# Patient Record
Sex: MALE | Race: WHITE | NOT HISPANIC OR LATINO
[De-identification: names, ages, dates, MRNs, and addresses within clinical notes are randomized per-mention and may not be internally consistent; named-entity substitution may affect disease eponyms.]

---

## 2019-02-14 ENCOUNTER — TRANSCRIPTION ENCOUNTER (OUTPATIENT)
Age: 49
End: 2019-02-14

## 2019-02-14 ENCOUNTER — INPATIENT (INPATIENT)
Facility: HOSPITAL | Age: 49
LOS: 11 days | DRG: 64 | End: 2019-02-26
Attending: INTERNAL MEDICINE | Admitting: PSYCHIATRY & NEUROLOGY
Payer: COMMERCIAL

## 2019-02-14 ENCOUNTER — INPATIENT (INPATIENT)
Facility: HOSPITAL | Age: 49
LOS: 0 days | Discharge: HOME | DRG: 64 | End: 2019-02-14
Attending: INTERNAL MEDICINE | Admitting: INTERNAL MEDICINE
Payer: COMMERCIAL

## 2019-02-14 VITALS
OXYGEN SATURATION: 95 % | SYSTOLIC BLOOD PRESSURE: 126 MMHG | HEART RATE: 104 BPM | DIASTOLIC BLOOD PRESSURE: 83 MMHG | RESPIRATION RATE: 19 BRPM

## 2019-02-14 VITALS
TEMPERATURE: 98 F | OXYGEN SATURATION: 96 % | RESPIRATION RATE: 60 BRPM | DIASTOLIC BLOOD PRESSURE: 94 MMHG | HEART RATE: 106 BPM | SYSTOLIC BLOOD PRESSURE: 162 MMHG

## 2019-02-14 VITALS
DIASTOLIC BLOOD PRESSURE: 90 MMHG | HEART RATE: 79 BPM | OXYGEN SATURATION: 97 % | SYSTOLIC BLOOD PRESSURE: 139 MMHG | RESPIRATION RATE: 20 BRPM | HEIGHT: 69 IN | TEMPERATURE: 97 F | WEIGHT: 199.96 LBS

## 2019-02-14 DIAGNOSIS — F10.20 ALCOHOL DEPENDENCE, UNCOMPLICATED: ICD-10-CM

## 2019-02-14 DIAGNOSIS — I61.9 NONTRAUMATIC INTRACEREBRAL HEMORRHAGE, UNSPECIFIED: ICD-10-CM

## 2019-02-14 DIAGNOSIS — S02.609A FRACTURE OF MANDIBLE, UNSPECIFIED, INITIAL ENCOUNTER FOR CLOSED FRACTURE: Chronic | ICD-10-CM

## 2019-02-14 DIAGNOSIS — K11.6 MUCOCELE OF SALIVARY GLAND: ICD-10-CM

## 2019-02-14 DIAGNOSIS — I10 ESSENTIAL (PRIMARY) HYPERTENSION: ICD-10-CM

## 2019-02-14 LAB
ALBUMIN SERPL ELPH-MCNC: 4.7 G/DL — SIGNIFICANT CHANGE UP (ref 3.3–5.2)
ALP SERPL-CCNC: 68 U/L — SIGNIFICANT CHANGE UP (ref 40–120)
ALT FLD-CCNC: 39 U/L — SIGNIFICANT CHANGE UP
ANION GAP SERPL CALC-SCNC: 12 MMOL/L — SIGNIFICANT CHANGE UP (ref 5–17)
ANION GAP SERPL CALC-SCNC: 18 MMOL/L — HIGH (ref 5–17)
APTT BLD: 26.4 SEC — LOW (ref 27.5–36.3)
AST SERPL-CCNC: 30 U/L — SIGNIFICANT CHANGE UP
BASOPHILS # BLD AUTO: 0 K/UL — SIGNIFICANT CHANGE UP (ref 0–0.2)
BASOPHILS NFR BLD AUTO: 0.2 % — SIGNIFICANT CHANGE UP (ref 0–2)
BILIRUB SERPL-MCNC: 0.4 MG/DL — SIGNIFICANT CHANGE UP (ref 0.4–2)
BLD GP AB SCN SERPL QL: NEGATIVE — SIGNIFICANT CHANGE UP
BUN SERPL-MCNC: 8 MG/DL — SIGNIFICANT CHANGE UP (ref 7–23)
BUN SERPL-MCNC: 8 MG/DL — SIGNIFICANT CHANGE UP (ref 8–20)
CALCIUM SERPL-MCNC: 9.1 MG/DL — SIGNIFICANT CHANGE UP (ref 8.4–10.5)
CALCIUM SERPL-MCNC: 9.2 MG/DL — SIGNIFICANT CHANGE UP (ref 8.6–10.2)
CHLORIDE SERPL-SCNC: 99 MMOL/L — SIGNIFICANT CHANGE UP (ref 96–108)
CHLORIDE SERPL-SCNC: 99 MMOL/L — SIGNIFICANT CHANGE UP (ref 98–107)
CO2 SERPL-SCNC: 22 MMOL/L — SIGNIFICANT CHANGE UP (ref 22–31)
CO2 SERPL-SCNC: 27 MMOL/L — SIGNIFICANT CHANGE UP (ref 22–29)
CREAT SERPL-MCNC: 0.6 MG/DL — SIGNIFICANT CHANGE UP (ref 0.5–1.3)
CREAT SERPL-MCNC: 0.71 MG/DL — SIGNIFICANT CHANGE UP (ref 0.5–1.3)
EOSINOPHIL # BLD AUTO: 0.2 K/UL — SIGNIFICANT CHANGE UP (ref 0–0.5)
EOSINOPHIL NFR BLD AUTO: 5.3 % — HIGH (ref 0–5)
GLUCOSE SERPL-MCNC: 147 MG/DL — HIGH (ref 70–99)
GLUCOSE SERPL-MCNC: 184 MG/DL — HIGH (ref 70–115)
HBA1C BLD-MCNC: 6.5 % — HIGH (ref 4–5.6)
HCT VFR BLD CALC: 42.7 % — SIGNIFICANT CHANGE UP (ref 42–52)
HGB BLD-MCNC: 14.8 G/DL — SIGNIFICANT CHANGE UP (ref 14–18)
INR BLD: 0.92 RATIO — SIGNIFICANT CHANGE UP (ref 0.88–1.16)
LYMPHOCYTES # BLD AUTO: 1.4 K/UL — SIGNIFICANT CHANGE UP (ref 1–4.8)
LYMPHOCYTES # BLD AUTO: 34.4 % — SIGNIFICANT CHANGE UP (ref 20–55)
MAGNESIUM SERPL-MCNC: 1.8 MG/DL — SIGNIFICANT CHANGE UP (ref 1.6–2.6)
MCHC RBC-ENTMCNC: 31.5 PG — HIGH (ref 27–31)
MCHC RBC-ENTMCNC: 34.7 G/DL — SIGNIFICANT CHANGE UP (ref 32–36)
MCV RBC AUTO: 90.9 FL — SIGNIFICANT CHANGE UP (ref 80–94)
MONOCYTES # BLD AUTO: 0.4 K/UL — SIGNIFICANT CHANGE UP (ref 0–0.8)
MONOCYTES NFR BLD AUTO: 8.7 % — SIGNIFICANT CHANGE UP (ref 3–10)
NEUTROPHILS # BLD AUTO: 2.1 K/UL — SIGNIFICANT CHANGE UP (ref 1.8–8)
NEUTROPHILS NFR BLD AUTO: 51.2 % — SIGNIFICANT CHANGE UP (ref 37–73)
PHOSPHATE SERPL-MCNC: 3.4 MG/DL — SIGNIFICANT CHANGE UP (ref 2.5–4.5)
PLATELET # BLD AUTO: 150 K/UL — SIGNIFICANT CHANGE UP (ref 150–400)
POTASSIUM SERPL-MCNC: 3.7 MMOL/L — SIGNIFICANT CHANGE UP (ref 3.5–5.3)
POTASSIUM SERPL-MCNC: 3.9 MMOL/L — SIGNIFICANT CHANGE UP (ref 3.5–5.3)
POTASSIUM SERPL-SCNC: 3.7 MMOL/L — SIGNIFICANT CHANGE UP (ref 3.5–5.3)
POTASSIUM SERPL-SCNC: 3.9 MMOL/L — SIGNIFICANT CHANGE UP (ref 3.5–5.3)
PROT SERPL-MCNC: 7.9 G/DL — SIGNIFICANT CHANGE UP (ref 6.6–8.7)
PROTHROM AB SERPL-ACNC: 10.6 SEC — SIGNIFICANT CHANGE UP (ref 10–12.9)
RBC # BLD: 4.7 M/UL — SIGNIFICANT CHANGE UP (ref 4.6–6.2)
RBC # FLD: 12.6 % — SIGNIFICANT CHANGE UP (ref 11–15.6)
RH IG SCN BLD-IMP: POSITIVE — SIGNIFICANT CHANGE UP
SODIUM SERPL-SCNC: 138 MMOL/L — SIGNIFICANT CHANGE UP (ref 135–145)
SODIUM SERPL-SCNC: 139 MMOL/L — SIGNIFICANT CHANGE UP (ref 135–145)
TROPONIN T SERPL-MCNC: <0.01 NG/ML — SIGNIFICANT CHANGE UP (ref 0–0.06)
WBC # BLD: 4.2 K/UL — LOW (ref 4.8–10.8)
WBC # FLD AUTO: 4.2 K/UL — LOW (ref 4.8–10.8)

## 2019-02-14 PROCEDURE — 70496 CT ANGIOGRAPHY HEAD: CPT

## 2019-02-14 PROCEDURE — 99291 CRITICAL CARE FIRST HOUR: CPT | Mod: 25

## 2019-02-14 PROCEDURE — 71045 X-RAY EXAM CHEST 1 VIEW: CPT | Mod: 26,77

## 2019-02-14 PROCEDURE — ZZZZZ: CPT

## 2019-02-14 PROCEDURE — 84484 ASSAY OF TROPONIN QUANT: CPT

## 2019-02-14 PROCEDURE — 93005 ELECTROCARDIOGRAM TRACING: CPT

## 2019-02-14 PROCEDURE — 70487 CT MAXILLOFACIAL W/DYE: CPT

## 2019-02-14 PROCEDURE — 36620 INSERTION CATHETER ARTERY: CPT

## 2019-02-14 PROCEDURE — 71045 X-RAY EXAM CHEST 1 VIEW: CPT | Mod: 26

## 2019-02-14 PROCEDURE — 80053 COMPREHEN METABOLIC PANEL: CPT

## 2019-02-14 PROCEDURE — 36415 COLL VENOUS BLD VENIPUNCTURE: CPT

## 2019-02-14 PROCEDURE — 99291 CRITICAL CARE FIRST HOUR: CPT

## 2019-02-14 PROCEDURE — 71045 X-RAY EXAM CHEST 1 VIEW: CPT

## 2019-02-14 PROCEDURE — 85027 COMPLETE CBC AUTOMATED: CPT

## 2019-02-14 PROCEDURE — 70496 CT ANGIOGRAPHY HEAD: CPT | Mod: 26

## 2019-02-14 PROCEDURE — 70450 CT HEAD/BRAIN W/O DYE: CPT | Mod: 26,59,77

## 2019-02-14 PROCEDURE — 70450 CT HEAD/BRAIN W/O DYE: CPT | Mod: 26

## 2019-02-14 PROCEDURE — 93010 ELECTROCARDIOGRAM REPORT: CPT

## 2019-02-14 PROCEDURE — 82962 GLUCOSE BLOOD TEST: CPT

## 2019-02-14 PROCEDURE — 70450 CT HEAD/BRAIN W/O DYE: CPT

## 2019-02-14 PROCEDURE — 85610 PROTHROMBIN TIME: CPT

## 2019-02-14 PROCEDURE — 70487 CT MAXILLOFACIAL W/DYE: CPT | Mod: 26

## 2019-02-14 PROCEDURE — 96374 THER/PROPH/DIAG INJ IV PUSH: CPT

## 2019-02-14 PROCEDURE — 85730 THROMBOPLASTIN TIME PARTIAL: CPT

## 2019-02-14 RX ORDER — LABETALOL HCL 100 MG
10 TABLET ORAL ONCE
Qty: 0 | Refills: 0 | Status: COMPLETED | OUTPATIENT
Start: 2019-02-14 | End: 2019-02-14

## 2019-02-14 RX ORDER — HYDRALAZINE HCL 50 MG
10 TABLET ORAL ONCE
Qty: 0 | Refills: 0 | Status: COMPLETED | OUTPATIENT
Start: 2019-02-14 | End: 2019-02-14

## 2019-02-14 RX ORDER — NICARDIPINE HYDROCHLORIDE 30 MG/1
5 CAPSULE, EXTENDED RELEASE ORAL
Qty: 40 | Refills: 0 | Status: DISCONTINUED | OUTPATIENT
Start: 2019-02-14 | End: 2019-02-14

## 2019-02-14 RX ORDER — POTASSIUM CHLORIDE 20 MEQ
40 PACKET (EA) ORAL ONCE
Qty: 0 | Refills: 0 | Status: COMPLETED | OUTPATIENT
Start: 2019-02-14 | End: 2019-02-15

## 2019-02-14 RX ORDER — POTASSIUM CHLORIDE 20 MEQ
40 PACKET (EA) ORAL ONCE
Qty: 0 | Refills: 0 | Status: DISCONTINUED | OUTPATIENT
Start: 2019-02-14 | End: 2019-02-14

## 2019-02-14 RX ORDER — SODIUM CHLORIDE 5 G/100ML
500 INJECTION, SOLUTION INTRAVENOUS
Qty: 0 | Refills: 0 | Status: DISCONTINUED | OUTPATIENT
Start: 2019-02-14 | End: 2019-02-14

## 2019-02-14 RX ORDER — SODIUM CHLORIDE 5 G/100ML
1000 INJECTION, SOLUTION INTRAVENOUS
Qty: 0 | Refills: 0 | Status: DISCONTINUED | OUTPATIENT
Start: 2019-02-14 | End: 2019-02-15

## 2019-02-14 RX ORDER — MAGNESIUM SULFATE 500 MG/ML
2 VIAL (ML) INJECTION ONCE
Qty: 0 | Refills: 0 | Status: COMPLETED | OUTPATIENT
Start: 2019-02-14 | End: 2019-02-15

## 2019-02-14 RX ORDER — LEVETIRACETAM 250 MG/1
1500 TABLET, FILM COATED ORAL EVERY 12 HOURS
Qty: 0 | Refills: 0 | Status: DISCONTINUED | OUTPATIENT
Start: 2019-02-14 | End: 2019-02-14

## 2019-02-14 RX ORDER — FOLIC ACID 0.8 MG
1 TABLET ORAL DAILY
Qty: 0 | Refills: 0 | Status: DISCONTINUED | OUTPATIENT
Start: 2019-02-14 | End: 2019-02-15

## 2019-02-14 RX ORDER — NICARDIPINE HYDROCHLORIDE 30 MG/1
5 CAPSULE, EXTENDED RELEASE ORAL
Qty: 40 | Refills: 0 | Status: DISCONTINUED | OUTPATIENT
Start: 2019-02-14 | End: 2019-02-16

## 2019-02-14 RX ORDER — THIAMINE MONONITRATE (VIT B1) 100 MG
100 TABLET ORAL DAILY
Qty: 0 | Refills: 0 | Status: DISCONTINUED | OUTPATIENT
Start: 2019-02-14 | End: 2019-02-15

## 2019-02-14 RX ORDER — INFLUENZA VIRUS VACCINE 15; 15; 15; 15 UG/.5ML; UG/.5ML; UG/.5ML; UG/.5ML
0.5 SUSPENSION INTRAMUSCULAR ONCE
Qty: 0 | Refills: 0 | Status: DISCONTINUED | OUTPATIENT
Start: 2019-02-14 | End: 2019-02-14

## 2019-02-14 RX ORDER — LABETALOL HCL 100 MG
10 TABLET ORAL
Qty: 0 | Refills: 0 | Status: DISCONTINUED | OUTPATIENT
Start: 2019-02-14 | End: 2019-02-14

## 2019-02-14 RX ORDER — METOPROLOL TARTRATE 50 MG
50 TABLET ORAL ONCE
Qty: 0 | Refills: 0 | Status: COMPLETED | OUTPATIENT
Start: 2019-02-14 | End: 2019-02-14

## 2019-02-14 RX ADMIN — NICARDIPINE HYDROCHLORIDE 25 MG/HR: 30 CAPSULE, EXTENDED RELEASE ORAL at 12:32

## 2019-02-14 RX ADMIN — Medication 10 MILLIGRAM(S): at 10:53

## 2019-02-14 RX ADMIN — SODIUM CHLORIDE 75 MILLILITER(S): 5 INJECTION, SOLUTION INTRAVENOUS at 18:56

## 2019-02-14 RX ADMIN — Medication 10 MILLIGRAM(S): at 10:23

## 2019-02-14 RX ADMIN — Medication 10 MILLIGRAM(S): at 12:43

## 2019-02-14 RX ADMIN — NICARDIPINE HYDROCHLORIDE 25 MG/HR: 30 CAPSULE, EXTENDED RELEASE ORAL at 12:44

## 2019-02-14 RX ADMIN — LEVETIRACETAM 400 MILLIGRAM(S): 250 TABLET, FILM COATED ORAL at 15:16

## 2019-02-14 RX ADMIN — Medication 1 MILLIGRAM(S): at 11:23

## 2019-02-14 RX ADMIN — SODIUM CHLORIDE 30 MILLILITER(S): 5 INJECTION, SOLUTION INTRAVENOUS at 15:22

## 2019-02-14 RX ADMIN — Medication 50 MILLIGRAM(S): at 11:38

## 2019-02-14 NOTE — ED PROVIDER NOTE - OBJECTIVE STATEMENT
Mr. Way is a 48 year old male with no significant past medical history and who hasn't seen a physician in many years, who presents with acute onset of weakness in bilateral arms and legs as well as change in speech. This morning, he woke up around 7am and felt fine. He dropped his son off at school and went to work. Between 9am-9:30am he experienced sudden weakness in both arms and legs and was unable to walk. He denies any trauma or fall. He sat down on the ground and his friend helped him get to the hospital. Currently, he no longer feels weak, but does state that his speech sounds slurred to him. He admits to being flustered by the number of people in the room. This has never happened to him before.    seen by intake doc - called on vocera advised stroke activation-  spoke with stroke EICU - not candidate for TPA given acute hemorrhage - NEURO sx cristina saw in the ED - ok for MICU admission/BP control q 1 hour neuro checks

## 2019-02-14 NOTE — DISCHARGE NOTE ADULT - CARE PROVIDER_API CALL
Bennett Kendrick)  Neurosurgery  [facility], [address] [place] [address]  Phone: [phone]  Fax: [phone]  Follow Up Time:

## 2019-02-14 NOTE — ED ADULT NURSE REASSESSMENT NOTE - NS ED NURSE REASSESS COMMENT FT1
Code stroke called at 9:53, pt at Cat scan at this time, Dr. Manjarrez and Asim team at bedside awaiting pt for assessment.

## 2019-02-14 NOTE — H&P ADULT - MOTOR
minor pronator drift on L arm minor pronator drift on Right  arm  Strength 4/5 on Right Arm 5/5 on Right Leg

## 2019-02-14 NOTE — H&P ADULT - ATTENDING COMMENTS
Pt admitted by [facility] PA, I have seen and evaluated this patient independently and agree with the above findings: 48 year old male with h/o parotid cyst presents to ER with right sided weakness found to be hypertensive with L Basal ganglia bleed. Neurosurgery consulted.

## 2019-02-14 NOTE — ED ADULT NURSE NOTE - PMH
ICH (intracerebral hemorrhage) Cyst of parotid gland  Left x 15 years  ICH (intracerebral hemorrhage)    Jaw fracture

## 2019-02-14 NOTE — H&P ADULT - NSHPPHYSICALEXAM_GEN_ALL_CORE
Awake, eyes open spontaneously, global aphasia, mumbles some words, not following commands, moves left side with 5/5 strength spontaneously, RUE 0/5, RLE 3/5, pupils 3mm and brisk bilaterally, extraocular movements intact

## 2019-02-14 NOTE — PROGRESS NOTE ADULT - SUBJECTIVE AND OBJECTIVE BOX
HPI:  Mr. Way is a 47 y/o male with no known history of HTN (not followed closely by PCP) who presents after an acute onset of BLE weakness this morning at work and speech changes. The patient is dysarthric with some expressive aphasia on exam but reliably denies waking up with any focalities and instead had acute onset while attempting to get in the car while at work this morning. Denies any known history of BP or DVT/PE or recent headaches or vision changes. Denies any recent falls. Is not on any antiplatelet or anticoagulation therapy.  HCT showed acute left 2.1 x 1.8 x 2.8 cm cerebral hematoma centered in the left lentiform nucleus, with trace surrounding vasogenic edema. Mild associated mass effect. No midline shift. Patient was borderline hypertensive with 140s-150s mmHg systolic on presentation.    On admission, the patient was:  GCS: 15.  ICH score: 1  NIHSS: 5    VITALS:  T(C): , Max: 36.9 (02-14-19 @ 12:30)  HR:  (77 - 106)  BP:  (126/83 - 178/109)  ABP: --  RR:  (15 - 60)  SpO2:  (93% - 100%)  Wt(kg): --      LABS:  Na: 138 (02-14 @ 10:09)  K: 3.9 (02-14 @ 10:09)  Cl: 99 (02-14 @ 10:09)  CO2: 27.0 (02-14 @ 10:09)  BUN: 8.0 (02-14 @ 10:09)  Cr: 0.71 (02-14 @ 10:09)  Glu: 184(02-14 @ 10:09)    Hgb: 14.8 (02-14 @ 10:09)  Hct: 42.7 (02-14 @ 10:09)  WBC: 4.2 (02-14 @ 10:09)  Plt: 150 (02-14 @ 10:09)  PT: 10.6 (02-14 @ 10:09)  INR: 0.92 (02-14 @ 10:09)  aPTT: 26.4 (02-14 @ 10:09)    IMAGING:   Recent imaging studies were reviewed.    MEDICATIONS:    EXAMINATION:  General:  calm  HEENT:  MMM  Neuro:  awake, alert, oriented x 3, follows commands, moves all extremities  Cards:  RRR  Respiratory:  no respiratory distress  [place]domen:  soft  Extremities:  no edema  Skin:  warm/dry

## 2019-02-14 NOTE — ED STATDOCS - PROGRESS NOTE DETAILS
49 y/o M pt with no pert. hx presents to ED c/o weakness, and heaviness for approx. 1 hour. Pt notes slight dizziness and slight slurring speech. Pt reports he had difficulty ambulating on his own, however now in ED pt is ambulating normally. Denies nausea, and vomiting. No further complaints at this time.

## 2019-02-14 NOTE — PROGRESS NOTE ADULT - PROBLEM SELECTOR PLAN 1
cause unknown cause unknown  pt with worsening symptoms and increasing blood on CT   plan to now transfer to [facility] neuroICU urgently for higher level of care, neuroimaging and possible neuro intervention

## 2019-02-14 NOTE — H&P ADULT - RS GEN PE MLT RESP DETAILS PC
breath sounds equal/no wheezes/respirations non-labored/clear to auscultation bilaterally/good air movement/no rales/no rhonchi

## 2019-02-14 NOTE — ED ADULT NURSE NOTE - CHIEF COMPLAINT QUOTE
Patient arrived to ED today with c/o weakness, near syncope, weakness to his legs.  Patient reports he did not eat yet today.  Patient denies medical history.  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  Patient states he is feeling better at this time and is feeling less weak.  .

## 2019-02-14 NOTE — PROGRESS NOTE ADULT - ASSESSMENT
47 y/o male who presented s/p b/l arm and leg weakness found on CT to have a 2x2 hemorrhage in his basal ganglia, with no mass effect or significant surrounding edema. On initial physical exam performed in ED it was reported that he had b/l leg and arm weakness, dysarthria, expressive aphagia, hemianopsia. Course could be complicated by the fact that pt consumes 8-10 beers/day and may experience withdrawal symptoms that could skew neuro exam. On repeat exam pt had improvement in neurological findings but due to need for q1 neurological checks and potential for hemorrhage to worsen the pt warrants ICU admission.

## 2019-02-14 NOTE — H&P ADULT - PROBLEM SELECTOR PLAN 2
Possibly related to jaw surgery  U/S for further workup Possibly related to jaw injury severeal years ago.  U/S for further workup

## 2019-02-14 NOTE — PATIENT PROFILE ADULT - NSPROEDALEARNPREF_GEN_A_NUR
computer/internet/written material/audio/pictorial/skill demonstration/verbal instruction/individual instruction/group instruction

## 2019-02-14 NOTE — H&P ADULT - PROBLEM SELECTOR PLAN 1
Cause unknown   Strict blood pressure parameters as recommended by neurosurgery (SBP <160 mmHg)   q1 neuro checks for 24 hrs  Repeat CT in 6 hours from presentation (3:30pm) consider MRI  Neuro surgery is continuing to follow Likely HTN in nature, however still need to exclude any underlying vascular anomaly or mass.   Strict blood pressure control with IV Cardene keeping SBP <140  Q1 neuro checks for 24 hrs  Repeat CT in 6 hours from presentation (3:30pm) consider MRI  Neuro surgery is continuing to follow

## 2019-02-14 NOTE — ED ADULT NURSE NOTE - OBJECTIVE STATEMENT
received pt in critical care with code team and Dr. Manjarrez, code stroke called 9:53, no TPA given due to ICH. will cont to monitor blood pressure and neurological status

## 2019-02-14 NOTE — H&P ADULT - NSHPLABSRESULTS_GEN_ALL_CORE
< from: CT Angio Head w/ IV Cont (02.14.19 @ 15:49) >    CT HEAD:    Since head CT performed earlier on the same day, 2/14/19 at 9:56 AM,   there is interval increase in size of acute left cerebral parenchymal   hematoma centered within the left lentiform nucleus now measuring 3.8 x   4.0 x 5.0 cm (in greatest TR by AP by CC dimensions), prior measuring 2.1   x 1.8 x 2.8 cm. There is increase in mild/moderate mass effect with now a   slitlike left lateral ventricle and new 0.4 cm rightward midline shift.   In addition, there is new minimal left uncal herniation (coronal image 27  series 8). There is trace surrounding vasogenic edema. No new areas of   hemorrhage. There is evidence for active bleeding within the hematoma on   postcontrast maxillofacial CT performed earlier on the same day (axial   image 196 series 3).    There is no loss of the gray-white matter distinction.    There is no hydrocephalus.    No extra-axial collection.       The visualized orbits are unremarkable.    The calvarium is intact.    The visualized paranasal sinuses and mastoid air cells are clear.    Left parotid mass lesion is again noted as described on prior imaging.    HEAD CTA:    The internal carotid arteries demonstrate normal enhancement to the   intracranial circulation and [place]. Anterior and middle   cerebral arteries demonstrate normal enhancement and symmetric   arborization without intraluminal filling defect, stenosis, occlusion,   aneurysm or vascular malformation.    The intradural vertebral arteries demonstrate normal enhancement to the   vertebrobasilar confluence. There is fetal origin of the right posterior   cerebral artery with hypoplastic P1 segment, a normal variant. The   remaining branch vasculature of the posterior circulation are within   normal limits. The posterior cerebral arteries demonstrate symmetric   enhancement and arborization without evidence for aneurysm, stenosis,   occlusion or vascular malformation.    The anterior and posterior communicating arteries are visualized and   normal.    Visualized dural venous sinuses and deep cerebral venous structures   demonstrate normal enhancement without evidence for filling defect.    There is findings suggesting active bleeding within the left lentiform   hematoma (image 175 series 6).    < end of copied text >

## 2019-02-14 NOTE — PROGRESS NOTE ADULT - ASSESSMENT
ASSESSMENT/PLAN:  Left BG ICH, expanded on repeat CT head, CTA no vascular malformation   has cytotoxic edema   most likley HTN  ICH score 1    Repeat head CT at 9 pm is stable, neuro checks q 1 hr, NPO after midnight for angiogram   Alcoholic - drinks 12 beers/day for several years, CIWA protocol, Ativan PO taper   Stroke core measures, Stroke consult   2% 75 ml/hr for cerebral edema  SBP gaol 100-140 mmhg, NICARDIPINE , will get an a line   NPO for angio     VTE prophylaxis: [x] SCDs   [] high risk of DVT/PE on admission due to:    CODE STATUS:  [x] Full Code [] DNR [] DNI [] Palliative/Comfort Care    DISPOSITION:  [X] ICU [] Stroke Unit [] Floor [] EMU [] RCU [] PCU    Time spent: 35 [x] critical care minutes    Contact: [phone]

## 2019-02-14 NOTE — PROGRESS NOTE ADULT - SUBJECTIVE AND OBJECTIVE BOX
HPI:    SURGERY:       EVENTS:         ICU Vital Signs Last 24 Hrs  T(C): 36.9 (14 Feb 2019 16:45), Max: 36.9 (14 Feb 2019 12:30)  T(F): 98.5 (14 Feb 2019 16:45), Max: 98.5 (14 Feb 2019 16:45)  HR: 94 (14 Feb 2019 17:15) (77 - 106)  BP: 156/96 (14 Feb 2019 17:15) (126/83 - 178/109)  BP(mean): 120 (14 Feb 2019 17:15) (100 - 135)  ABP: --  ABP(mean): --  RR: 24 (14 Feb 2019 17:15) (15 - 60)  SpO2: 96% (14 Feb 2019 17:15) (93% - 100%)                            14.8   4.2   )-----------( 150      ( 14 Feb 2019 10:09 )             42.7    02-14    138  |  99  |  8.0  ----------------------------<  184<H>  3.9   |  27.0  |  0.71    Ca    9.2      14 Feb 2019 10:09    TPro  7.9  /  Alb  4.7  /  TBili  0.4  /  DBili  x   /  AST  30  /  ALT  39  /  AlkPhos  68  02-14            PHYSICAL EXAM:    General: No Acute Distress     Neurological: Awake, alert oriented to person, place and time, Following Commands, PERRL, EOMI, Face Symmetrical, Speech Fluent, Moving all extremities, Muscle Strength normal in all four extremities, No Drift, Sensation to Light Touch Intact    Pulmonary: Clear to Auscultation, No Rales, No Rhonchi, No Wheezes     Cardiovascular: S1, S2, Regular Rate and Rhythm     Gastrointestinal: Soft, Nontender, Nondistended     Extremities: No calf tenderness     Incision:       MEDICATIONS:  Antibiotics:      Neurological:     Cardiac:     Pulm:    Heme:     Other:        DEVICES: [] Restraints [] LIZBETH/HMV []LD [] ET tube [] Trach [] Chest Tube [] A-line [] Sterling [] NGT [] Rectal Tube       A/P:  Left BG ICH, expanded on repeat CT head, CTA no vascular malformation   has cytotoxic edema   most likley HTN    Neuro: repeat head CT at 9 pm, neuro checks q 1 hr, NPO after midnight for angiogram per Dr Olvera   Respiratory: RA  CV: SBP gaol 100-140 mmhg, NICARDIPINE   Endocrine: finger sticks q 6 hrs, IS  Heme/Onc:   coags, INR, PTT          DVT ppx:  SCD  Renal: NS 75 ml/hr   ID: afebrile   GI: NPO for angio tomorrow and tsability CT scan   Social/Family: ICU  Discharge planning: ICU      Code Status: [x] Full Code [] DNR [] DNI [] Goals of Care:   Disposition: [x] ICU [] Stroke Unit [] RCU []PCU []Floor [] Discharge Home     Patient at high risk for neurologic deterioration, critical care time, excluding procedures: 35 minutes HPI:  48 year old man unknown PMH transferred from outside hospital for left ICH. He was admitted to the ICU and had worsening weakness, repeat CT head showed increase in ICH. He drinks 6 beers a day, not in the morning  doesn't take any medications at home        ICU Vital Signs Last 24 Hrs  T(C): 36.9 (14 Feb 2019 16:45), Max: 36.9 (14 Feb 2019 12:30)  T(F): 98.5 (14 Feb 2019 16:45), Max: 98.5 (14 Feb 2019 16:45)  HR: 94 (14 Feb 2019 17:15) (77 - 106)  BP: 156/96 (14 Feb 2019 17:15) (126/83 - 178/109)  BP(mean): 120 (14 Feb 2019 17:15) (100 - 135)  ABP: --  ABP(mean): --  RR: 24 (14 Feb 2019 17:15) (15 - 60)  SpO2: 96% (14 Feb 2019 17:15) (93% - 100%)                            14.8   4.2   )-----------( 150      ( 14 Feb 2019 10:09 )             42.7    02-14    138  |  99  |  8.0  ----------------------------<  184<H>  3.9   |  27.0  |  0.71    Ca    9.2      14 Feb 2019 10:09    TPro  7.9  /  Alb  4.7  /  TBili  0.4  /  DBili  x   /  AST  30  /  ALT  39  /  AlkPhos  68  02-14            PHYSICAL EXAM:    General: No Acute Distress     Neurological: Awake, expressive and receptive aphasia, not Following Commands, PERRL, EOMI, right facial, 5/5 on the left side, 0/5 in the right UE, 3/5 in the right LE     Pulmonary: Clear to Auscultation, No Rales, No Rhonchi, No Wheezes     Cardiovascular: S1, S2, Regular Rate and Rhythm     Gastrointestinal: Soft, Nontender, Nondistended     Extremities: No calf tenderness     Incision:       MEDICATIONS:  Antibiotics:      Neurological:     Cardiac:     Pulm:    Heme:     Other:        DEVICES: [] Restraints [] LIZBETH/HMV []LD [] ET tube [] Trach [] Chest Tube [] A-line [] Sterling [] NGT [] Rectal Tube       A/P:  Left BG ICH, expanded on repeat CT head, CTA no vascular malformation   has cytotoxic edema   most likley HTN  ICH score 1    Neuro: repeat head CT at 9 pm, neuro checks q 1 hr, NPO after midnight for angiogram per Dr Wade gomes, [facility] protocol  thiamine   2% 75 ml/hr for cerebral edema, will get ABG now  Respiratory: RA  CV: SBP gaol 100-140 mmhg, NICARDIPINE , will get an a line   Endocrine: finger sticks q 6 hrs, IS  Heme/Onc:   coags, INR, PTT          DVT ppx:  SCD  Renal: 2% 75 ml/hr, BMP q 6 hrs , sodium goal 140-145   ID: afebrile   GI: NPO for angio tomorrow and sability CT scan   Social/Family: ICU  Discharge planning: ICU      Code Status: [x] Full Code [] DNR [] DNI [] Goals of Care:   Disposition: [x] ICU [] Stroke Unit [] RCU []PCU []Floor [] Discharge Home     Patient at high risk for neurologic deterioration, critical care time, excluding procedures: 35 minutes

## 2019-02-14 NOTE — H&P ADULT - NEGATIVE OPHTHALMOLOGIC SYMPTOMS
no discharge L/no blurred vision L/no lacrimation R/no photophobia/no lacrimation L/no discharge R/no blurred vision R/no diplopia

## 2019-02-14 NOTE — H&P ADULT - HISTORY OF PRESENT ILLNESS
Patient is a 48 year old male with a past medical history of ETOH abuse (drinks 5 beers/day) who presented to [facility] after an episode of sudden onset b/l leg and arm weakness. Pt reports that he was helping a coworker replace a window when he had an acute onset of generalized b/l leg and arm weakness. He ambulated to the car with difficulty and had his coworker take him to [facility] ED. Pt reported when he tried to make a phone call his arms were so weak that he had difficulty keeping the phone at his ear. He also stated that when he arrived to the ED he had to be wheelchaired due to the weakness in his legs. He reports that at this time he feels like he is regaining strength in his legs and arms.     CT head performed at [facility] revealed an acute left basal ganglia ICH with cerebral edema, brain compression and mass effect. He was admitted to the ICU at [facility] where he had worsening mental status with worsening right sided hemiparesis. Repeat CT head was performed and revealed worsening basal ganglia hemorrhage, cerebral edema, and midline shift. He was placed on a Nicardipine infusion for strict blood pressure control and transferred to [facility] for further neurosurgical intervention and possible cerebral angiogram.     ICH Score 1 on admission

## 2019-02-14 NOTE — CONSULT NOTE ADULT - ASSESSMENT
49 y/o male with  no known medical history. Presents after acute onset of BLE weakness (denies neck of back pain associated).   CTH demonstrates acute L lentiform nucleus IPH w/o MLS or mass effect.   NIHSS: 5 (visual field, LUE, limb ataxia, language, dysarthria).   BP was 140s-150smmHg systolic on admission.    PLAN:  - Recommend strict BP control with SBP <160mmHg  - HOB30  - Recommend admit to MICU for q1h neurochecks for 24 hours  - Repeat HCT in 6 hours from original imaging - recommend CTA H/N at that time to r/o vascular abnormality  - Hold chemical DVT ppx until further work up is complete  - Recommend work up of parotid mass in the case patient has underlying coagulopathy secondary to malignancy  - Recommend utox to r/o volitional etiology  - Will discuss with Dr. Kendrick

## 2019-02-14 NOTE — H&P ADULT - PMH
Cyst of parotid gland  Left x 15 years  ETOH abuse    ICH (intracerebral hemorrhage)    Jaw fracture

## 2019-02-14 NOTE — CHART NOTE - NSCHARTNOTEFT_GEN_A_CORE
Critical Care   Shortly after arrival in the MICU, the patients symptoms progressed substantially with Altered Mental Status as well as severe Right sided Hemiparesis.  Urgent CT Angio of the Head Showed there was increasing size of the bleed, with an active "blush". THere was increasing edema with midline shifting. There was no visible vascular anomaly seen.     These findings were discussed with Dr Kendrick, with the following decisions made regarding management.   1) Begin IV Keppra for Seizure prophylaxis  2) Begin IV 3% Sodium Chloride, to assist with Cerebral Edema (Goal will be Na of 145-150 and Osm of 310-315)  3) Transfer to [facility] Neuosurgical ICU, for continued management as well as possible Cerebral Angiogram      I have discussed these points with the patient's mother, who is in agreement with the transfer to [facility].    Discussed with the Transfer Center  Dr Guardado in the Neuro ICU at [facility] will  be accepting the patient.      An additional 30mins of time was spent in care of this critically ill patient and coordinating the pateint's care, including the transfer to [facility].

## 2019-02-14 NOTE — PROGRESS NOTE ADULT - SUBJECTIVE AND OBJECTIVE BOX
Patient is a 48y old  Male who presents with a chief complaint of Basal ganglia hemorrhage (14 Feb 2019 14:21)      BRIEF HOSPITAL COURSE: 47 y/o male with no pertinent PMHx presents after an episode of sudden onset b/l leg and arm weakness. Pt reports that he was helping a coworker replace a window when he had an acute onset of generalized b/l leg and arm weakness. He ambulated to the car with difficulty and had his coworker take him to [facility] ED. Pt reported when he tried to make a phone call his arms were so weak that he had difficulty keeping the phone at his ear. He also stated that when he arrived to the ED he had to be wheelchaired d/t the weakness in his legs. He reports that at this time he feels like he is regaining strength in his legs and arms.   Denies falling, HA, vomiting, LOC, visual changes, dysarthria, loss of sensation, incontinence     Events last 24 hours: upon arrival to MICU     PAST MEDICAL & SURGICAL HISTORY:  Jaw fracture  Cyst of parotid gland: Left x 15 years  ICH (intracerebral hemorrhage)  Fracture, jaw: with surgical repair        Medications:    labetalol Injectable 10 milliGRAM(s) IV Push every 2 hours PRN  niCARdipine Infusion 5 mG/Hr IV Continuous <Continuous>                    influenza   Vaccine 0.5 milliLiter(s) IntraMuscular once              ICU Vital Signs Last 24 Hrs  T(C): 36.9 (14 Feb 2019 12:30), Max: 36.9 (14 Feb 2019 12:30)  T(F): 98.4 (14 Feb 2019 12:30), Max: 98.4 (14 Feb 2019 12:30)  HR: 92 (14 Feb 2019 14:15) (77 - 100)  BP: 148/89 (14 Feb 2019 14:15) (131/87 - 178/109)  BP(mean): 113 (14 Feb 2019 14:15) (106 - 135)  ABP: --  ABP(mean): --  RR: 21 (14 Feb 2019 14:15) (15 - 33)  SpO2: 95% (14 Feb 2019 14:15) (94% - 100%)          I&O's Detail        LABS:                        14.8   4.2   )-----------( 150      ( 14 Feb 2019 10:09 )             42.7     02-14    138  |  99  |  8.0  ----------------------------<  184<H>  3.9   |  27.0  |  0.71    Ca    9.2      14 Feb 2019 10:09    TPro  7.9  /  Alb  4.7  /  TBili  0.4  /  DBili  x   /  AST  30  /  ALT  39  /  AlkPhos  68  02-14      CARDIAC MARKERS ( 14 Feb 2019 10:09 )  x     / <0.01 ng/mL / x     / x     / x          CAPILLARY BLOOD GLUCOSE      POCT Blood Glucose.: 178 mg/dL (14 Feb 2019 10:03)    PT/INR - ( 14 Feb 2019 10:09 )   PT: 10.6 sec;   INR: 0.92 ratio         PTT - ( 14 Feb 2019 10:09 )  PTT:26.4 sec    CULTURES:      Physical Examination:    General: No acute distress.      HEENT: Pupils equal, reactive to light.  Symmetric.    PULM: Clear to auscultation bilaterally, no significant sputum production    NECK: Supple, no lymphadenopathy, trachea midline    CVS: Regular rate and rhythm, no murmurs, rubs, or gallops    GI: Soft, nondistended, nontender, normoactive bowel sounds, no masses    EXT: No edema, nontender    SKIN: Warm and well perfused, no rashes noted.    NEURO: disoriented, right side neglect, dense right hemiplegia, profound aphasia     DEVICES:     RADIOLOGY:   < from: CT Maxillofacial w/ IV Cont (02.14.19 @ 12:13) >  ORBITAL CONTENTS:  Normal.    VISUALIZED INTRACRANIAL STRUCTURES: There is redemonstration of partially   imaged acute left lentiform nucleus hematoma, which appears increased in   size since head CT performed earlier on the same day, 2/14/2019 at 9:56   AM given differences in technique, now measuring approximate2.7 x 1.9  x   3.0 cm (TR by AP by CC), prior measuring 2.1 x 1.8 x 2.8 cm. In addition,   there is contrast blush within the hematoma suggesting active bleeding.   There appears to be a partially imaged new 0.3 cm rightward midline shift.      IMPRESSION:    Redemonstration of partially imaged acute left lentiform nucleus hematoma   which demonstrates active bleeding and appears slightly increased in size   since head CT performed earlier on the same day, 2/14/19 at 9:56 AM, now   measuring 2.7 x 1.9 x 3.0 cm, prior measuring 2.1 x 1.8 x 2.8 cm. In   addition there is a partially imaged new 0.3 cm rightward midline shift.    Redemonstration of a nonspecific left superficial 2.8 x 4.0 x 5.0 cm   parotid mass. NONEMERGENT ultrasound and if clinically indicated soft   tissue biopsy is recommended for further characterization.    Results discussed with Dr.MELISSA CHAPINCITO RIVERS, by radiologist Dr. MONROE DUARTE   at 1:30 PM on February 14, 2019.   Results discussed with Dr.BRUCE AN GAN, by radiologist Dr. MONROE DUARTE   at 1:35 PM on February 14, 2019.                 MONROE DUARTE M.D., ATTENDING RADIOLOGIST  This document has been electronically signed. Feb 14 2019  1:53PM        < end of copied text >  < from: CT Brain Stroke Protocol (02.14.19 @ 10:00) >    IMPRESSION:    Acute left 2.1 x 1.8 x 2.8 cm cerebral hematoma centeredin the left   lentiform nucleus, with trace surrounding vasogenic edema. Mild   associated mass effect. No midline shift. No herniation. Recommend brain   MRI with and without IV contrast to exclude underlying lesion.    Partially imaged superficial left parotid lobe 2.8 x 3.4 cm   multilobulated mass. Recommend ultrasound for further characterization.    Results discussed with Dr. HUTCHINSON, by radiologist  at 10:00 AM on   February 14, 2019.                   MONROE DUARTE M.D., ATTENDING RADIOLOGIST  This document has been electronically signed. Feb 14 2019 10:12AM    < end of copied text >    CRITICAL CARE TIME SPENT: 45

## 2019-02-14 NOTE — CHART NOTE - NSCHARTNOTEFT_GEN_A_CORE
CAPRINI SCORE [CLOT] Score on Admission for     AGE RELATED RISK FACTORS                                                       MOBILITY RELATED FACTORS  [x] Age 41-60 years                                            (1 Point)                  [x] Bed rest                                                        (1 Point)  [ ] Age: 61-74 years                                           (2 Points)                 [ ] Plaster cast                                                   (2 Points)  [ ] Age= 75 years                                              (3 Points)                 [ ] Bed bound for more than 72 hours                 (2 Points)    DISEASE RELATED RISK FACTORS                                               GENDER SPECIFIC FACTORS  [ ] Edema in the lower extremities                       (1 Point)                  [ ] Pregnancy                                                     (1 Point)  [ ] Varicose veins                                               (1 Point)                  [ ] Post-partum < 6 weeks                                   (1 Point)             [ ] BMI > 25 Kg/m2                                            (1 Point)                  [ ] Hormonal therapy  or oral contraception          (1 Point)                 [ ] Sepsis (in the previous month)                        (1 Point)                  [ ] History of pregnancy complications                 (1 point)  [ ] Pneumonia or serious lung disease                                               [ ] Unexplained or recurrent                     (1 Point)           (in the previous month)                               (1 Point)  [ ] Abnormal pulmonary function test                     (1 Point)                 SURGERY RELATED RISK FACTORS (include planned surgeries)  [ ] Acute myocardial infarction                              (1 Point)                 [ ]  Section                                             (1 Point)  [ ] Congestive heart failure (in the previous month)  (1 Point)         [ ] Minor surgery                                                  (1 Point)   [ ] Inflammatory bowel disease                             (1 Point)                 [ ] Arthroscopic surgery                                        (2 Points)  [ ] Central venous access                                      (2 Points)                [ ] General surgery lasting more than 45 minutes   (2 Points)       [x] Stroke (in the previous month)                          (5 Points)               [ ] Elective arthroplasty                                         (5 Points)            [ ] current or past malignancy                              (2 Points)                                                                                                       HEMATOLOGY RELATED FACTORS                                                 TRAUMA RELATED RISK FACTORS  [ ] Prior episodes of VTE                                     (3 Points)                [ ] Fracture of the hip, pelvis, or leg                       (5 Points)  [ ] Positive family history for VTE                         (3 Points)                 [ ] Acute spinal cord injury (in the previous month)  (5 Points)  [ ] Prothrombin 20795 A                                     (3 Points)                 [ ] Paralysis  (less than 1 month)                             (5 Points)  [ ] Factor V Leiden                                             (3 Points)                  [ ] Multiple Trauma within 1 month                        (5 Points)  [ ] Lupus anticoagulants                                     (3 Points)                                                           [ ] Anticardiolipin antibodies                               (3 Points)                                                       [ ] High homocysteine in the blood                      (3 Points)                                             [ ] Other congenital or acquired thrombophilia      (3 Points)                                                [ ] Heparin induced thrombocytopenia                  (3 Points)                                          Total Score [  7  ]    Risk:  Very low 0   Low 1 to 2   Moderate 3 to 4   High =5       VTE Prophylasix Recommednations:  [ x] mechanical pneumatic compression devices                                      [ ] contraindicated: _____________________  [ ] chemo prophylasix                                                                                   [ x] contraindicated ______new heme, will start if CT head stable_______________    **** HIGH LIKELIHOOD DVT PRESENT ON ADMISSION  [ ] (please order LE dopplers within 24 hours of admission)

## 2019-02-14 NOTE — ED PROVIDER NOTE - CRITICAL CARE PROVIDED
additional history taking/direct patient care (not related to procedure)/documentation/consultation with other physicians

## 2019-02-14 NOTE — PROGRESS NOTE ADULT - SUBJECTIVE AND OBJECTIVE BOX
incomplete SUMMARY:  48 year old man unknown PMH transferred from outside hospital for left ICH. He was admitted to the ICU and had worsening weakness, repeat CT head showed increase in ICH. He drinks 6 beers a day, not in the morning  Does not take any medications at home      Daytime Events:       VITAL SIGNS:  Reviewed   IMAGING: Reviewed  MEDICATIONS:  Reviewed     Physical Exam:  EXAMINATION:  General: No acute distress  HEENT: Anicteric sclerae  Cardiac: [id number]  Lungs: Clear  Abdomen: Soft, non-tender, +BS  Extremities: No c/c/e  Skin/Incision Site: Clean, dry and intact  NEURO: Awake, eyes open spontaneously, global aphasia, mumbles some words, not following commands, moves left side with 5/5 strength spontaneously, RUE 0/5, RLE 3/5, pupils 3mm and brisk bilaterally, extraocular movements intact SUMMARY:  48 year old man unknown PMH transferred from outside hospital for left ICH. He was admitted to the ICU and had worsening weakness, repeat CT head showed increase in ICH. He drinks 6 beers a day, not in the morning  Does not take any medications at home      VITAL SIGNS:  Reviewed   IMAGING: Reviewed  MEDICATIONS:  Reviewed     Physical Exam:  EXAMINATION:  General: No acute distress  HEENT: Anicteric sclerae  Cardiac: [id number]  Lungs: Clear  Abdomen: Soft, non-tender, +BS  Extremities: No c/c/e  Skin/Incision Site: Clean, dry and intact  NEURO: Awake, eyes open spontaneously, global aphasia, mumbles some words, not following commands, moves left side with 5/5 strength spontaneously, RUE 0/5, RLE 3/5, pupils 3mm and brisk bilaterally, extraocular movements intact

## 2019-02-14 NOTE — CONSULT NOTE ADULT - ATTENDING COMMENTS
NSGY Attg:    see above    patient seen and examined    Exam:  alert  conversant with dysarthria  R facial  LUE and LLE 5/5   RHP    CT head -- left basal ganglia ICH    agree with plan as above

## 2019-02-14 NOTE — CONSULT NOTE ADULT - SUBJECTIVE AND OBJECTIVE BOX
HISTORY OF PRESENT ILLNESS:   Mr. Way is a 47 y/o male with no known history of HTN (not followed closely by PCP) who presents after an acute onset of BLE weakness this morning at work and speech changes. The patient is dysarthric with some expressive aphasia on exam but reliably denies waking up with any focalities and instead had acute onset while attempting to get in the car while at work this morning. Denies any known history of BP or DVT/PE or recent headaches or vision changes. Denies any recent falls. Is not on any antiplatelet or anticoagulation therapy.  HCT showed acute left 2.1 x 1.8 x 2.8 cm cerebral hematoma centered in the left lentiform nucleus, with trace surrounding vasogenic edema. Mild associated mass effect. No midline shift. Patient was borderline hypertensive with 140s-150s mmHg systolic on presentation.     PAST MEDICAL & SURGICAL HISTORY:  Broke his jaw as a kid, had some form of parotid cyst drainage approx. 10 years ago  Denies PMH but does not follow closely with PCP    FAMILY HISTORY:  Denies    SOCIAL HISTORY:  Quit tobacco approx. 2 years ago  Social alcohol  Works in construction  Independent w ADLs    Allergies  No Known Allergies    REVIEW OF SYSTEMS  12 pt ROS otherwise unremarkable as per HPI    MEDICATIONS:  Denies    Vital Signs Last 24 Hrs  T(C): 36.3 (14 Feb 2019 09:24), Max: 36.3 (14 Feb 2019 09:24)  T(F): 97.3 (14 Feb 2019 09:24), Max: 97.3 (14 Feb 2019 09:24)  HR: 86 (14 Feb 2019 10:16) (79 - 87)  BP: 142/90 (14 Feb 2019 10:16) (139/90 - 152/93)  RR: 19 (14 Feb 2019 10:16) (18 - 20)  SpO2: 99% (14 Feb 2019 10:16) (97% - 99%)  PHYSICAL EXAM:  GENERAL: NAD, well-groomed, well-developed  HEAD:  Atraumatic, normocephalic, +palpable large L parotid mass  EYES: Conjunctiva and sclera clear  ENMT: moist mucous membranes, good dentition, no lesions  NECK: Supple, no JVD  NIHSS:   0 - alert  0 - month/age  0 - open/closes eyes/hand correctly  0 - normal gaze  1 - partial hemianopia (RUQ)  0 - no facial palsy  1/0 - mild LUE drift  0/0 - No RUE/RLE drift  1 - RUE limb ataxia  0 - no sensory loss  1 - mild to moderate aphasia - paraphasic errors  1 - mild to moderate dysarthria  0 - no extinction  NIHSS: 5 (visual field, LUE, limb ataxia, language, dysarthria)  CHEST/LUNG: Clear to auscultation bilaterally  HEART: +S1/+S2; Regular rate and rhythm  ABDOMEN: Soft, nontender, nondistended  EXTREMITIES:  2+ peripheral pulses, no clubbing, cyanosis, or edema  SKIN: Warm, dry; no rashes or lesions    LABS:             14.8   4.2   )-----------( 150                  42.7       RADIOLOGY & ADDITIONAL STUDIES:  CT Brain Stroke Protocol (02.14.19 @ 10:00)  IMPRESSION:  Acute left 2.1 x 1.8 x 2.8 cm cerebral hematoma centered in the left   lentiform nucleus, with trace surrounding vasogenic edema. Mild   associated mass effect. No midline shift. No herniation. Recommend brain   MRI with and without IV contrast to exclude underlying lesion.  Partially imaged superficial left parotid lobe 2.8 x 3.4 cm   multilobulated mass. Recommend ultrasound for further characterization.

## 2019-02-14 NOTE — DISCHARGE NOTE ADULT - PATIENT PORTAL LINK FT
You can access the [facility] Patient Portal, offered by [facility], by registering with the following website: [email or web address]

## 2019-02-14 NOTE — PROGRESS NOTE ADULT - SUBJECTIVE AND OBJECTIVE BOX
NSGY Attg:    Repeat CT reviewed.  Case discussed with Dr. Olvera (neuro-intervenional).  Given the patient's imaging findings, age, and no hypertensive history, recommend inpatient angiography.    A/P:  - tx to [facility] NICU for additional supportive care and angio

## 2019-02-14 NOTE — PROGRESS NOTE ADULT - PROBLEM SELECTOR PLAN 2
Possibly related to jaw surgery  U/S for further workup See above   Managing with labetalol and nicardipine

## 2019-02-14 NOTE — DISCHARGE NOTE ADULT - HOSPITAL COURSE
48M with a PMHx only of heavy ETOH use. Presents this morning with sudden onset of Right sided weakness.  Head CT showed a acute Left parenchymal ICH, with mild edema and mass effect. Subsequently, his exam worsen with AMS and Hemiparesis. F/U CT scan showed progression of graham Bleed with Edema and Midline shift despit BP control. No Vascular Anomaly was seen. Dr Kendrick from Neurosurgery arranged fror transfer to [facility] Neuro intensive care unit for further management and possible Cerebral Angiogram.

## 2019-02-14 NOTE — H&P ADULT - ASSESSMENT
47 y/o male who presented s/p b/l arm and leg weakness found on CT to have a 2x2 hemorrhage in his basal ganglia, with no mass effect or significant surrounding edema. On initial physical exam performed in ED it was reported that he had b/l leg and arm weakness, dysarthria, expressive aphagia, hemianopsia. Course could be complicated by the fact that pt consumes 8-10 beers/day and may experience withdrawal symptoms that could skew neuro exam. On repeat exam pt had improvement in neurological findings but due to need for q1 neurological checks and potential for hemorrhage to worsen the pt warrants ICU admission. 47 y/o male who presented s/p b/l arm and leg weakness found on CT to have a [id number] hemorrhage in his basal ganglia, with minimal mass effect and mild surrounding edema. Course could be complicated by the fact that pt consumes 8-10 beers/day and may experience withdrawal symptoms that could skew neuro exam.  BP was noted ot still be elevated in the ER and IV Cardene infusion was completed.       I discussed the findings of the CT scans with the family at length at the bedside and they understand the current plan.    I spent a total of 60 mins evaluatingand treatingthis critical pateint, including reviewing all records, speaking to consultants as well as the family and staff.

## 2019-02-14 NOTE — H&P ADULT - ASSESSMENT
48 year old male with a past medical history of ETOH abuse presented to [facility] with generalized weakness which progressed to right sided weakness and aphasia found with left basal ganglia hemorrhage. He was admitted to the ICU at [place] where he developed worsening aphasia and right sided weakness and CT head revealed worsening basal ganglia hemorrhage, cerebral edema and midline shift. Transferred to [facility] for further management and cerebral angiogram.         Plan:   -Admit to NSCU  -Q1 hour Stroke neuro checks  -Repeat CT head at 9pm for stability  -Plan for cerebral angiogram possibly tomorrow morning  -Start 2% NaCl for cerebral edema; goal Na 140-150  -Keep -160, nicardipine infusion, will need arterial line placed  -Check HgbA1C, lipid profile  -Check TTE  -HISS  -CIWA; MVI, Thiamine, Folate      Spectralink # [id number] 48 year old male with a past medical history of ETOH abuse presented to [facility] with generalized weakness which progressed to right sided weakness and aphasia found with left basal ganglia hemorrhage. He was admitted to the ICU at [place] where he developed worsening aphasia and right sided weakness and CT head revealed worsening basal ganglia hemorrhage, cerebral edema and midline shift. Transferred to [facility] for further management and cerebral angiogram.         Plan:   -Admit to NSCU  -Q1 hour Stroke neuro checks  -Repeat CT head at 9pm for stability  -Plan for cerebral angiogram possibly tomorrow morning  -Start 2% NaCl for cerebral edema; goal Na 140-150  -Keep -160, nicardipine infusion, will need arterial line placed  -Check HgbA1C, lipid profile  -Check TTE  -HISS  -CIWA; MVI, Thiamine, Folate      Spectralink # [id number]

## 2019-02-14 NOTE — ED ADULT NURSE NOTE - ED STAT RN HANDOFF DETAILS
Report given and pt endorsed to receiving MARYLIN Cui for follow up and continuity of care. Upon arrival to MICU pt noted to have increase in aphasia and right sided weakness, TIM Hawthorne and Dr Severino came to bedside, pt endorsed to MICU for further care.

## 2019-02-14 NOTE — ED ADULT TRIAGE NOTE - CHIEF COMPLAINT QUOTE
Patient arrived to ED today with c/o weakness, near syncope.  Patient states he could not feel his legs.  Patient reports he did not eat yet today.  Patient denies medical history. Patient arrived to ED today with c/o weakness, near syncope, weakness to his legs.  Patient reports he did not eat yet today.  Patient denies medical history.  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  Patient states he is feeling better at this time and is feeling less weak.  .

## 2019-02-14 NOTE — DISCHARGE NOTE ADULT - PLAN OF CARE
Transfer to [facility] Transfer to [facility] for further management

## 2019-02-15 ENCOUNTER — APPOINTMENT (OUTPATIENT)
Age: 49
End: 2019-02-15

## 2019-02-15 LAB
ANION GAP SERPL CALC-SCNC: 12 MMOL/L — SIGNIFICANT CHANGE UP (ref 5–17)
ANION GAP SERPL CALC-SCNC: 13 MMOL/L — SIGNIFICANT CHANGE UP (ref 5–17)
ANION GAP SERPL CALC-SCNC: 13 MMOL/L — SIGNIFICANT CHANGE UP (ref 5–17)
ANION GAP SERPL CALC-SCNC: 15 MMOL/L — SIGNIFICANT CHANGE UP (ref 5–17)
APTT BLD: 27.9 SEC — SIGNIFICANT CHANGE UP (ref 27.5–36.3)
BASOPHILS # BLD AUTO: 0 K/UL — SIGNIFICANT CHANGE UP (ref 0–0.2)
BASOPHILS NFR BLD AUTO: 0 % — SIGNIFICANT CHANGE UP (ref 0–2)
BLD GP AB SCN SERPL QL: NEGATIVE — SIGNIFICANT CHANGE UP
BUN SERPL-MCNC: 7 MG/DL — SIGNIFICANT CHANGE UP (ref 7–23)
BUN SERPL-MCNC: 8 MG/DL — SIGNIFICANT CHANGE UP (ref 7–23)
BUN SERPL-MCNC: 8 MG/DL — SIGNIFICANT CHANGE UP (ref 7–23)
BUN SERPL-MCNC: 86 MG/DL — HIGH (ref 7–23)
CALCIUM SERPL-MCNC: 7.7 MG/DL — LOW (ref 8.4–10.5)
CALCIUM SERPL-MCNC: 8.1 MG/DL — LOW (ref 8.4–10.5)
CALCIUM SERPL-MCNC: 8.6 MG/DL — SIGNIFICANT CHANGE UP (ref 8.4–10.5)
CALCIUM SERPL-MCNC: 9 MG/DL — SIGNIFICANT CHANGE UP (ref 8.4–10.5)
CHLORIDE SERPL-SCNC: 101 MMOL/L — SIGNIFICANT CHANGE UP (ref 96–108)
CHLORIDE SERPL-SCNC: 102 MMOL/L — SIGNIFICANT CHANGE UP (ref 96–108)
CHLORIDE SERPL-SCNC: 107 MMOL/L — SIGNIFICANT CHANGE UP (ref 96–108)
CHLORIDE SERPL-SCNC: 114 MMOL/L — HIGH (ref 96–108)
CHOLEST SERPL-MCNC: 180 MG/DL — SIGNIFICANT CHANGE UP (ref 10–199)
CO2 SERPL-SCNC: 20 MMOL/L — LOW (ref 22–31)
CO2 SERPL-SCNC: 21 MMOL/L — LOW (ref 22–31)
CO2 SERPL-SCNC: 22 MMOL/L — SIGNIFICANT CHANGE UP (ref 22–31)
CO2 SERPL-SCNC: 22 MMOL/L — SIGNIFICANT CHANGE UP (ref 22–31)
CREAT SERPL-MCNC: 0.63 MG/DL — SIGNIFICANT CHANGE UP (ref 0.5–1.3)
CREAT SERPL-MCNC: 0.63 MG/DL — SIGNIFICANT CHANGE UP (ref 0.5–1.3)
CREAT SERPL-MCNC: 0.67 MG/DL — SIGNIFICANT CHANGE UP (ref 0.5–1.3)
CREAT SERPL-MCNC: 1.72 MG/DL — HIGH (ref 0.5–1.3)
EOSINOPHIL # BLD AUTO: 0.1 K/UL — SIGNIFICANT CHANGE UP (ref 0–0.5)
EOSINOPHIL NFR BLD AUTO: 0.6 % — SIGNIFICANT CHANGE UP (ref 0–6)
GAS PNL BLDA: SIGNIFICANT CHANGE UP
GLUCOSE SERPL-MCNC: 139 MG/DL — HIGH (ref 70–99)
GLUCOSE SERPL-MCNC: 173 MG/DL — HIGH (ref 70–99)
GLUCOSE SERPL-MCNC: 186 MG/DL — HIGH (ref 70–99)
GLUCOSE SERPL-MCNC: 187 MG/DL — HIGH (ref 70–99)
HCT VFR BLD CALC: 38.9 % — LOW (ref 39–50)
HCT VFR BLD CALC: 39.6 % — SIGNIFICANT CHANGE UP (ref 39–50)
HDLC SERPL-MCNC: 62 MG/DL — SIGNIFICANT CHANGE UP
HGB BLD-MCNC: 13.8 G/DL — SIGNIFICANT CHANGE UP (ref 13–17)
HGB BLD-MCNC: 14.4 G/DL — SIGNIFICANT CHANGE UP (ref 13–17)
INR BLD: 1 RATIO — SIGNIFICANT CHANGE UP (ref 0.88–1.16)
LIPID PNL WITH DIRECT LDL SERPL: 106 MG/DL — SIGNIFICANT CHANGE UP
LYMPHOCYTES # BLD AUTO: 0.8 K/UL — LOW (ref 1–3.3)
LYMPHOCYTES # BLD AUTO: 9.6 % — LOW (ref 13–44)
MAGNESIUM SERPL-MCNC: 2.2 MG/DL — SIGNIFICANT CHANGE UP (ref 1.6–2.6)
MAGNESIUM SERPL-MCNC: 2.4 MG/DL — SIGNIFICANT CHANGE UP (ref 1.6–2.6)
MCHC RBC-ENTMCNC: 32.4 PG — SIGNIFICANT CHANGE UP (ref 27–34)
MCHC RBC-ENTMCNC: 32.9 PG — SIGNIFICANT CHANGE UP (ref 27–34)
MCHC RBC-ENTMCNC: 35.6 GM/DL — SIGNIFICANT CHANGE UP (ref 32–36)
MCHC RBC-ENTMCNC: 36.3 GM/DL — HIGH (ref 32–36)
MCV RBC AUTO: 90.8 FL — SIGNIFICANT CHANGE UP (ref 80–100)
MCV RBC AUTO: 91.1 FL — SIGNIFICANT CHANGE UP (ref 80–100)
MONOCYTES # BLD AUTO: 0.5 K/UL — SIGNIFICANT CHANGE UP (ref 0–0.9)
MONOCYTES NFR BLD AUTO: 5.8 % — SIGNIFICANT CHANGE UP (ref 2–14)
NEUTROPHILS # BLD AUTO: 7.4 K/UL — SIGNIFICANT CHANGE UP (ref 1.8–7.4)
NEUTROPHILS NFR BLD AUTO: 83.9 % — HIGH (ref 43–77)
PHOSPHATE SERPL-MCNC: 2 MG/DL — LOW (ref 2.5–4.5)
PHOSPHATE SERPL-MCNC: 3.3 MG/DL — SIGNIFICANT CHANGE UP (ref 2.5–4.5)
PLATELET # BLD AUTO: 149 K/UL — LOW (ref 150–400)
PLATELET # BLD AUTO: 173 K/UL — SIGNIFICANT CHANGE UP (ref 150–400)
POTASSIUM SERPL-MCNC: 3.7 MMOL/L — SIGNIFICANT CHANGE UP (ref 3.5–5.3)
POTASSIUM SERPL-MCNC: 4.8 MMOL/L — SIGNIFICANT CHANGE UP (ref 3.5–5.3)
POTASSIUM SERPL-SCNC: 3.7 MMOL/L — SIGNIFICANT CHANGE UP (ref 3.5–5.3)
POTASSIUM SERPL-SCNC: 4.8 MMOL/L — SIGNIFICANT CHANGE UP (ref 3.5–5.3)
PROTHROM AB SERPL-ACNC: 11.5 SEC — SIGNIFICANT CHANGE UP (ref 10–12.9)
RBC # BLD: 4.27 M/UL — SIGNIFICANT CHANGE UP (ref 4.2–5.8)
RBC # BLD: 4.36 M/UL — SIGNIFICANT CHANGE UP (ref 4.2–5.8)
RBC # FLD: 11.9 % — SIGNIFICANT CHANGE UP (ref 10.3–14.5)
RBC # FLD: 12.1 % — SIGNIFICANT CHANGE UP (ref 10.3–14.5)
RH IG SCN BLD-IMP: POSITIVE — SIGNIFICANT CHANGE UP
SODIUM SERPL-SCNC: 136 MMOL/L — SIGNIFICANT CHANGE UP (ref 135–145)
SODIUM SERPL-SCNC: 139 MMOL/L — SIGNIFICANT CHANGE UP (ref 135–145)
SODIUM SERPL-SCNC: 140 MMOL/L — SIGNIFICANT CHANGE UP (ref 135–145)
SODIUM SERPL-SCNC: 147 MMOL/L — HIGH (ref 135–145)
T3 SERPL-MCNC: 114 NG/DL — SIGNIFICANT CHANGE UP (ref 80–200)
T4 AB SER-ACNC: 7.2 UG/DL — SIGNIFICANT CHANGE UP (ref 4.6–12)
TOTAL CHOLESTEROL/HDL RATIO MEASUREMENT: 2.9 RATIO — LOW (ref 3.4–9.6)
TRIGL SERPL-MCNC: 61 MG/DL — SIGNIFICANT CHANGE UP (ref 10–149)
TSH SERPL-MCNC: 1.1 UIU/ML — SIGNIFICANT CHANGE UP (ref 0.27–4.2)
WBC # BLD: 8.6 K/UL — SIGNIFICANT CHANGE UP (ref 3.8–10.5)
WBC # BLD: 8.8 K/UL — SIGNIFICANT CHANGE UP (ref 3.8–10.5)
WBC # FLD AUTO: 8.6 K/UL — SIGNIFICANT CHANGE UP (ref 3.8–10.5)
WBC # FLD AUTO: 8.8 K/UL — SIGNIFICANT CHANGE UP (ref 3.8–10.5)

## 2019-02-15 PROCEDURE — 99291 CRITICAL CARE FIRST HOUR: CPT

## 2019-02-15 PROCEDURE — 93010 ELECTROCARDIOGRAM REPORT: CPT

## 2019-02-15 PROCEDURE — 70450 CT HEAD/BRAIN W/O DYE: CPT | Mod: 26

## 2019-02-15 PROCEDURE — 70450 CT HEAD/BRAIN W/O DYE: CPT | Mod: 26,77

## 2019-02-15 PROCEDURE — 99292 CRITICAL CARE ADDL 30 MIN: CPT

## 2019-02-15 PROCEDURE — 93306 TTE W/DOPPLER COMPLETE: CPT | Mod: 26

## 2019-02-15 PROCEDURE — ZZZZZ: CPT

## 2019-02-15 RX ORDER — DEXMEDETOMIDINE HYDROCHLORIDE IN 0.9% SODIUM CHLORIDE 4 UG/ML
0.2 INJECTION INTRAVENOUS
Qty: 200 | Refills: 0 | Status: DISCONTINUED | OUTPATIENT
Start: 2019-02-15 | End: 2019-02-20

## 2019-02-15 RX ORDER — FOLIC ACID 0.8 MG
1 TABLET ORAL DAILY
Qty: 0 | Refills: 0 | Status: DISCONTINUED | OUTPATIENT
Start: 2019-02-15 | End: 2019-02-25

## 2019-02-15 RX ORDER — SODIUM CHLORIDE 9 MG/ML
2 INJECTION INTRAMUSCULAR; INTRAVENOUS; SUBCUTANEOUS EVERY 8 HOURS
Qty: 0 | Refills: 0 | Status: DISCONTINUED | OUTPATIENT
Start: 2019-02-15 | End: 2019-02-25

## 2019-02-15 RX ORDER — SODIUM CHLORIDE 9 MG/ML
1000 INJECTION INTRAMUSCULAR; INTRAVENOUS; SUBCUTANEOUS
Qty: 0 | Refills: 0 | Status: DISCONTINUED | OUTPATIENT
Start: 2019-02-15 | End: 2019-02-15

## 2019-02-15 RX ORDER — HALOPERIDOL DECANOATE 100 MG/ML
2.5 INJECTION INTRAMUSCULAR ONCE
Qty: 0 | Refills: 0 | Status: DISCONTINUED | OUTPATIENT
Start: 2019-02-15 | End: 2019-02-18

## 2019-02-15 RX ORDER — FENTANYL CITRATE 50 UG/ML
50 INJECTION INTRAVENOUS ONCE
Qty: 0 | Refills: 0 | Status: DISCONTINUED | OUTPATIENT
Start: 2019-02-15 | End: 2019-02-15

## 2019-02-15 RX ORDER — HALOPERIDOL DECANOATE 100 MG/ML
2.5 INJECTION INTRAMUSCULAR ONCE
Qty: 0 | Refills: 0 | Status: COMPLETED | OUTPATIENT
Start: 2019-02-15 | End: 2019-02-15

## 2019-02-15 RX ORDER — LABETALOL HCL 100 MG
200 TABLET ORAL EVERY 8 HOURS
Qty: 0 | Refills: 0 | Status: DISCONTINUED | OUTPATIENT
Start: 2019-02-15 | End: 2019-02-16

## 2019-02-15 RX ORDER — THIAMINE MONONITRATE (VIT B1) 100 MG
100 TABLET ORAL DAILY
Qty: 0 | Refills: 0 | Status: DISCONTINUED | OUTPATIENT
Start: 2019-02-15 | End: 2019-02-15

## 2019-02-15 RX ORDER — LABETALOL HCL 100 MG
200 TABLET ORAL EVERY 8 HOURS
Qty: 0 | Refills: 0 | Status: DISCONTINUED | OUTPATIENT
Start: 2019-02-15 | End: 2019-02-15

## 2019-02-15 RX ORDER — SODIUM CHLORIDE 5 G/100ML
1000 INJECTION, SOLUTION INTRAVENOUS
Qty: 0 | Refills: 0 | Status: DISCONTINUED | OUTPATIENT
Start: 2019-02-15 | End: 2019-02-19

## 2019-02-15 RX ORDER — THIAMINE MONONITRATE (VIT B1) 100 MG
100 TABLET ORAL DAILY
Qty: 0 | Refills: 0 | Status: DISCONTINUED | OUTPATIENT
Start: 2019-02-15 | End: 2019-02-25

## 2019-02-15 RX ORDER — SODIUM CHLORIDE 9 MG/ML
2 INJECTION INTRAMUSCULAR; INTRAVENOUS; SUBCUTANEOUS EVERY 8 HOURS
Qty: 0 | Refills: 0 | Status: DISCONTINUED | OUTPATIENT
Start: 2019-02-15 | End: 2019-02-15

## 2019-02-15 RX ADMIN — FENTANYL CITRATE 50 MICROGRAM(S): 50 INJECTION INTRAVENOUS at 10:05

## 2019-02-15 RX ADMIN — Medication 25 MILLIGRAM(S): at 22:40

## 2019-02-15 RX ADMIN — Medication 50 GRAM(S): at 00:11

## 2019-02-15 RX ADMIN — HALOPERIDOL DECANOATE 2.5 MILLIGRAM(S): 100 INJECTION INTRAMUSCULAR at 16:45

## 2019-02-15 RX ADMIN — Medication 2 MILLIGRAM(S): at 17:00

## 2019-02-15 RX ADMIN — SODIUM CHLORIDE 100 MILLILITER(S): 9 INJECTION INTRAMUSCULAR; INTRAVENOUS; SUBCUTANEOUS at 18:10

## 2019-02-15 RX ADMIN — Medication 1 TABLET(S): at 11:24

## 2019-02-15 RX ADMIN — Medication 200 MILLIGRAM(S): at 21:02

## 2019-02-15 RX ADMIN — Medication 4 MILLIGRAM(S): at 03:52

## 2019-02-15 RX ADMIN — Medication 1 MILLIGRAM(S): at 00:12

## 2019-02-15 RX ADMIN — Medication 100 MILLIGRAM(S): at 11:25

## 2019-02-15 RX ADMIN — Medication 2 MILLIGRAM(S): at 15:00

## 2019-02-15 RX ADMIN — Medication 1 TABLET(S): at 00:12

## 2019-02-15 RX ADMIN — Medication 4 MILLIGRAM(S): at 00:13

## 2019-02-15 RX ADMIN — Medication 100 MILLIGRAM(S): at 20:59

## 2019-02-15 RX ADMIN — Medication 1 MILLIGRAM(S): at 11:24

## 2019-02-15 RX ADMIN — Medication 2 MILLIGRAM(S): at 08:16

## 2019-02-15 RX ADMIN — Medication 2 MILLIGRAM(S): at 20:59

## 2019-02-15 RX ADMIN — FENTANYL CITRATE 50 MICROGRAM(S): 50 INJECTION INTRAVENOUS at 09:40

## 2019-02-15 RX ADMIN — SODIUM CHLORIDE 75 MILLILITER(S): 5 INJECTION, SOLUTION INTRAVENOUS at 03:53

## 2019-02-15 RX ADMIN — SODIUM CHLORIDE 2 GRAM(S): 9 INJECTION INTRAMUSCULAR; INTRAVENOUS; SUBCUTANEOUS at 21:02

## 2019-02-15 RX ADMIN — Medication 100 MILLIGRAM(S): at 00:12

## 2019-02-15 RX ADMIN — Medication 40 MILLIEQUIVALENT(S): at 00:12

## 2019-02-15 RX ADMIN — NICARDIPINE HYDROCHLORIDE 25 MG/HR: 30 CAPSULE, EXTENDED RELEASE ORAL at 03:54

## 2019-02-15 RX ADMIN — DEXMEDETOMIDINE HYDROCHLORIDE IN 0.9% SODIUM CHLORIDE 4.51 MICROGRAM(S)/KG/HR: 4 INJECTION INTRAVENOUS at 16:30

## 2019-02-15 RX ADMIN — Medication 4 MILLIGRAM(S): at 05:52

## 2019-02-15 RX ADMIN — SODIUM CHLORIDE 50 MILLILITER(S): 5 INJECTION, SOLUTION INTRAVENOUS at 22:30

## 2019-02-15 NOTE — PROGRESS NOTE ADULT - SUBJECTIVE AND OBJECTIVE BOX
SUMMARY:  48 year old man unknown PMH transferred from outside hospital for left ICH. He was admitted to the ICU and had worsening weakness, repeat CT head showed increase in ICH. He drinks 6 beers a day, not in the morning  Does not take any medications at home      am CTH with increase heme, conventional angiogram negative, repeat CTH in pm stable  extremely agitated, requiring ativan/haldol/precedex gtt    VITAL SIGNS:  Reviewed   IMAGING: Reviewed  MEDICATIONS:  Reviewed     Physical Exam:  EXAMINATION:  General: No acute distress  HEENT: Anicteric sclerae  Cardiac: [id number]  Lungs: Clear  Abdomen: Soft, non-tender, +BS  Extremities: No c/c/e  Skin/Incision Site: Clean, dry and intact  NEURO: sedated, eyes open spontaneously, global aphasia, mumbles some words, not following commands, moves left side with 5/5 strength spontaneously, RUE 2/5 proximally, RLE 3/5 purposeful

## 2019-02-15 NOTE — PROGRESS NOTE ADULT - ASSESSMENT
48M here with BG hemorrhage  - Stroke neuro eval  - Stroke core measures  - Q1hr neurochecks until stability scan  - Cont supportive care per ICU  - If 24hr scan stable no further neurosurgical intervention anticipated

## 2019-02-15 NOTE — PROGRESS NOTE ADULT - ASSESSMENT
ASSESSMENT/PLAN:  Left BG ICH, expanded on repeat CT head, CTA no vascular malformation   conventional angiogram negative  has cytotoxic edema   most likley HTN  ICH score 1    Alcoholic - drinks 12 beers/day for several years, [facility] protocol  librium taper, wean precedex gtt as able  Stroke core measures, Stroke consult   hypertonics for Na 140-150  -160  start tube feeding in am  repeat CTH in am, if stable start DVT ppx  monitor for fevers    VTE prophylaxis: [x] SCDs     CODE STATUS:  [x] Full Code [] DNR [] DNI [] Palliative/Comfort Care    DISPOSITION:  [X] ICU [] Stroke Unit [] Floor [] EMU [] RCU [] PCU    all family members updated at bedside extensively     Time spent: 45 [x] critical care minutes    Contact: [phone] ASSESSMENT/PLAN:  Left BG ICH, expanded on repeat CT head, CTA no vascular malformation   conventional angiogram negative  has cytotoxic edema   most likley HTN  ICH score 1    Alcoholic - drinks 12 beers/day for several years, [facility] protocol  librium taper, wean precedex gtt as able  Stroke core measures, Stroke consult   hypertonics for Na 140-150  -160  hypoxia likely due to agitation and sedatives, repeat ABG, may need high flow nasal cannula  start tube feeding in am  repeat CTH in am, if stable start DVT ppx  monitor for fevers    VTE prophylaxis: [x] SCDs     CODE STATUS:  [x] Full Code [] DNR [] DNI [] Palliative/Comfort Care    DISPOSITION:  [X] ICU [] Stroke Unit [] Floor [] EMU [] RCU [] PCU    all family members updated at bedside extensively     Time spent: 45 [x] critical care minutes    Contact: [phone]

## 2019-02-15 NOTE — CHART NOTE - NSCHARTNOTEFT_GEN_A_CORE
Interventional Neuro Radiology  Pre-Procedure Note    This is a 47yo male with a past medical history of ETOH abuse (drinks 5 beers/day) who presented to [facility] after an episode of sudden onset bilateral leg and arm weakness. Imaging revealed an acute left basal ganglia ICH with cerebral edema, brain compression and mass effect. He was admitted to the ICU at [facility] where he had worsening mental status with worsening right sided hemiparesis. Repeat CT head was performed and revealed worsening basal ganglia hemorrhage, cerebral edema, and midline shift. He was placed on a Nicardipine infusion for strict blood pressure control and transferred to [facility] for further neurosurgical intervention. After discussion with neurosurgery patient presents today to neuro IR for selctive cerebral angiography for further evaluation.     Neuro Exam: Awake, eyes open spontaneously, global aphasia, mumbles some words, not following commands, moves left side with 5/5 strength spontaneously, RUE 0/5, RLE 3/5, pupils 3mm and brisk bilaterally, extraocular movements intact    PAST MEDICAL & SURGICAL HISTORY:  ETOH abuse  Jaw fracture  Cyst of parotid gland: Left x 15 years  ICH (intracerebral hemorrhage)  Fracture, jaw: with surgical repair    Social History:   ETOH abuse    FAMILY HISTORY:  Family history of hypertension (Mother)    Allergies:   No Known Allergies    Current Medications:   folic acid 1 milliGRAM(s) Oral daily  LORazepam   Injectable 2 milliGRAM(s) IntraMuscular every 2 hours PRN  multivitamin 1 Tablet(s) Oral daily  niCARdipine Infusion 5 mG/Hr IV Continuous <Continuous>  sodium chloride 2% . 1000 milliLiter(s) IV Continuous <Continuous>  thiamine 100 milliGRAM(s) Oral daily      Labs:                         14.4   8.6   )-----------( 173      ( 15 Feb 2019 01:17 )             39.6       02-15    136  |  101  |  8   ----------------------------<  186<H>  3.7   |  22  |  0.67    Ca    9.0      15 Feb 2019 09:21  Phos  2.0     02-15  Mg     2.4     02-15    TPro  7.9  /  Alb  4.7  /  TBili  0.4  /  DBili  x   /  AST  30  /  ALT  39  /  AlkPhos  68  02-14        Blood Bank: 02-15-19  O  --  Positive      Assessment/Plan:   This is a 47yo male  presents with basal ganglia hemorrhage. Patient presents to neuro-IR for selective cerebral angiography. Procedure/ risks/ benefits/ goals/ alternatives were explained. Risks include but are not limited to stroke/ vessel injury/ hemorrhage/ groin hematoma. All questions answered. Informed content obtained from patient's mother via telephone consent. Consent placed in chart.    Yelena Mcgee PA-C  [phone]

## 2019-02-15 NOTE — PROGRESS NOTE ADULT - SUBJECTIVE AND OBJECTIVE BOX
Visit Summary: Patient seen and evaluated at bedside.    Overnight Events: none    Exam:  T(C): 37.1 (02-14-19 @ 23:00), Max: 37.1 (02-14-19 @ 23:00)  HR: 98 (02-14-19 @ 23:45) (77 - 108)  BP: 144/82 (02-14-19 @ 19:30) (126/83 - 178/109)  RR: 24 (02-14-19 @ 23:45) (9 - 60)  SpO2: 96% (02-14-19 @ 23:45) (93% - 100%)  Wt(kg): --    Awake, eyes open spontaneously, global aphasia, mumbles some words, not following commands, moves left side with 5/5 strength spontaneously, RUE 0/5, RLE 3/5, pupils 3mm and brisk bilaterally, extraocular movements intact                        14.8   4.2   )-----------( 150      ( 14 Feb 2019 10:09 )             42.7     02-14    139  |  99  |  8   ----------------------------<  147<H>  3.7   |  22  |  0.60    Ca    9.1      14 Feb 2019 19:27  Phos  3.4     02-14  Mg     1.8     02-14    TPro  7.9  /  Alb  4.7  /  TBili  0.4  /  DBili  x   /  AST  30  /  ALT  39  /  AlkPhos  68  02-14  PT/INR - ( 14 Feb 2019 10:09 )   PT: 10.6 sec;   INR: 0.92 ratio         PTT - ( 14 Feb 2019 10:09 )  PTT:26.4 sec

## 2019-02-15 NOTE — CHART NOTE - NSCHARTNOTEFT_GEN_A_CORE
Interventional Neuro- Radiology   Procedure Note    Procedure: Selective Cerebral Angiography   Pre- Procedure Diagnosis: Basal Ganglia Hemorrhage   Post- Procedure Diagnosis: No aneurysm/ vascular malformations     : Dr. Wade MD  Fellow: Dr. Isreal MD  Resident: Dr. Philippe MD  Physician Assistant: Yelena Mcgee PA-C    RN: Saida     Anesthesia: Dr. Ty MD   (general anesthesia)    I/Os:  Fluids: 500cc DTV   Contrast: 127cc  Estimated Blood Loss: <10cc    Vitals: BP=   149/81        HR=   100       SpO2= 100 %    Preliminary Report:  Under general anesthesia, using a 4Fr short sheath to the right groin examination of left vertebral artery/ left common carotid artery/ left external carotid artery/ left internal carotid artery/ right vertebral artery/ right common carotid artery/ right external carotid artery/ right internal carotid artery via selective cerebral angiography demonstrates no aneurysms or vascular malformations. ( Official note to follow).    Patient tolerated procedure well, vital signs stable, hemodynamically stable, no change in neurological status compared to baseline. Results discussed with neurosurgery/ patient's family. Groin sheath d/c'ed, manual compression held to hemostasis, no active bleeding, no hematoma, quick clot and safeguard balloon dressing applied at 13:55h. STAT labs, CBC/BMP at 18:00h. Patient transferred to SICU for further care/ monitoring.     Yelena Mcgee PA-C  [phone] Interventional Neuro- Radiology   Procedure Note    Procedure: Selective Cerebral Angiography   Pre- Procedure Diagnosis: Basal Ganglia Hemorrhage   Post- Procedure Diagnosis: No aneurysm/ vascular malformations     : Dr. Wade MD  Fellow: Dr. Isreal MD  Resident: Dr. Philippe MD  Physician Assistant: Yelena Mcgee PA-C    RN: Saida     Anesthesia: Dr. Ty MD   (general anesthesia)    I/Os:  Fluids: 500cc DTV   Contrast: 127cc  Estimated Blood Loss: <10cc    Vitals: BP=   149/81        HR=   100       SpO2= 100 %    Preliminary Report:  Under general anesthesia, using a 4Fr short sheath to the right groin examination of left vertebral artery/ left common carotid artery/ left external carotid artery/ left internal carotid artery/ right vertebral artery/ right common carotid artery/ right external carotid artery via selective cerebral angiography demonstrates no aneurysms or vascular malformations. ( Official note to follow).    Patient tolerated procedure well, vital signs stable, hemodynamically stable, no change in neurological status compared to baseline. Results discussed with neurosurgery/ patient's family. Groin sheath d/c'ed, manual compression held to hemostasis, no active bleeding, no hematoma, quick clot and safeguard balloon dressing applied at 13:55h. STAT labs, CBC/BMP at 18:00h. Patient transferred to SICU for further care/ monitoring.     Yelena Mcgee PA-C  [phone]

## 2019-02-15 NOTE — PROGRESS NOTE ADULT - SUBJECTIVE AND OBJECTIVE BOX
O: went to IR today, no vascular malformation  overnight was going into withdrawal and was placed on ativan    T(C): 36.6 (02-15-19 @ 15:00), Max: 37.2 (02-15-19 @ 03:00)  HR: 101 (02-15-19 @ 15:30) (88 - 115)  BP: 144/82 (02-14-19 @ 19:30) (126/83 - 162/94)  RR: 25 (02-15-19 @ 15:30) (9 - 60)  SpO2: 95% (02-15-19 @ 15:30) (82% - 99%)  02-14-19 @ 07:01  -  02-15-19 @ 07:00  --------------------------------------------------------  IN: 1265 mL / OUT: 2350 mL / NET: -1085 mL    02-15-19 @ 07:01  -  02-15-19 @ 15:40  --------------------------------------------------------  IN: 675 mL / OUT: 0 mL / NET: 675 mL    folic acid 1 milliGRAM(s) Oral daily  LORazepam   Injectable 2 milliGRAM(s) IntraMuscular every 2 hours PRN  multivitamin 1 Tablet(s) Oral daily  niCARdipine Infusion 5 mG/Hr IV Continuous <Continuous>  sodium chloride 2% . 1000 milliLiter(s) IV Continuous <Continuous>  thiamine 100 milliGRAM(s) Oral daily      PHYSICAL EXAM:    General: No Acute Distress     Neurological: Awake, expressive and receptive aphasia, not Following Commands, PERRL, EOMI, right facial, 5/5 on the left side, 0/5 in the right UE, 0/5 in the right LE     Pulmonary: Clear to Auscultation, No Rales, No Rhonchi, No Wheezes     Cardiovascular: S1, S2, Regular Rate and Rhythm     Gastrointestinal: Soft, Nontender, Nondistended     Extremities: No calf tenderness     Incision:       LABS:  Na: 136 (02-15 @ 09:21), 139 (02-15 @ 01:17), 139 (02-14 @ 19:27), 138 (02-14 @ 10:09)  K: 3.7 (02-15 @ 09:21), 3.7 (02-15 @ 01:17), 3.7 (02-14 @ 19:27), 3.9 (02-14 @ 10:09)  Cl: 101 (02-15 @ 09:21), 102 (02-15 @ 01:17), 99 (02-14 @ 19:27), 99 (02-14 @ 10:09)  CO2: 22 (02-15 @ 09:21), 22 (02-15 @ 01:17), 22 (02-14 @ 19:27), 27.0 (02-14 @ 10:09)  BUN: 8 (02-15 @ 09:21), 8 (02-15 @ 01:17), 8 (02-14 @ 19:27), 8.0 (02-14 @ 10:09)  Cr: 0.67 (02-15 @ 09:21), 0.63 (02-15 @ 01:17), 0.60 (02-14 @ 19:27), 0.71 (02-14 @ 10:09)  Glu: 186(02-15 @ 09:21), 139(02-15 @ 01:17), 147(02-14 @ 19:27), 184(02-14 @ 10:09)    Hgb: 14.4 (02-15 @ 01:17), 14.8 (02-14 @ 10:09)  Hct: 39.6 (02-15 @ 01:17), 42.7 (02-14 @ 10:09)  WBC: 8.6 (02-15 @ 01:17), 4.2 (02-14 @ 10:09)  Plt: 173 (02-15 @ 01:17), 150 (02-14 @ 10:09)    INR: 1.00 02-15-19 @ 01:17, 0.92 02-14-19 @ 10:09  PTT: 27.9 02-15-19 @ 01:17, 26.4 02-14-19 @ 10:09        EXAM:  CT BRAIN                            PROCEDURE DATE:  02/15/2019            INTERPRETATION:  History: Intracranial hemorrhage follow-up. Basal   ganglia bleed.    Description: A noncontrast head CT is compared to the prior CT study from   02/14/2019.    An evolving acute hematoma is again noted involving the left basal   ganglia and corona radiata, mildly increased in overall size. The   hematoma currently measures 4 cm x 3.7 cm x 5.4 cm, previously measuring   3.7 cm x 3.1 cm x 5 cm whenmeasured at the same levels. Surrounding   vasogenic edema and mass effect is again noted. The left to right shift   has mildly increased, currently measuring 4 mm.    The right lateral ventricle appears slightly larger which may reflect   entrapment from the midline shift.    No new acute intracranial hemorrhage is are noted.    A left parotid mass is again noted, unchanged.    IMPRESSION:    There has been a mild interval increase in size of the left basal ganglia   hematoma. The mild left to right midline shift is also increased.             DEVICES: [] Restraints [] LIZBETH/HMV []LD [] ET tube [] Trach [] Chest Tube [] A-line [] Sterling [] NGT [] Rectal Tube       A/P:  Left BG ICH, expanded on repeat CT head, CTA no vascular malformation   has cytotoxic edema   most likley HTN  ICH score 1    Neuro: ICH expanded will repeat head CT tonight, neuro checks q 1 hr, NPO until repeat head CT  alcoholic, d/c ativan standing, us as needed   thiamine   2% 75 ml/hr for cerebral edema, will get BMP now, wean nicardipine as sodium is not increasing, start labetolol 300 mg q 8 hrs   Respiratory: RA  CV: SBP gaol 100-140 mmhg, NICARDIPINE , labetolol 300 mg q 8 hrs   Endocrine: finger sticks q 6 hrs, IS  Heme/Onc:   coags, INR, PTT          DVT ppx:  SCD  Renal: 2% 75 ml/hr, BMP q 6 hrs , sodium goal 140-145 , add salt tablets   ID: afebrile   GI: NPO for stability scan   Social/Family: ICU  Discharge planning: ICU      Code Status: [x] Full Code [] DNR [] DNI [] Goals of Care:   Disposition: [x] ICU [] Stroke Unit [] RCU []PCU []Floor [] Discharge Home     Patient at high risk for neurologic deterioration, ICH expanding, seizures    critical care time, excluding procedures: 35 minutes

## 2019-02-16 LAB
ALBUMIN SERPL ELPH-MCNC: 3.8 G/DL — SIGNIFICANT CHANGE UP (ref 3.3–5)
ALP SERPL-CCNC: 49 U/L — SIGNIFICANT CHANGE UP (ref 40–120)
ALT FLD-CCNC: 22 U/L — SIGNIFICANT CHANGE UP (ref 10–45)
ANION GAP SERPL CALC-SCNC: 11 MMOL/L — SIGNIFICANT CHANGE UP (ref 5–17)
ANION GAP SERPL CALC-SCNC: 13 MMOL/L — SIGNIFICANT CHANGE UP (ref 5–17)
ANION GAP SERPL CALC-SCNC: 14 MMOL/L — SIGNIFICANT CHANGE UP (ref 5–17)
APPEARANCE UR: CLEAR — SIGNIFICANT CHANGE UP
AST SERPL-CCNC: 16 U/L — SIGNIFICANT CHANGE UP (ref 10–40)
BILIRUB SERPL-MCNC: 0.8 MG/DL — SIGNIFICANT CHANGE UP (ref 0.2–1.2)
BILIRUB UR-MCNC: NEGATIVE — SIGNIFICANT CHANGE UP
BUN SERPL-MCNC: 10 MG/DL — SIGNIFICANT CHANGE UP (ref 7–23)
BUN SERPL-MCNC: 7 MG/DL — SIGNIFICANT CHANGE UP (ref 7–23)
BUN SERPL-MCNC: 8 MG/DL — SIGNIFICANT CHANGE UP (ref 7–23)
CALCIUM SERPL-MCNC: 7.8 MG/DL — LOW (ref 8.4–10.5)
CALCIUM SERPL-MCNC: 8.3 MG/DL — LOW (ref 8.4–10.5)
CALCIUM SERPL-MCNC: 8.4 MG/DL — SIGNIFICANT CHANGE UP (ref 8.4–10.5)
CHLORIDE SERPL-SCNC: 107 MMOL/L — SIGNIFICANT CHANGE UP (ref 96–108)
CHLORIDE SERPL-SCNC: 108 MMOL/L — SIGNIFICANT CHANGE UP (ref 96–108)
CHLORIDE SERPL-SCNC: 118 MMOL/L — HIGH (ref 96–108)
CO2 SERPL-SCNC: 19 MMOL/L — LOW (ref 22–31)
CO2 SERPL-SCNC: 20 MMOL/L — LOW (ref 22–31)
CO2 SERPL-SCNC: 20 MMOL/L — LOW (ref 22–31)
COLOR SPEC: SIGNIFICANT CHANGE UP
CREAT SERPL-MCNC: 0.71 MG/DL — SIGNIFICANT CHANGE UP (ref 0.5–1.3)
CREAT SERPL-MCNC: 0.75 MG/DL — SIGNIFICANT CHANGE UP (ref 0.5–1.3)
CREAT SERPL-MCNC: 0.92 MG/DL — SIGNIFICANT CHANGE UP (ref 0.5–1.3)
DIFF PNL FLD: NEGATIVE — SIGNIFICANT CHANGE UP
GAS PNL BLDA: SIGNIFICANT CHANGE UP
GAS PNL BLDA: SIGNIFICANT CHANGE UP
GLUCOSE SERPL-MCNC: 136 MG/DL — HIGH (ref 70–99)
GLUCOSE SERPL-MCNC: 169 MG/DL — HIGH (ref 70–99)
GLUCOSE SERPL-MCNC: 200 MG/DL — HIGH (ref 70–99)
GLUCOSE UR QL: NEGATIVE — SIGNIFICANT CHANGE UP
HCT VFR BLD CALC: 39.9 % — SIGNIFICANT CHANGE UP (ref 39–50)
HGB BLD-MCNC: 14.1 G/DL — SIGNIFICANT CHANGE UP (ref 13–17)
KETONES UR-MCNC: NEGATIVE — SIGNIFICANT CHANGE UP
LEUKOCYTE ESTERASE UR-ACNC: NEGATIVE — SIGNIFICANT CHANGE UP
MAGNESIUM SERPL-MCNC: 2 MG/DL — SIGNIFICANT CHANGE UP (ref 1.6–2.6)
MAGNESIUM SERPL-MCNC: 2.1 MG/DL — SIGNIFICANT CHANGE UP (ref 1.6–2.6)
MCHC RBC-ENTMCNC: 32.1 PG — SIGNIFICANT CHANGE UP (ref 27–34)
MCHC RBC-ENTMCNC: 35.3 GM/DL — SIGNIFICANT CHANGE UP (ref 32–36)
MCV RBC AUTO: 90.9 FL — SIGNIFICANT CHANGE UP (ref 80–100)
NITRITE UR-MCNC: NEGATIVE — SIGNIFICANT CHANGE UP
PH UR: 6 — SIGNIFICANT CHANGE UP (ref 5–8)
PHOSPHATE SERPL-MCNC: 2.2 MG/DL — LOW (ref 2.5–4.5)
PHOSPHATE SERPL-MCNC: 2.5 MG/DL — SIGNIFICANT CHANGE UP (ref 2.5–4.5)
PLATELET # BLD AUTO: 153 K/UL — SIGNIFICANT CHANGE UP (ref 150–400)
POTASSIUM SERPL-MCNC: 3.4 MMOL/L — LOW (ref 3.5–5.3)
POTASSIUM SERPL-MCNC: 3.7 MMOL/L — SIGNIFICANT CHANGE UP (ref 3.5–5.3)
POTASSIUM SERPL-MCNC: 3.9 MMOL/L — SIGNIFICANT CHANGE UP (ref 3.5–5.3)
POTASSIUM SERPL-SCNC: 3.4 MMOL/L — LOW (ref 3.5–5.3)
POTASSIUM SERPL-SCNC: 3.7 MMOL/L — SIGNIFICANT CHANGE UP (ref 3.5–5.3)
POTASSIUM SERPL-SCNC: 3.9 MMOL/L — SIGNIFICANT CHANGE UP (ref 3.5–5.3)
PROT SERPL-MCNC: 6.7 G/DL — SIGNIFICANT CHANGE UP (ref 6–8.3)
PROT UR-MCNC: NEGATIVE — SIGNIFICANT CHANGE UP
RBC # BLD: 4.39 M/UL — SIGNIFICANT CHANGE UP (ref 4.2–5.8)
RBC # FLD: 11.6 % — SIGNIFICANT CHANGE UP (ref 10.3–14.5)
SODIUM SERPL-SCNC: 141 MMOL/L — SIGNIFICANT CHANGE UP (ref 135–145)
SODIUM SERPL-SCNC: 141 MMOL/L — SIGNIFICANT CHANGE UP (ref 135–145)
SODIUM SERPL-SCNC: 148 MMOL/L — HIGH (ref 135–145)
SP GR SPEC: 1.02 — SIGNIFICANT CHANGE UP (ref 1.01–1.02)
UROBILINOGEN FLD QL: NEGATIVE — SIGNIFICANT CHANGE UP
WBC # BLD: 9.2 K/UL — SIGNIFICANT CHANGE UP (ref 3.8–10.5)
WBC # FLD AUTO: 9.2 K/UL — SIGNIFICANT CHANGE UP (ref 3.8–10.5)

## 2019-02-16 PROCEDURE — 99291 CRITICAL CARE FIRST HOUR: CPT

## 2019-02-16 PROCEDURE — 71045 X-RAY EXAM CHEST 1 VIEW: CPT | Mod: 26,76

## 2019-02-16 PROCEDURE — 70450 CT HEAD/BRAIN W/O DYE: CPT | Mod: 26

## 2019-02-16 PROCEDURE — 70450 CT HEAD/BRAIN W/O DYE: CPT | Mod: 26,77

## 2019-02-16 PROCEDURE — 99292 CRITICAL CARE ADDL 30 MIN: CPT

## 2019-02-16 RX ORDER — PROPOFOL 10 MG/ML
10 INJECTION, EMULSION INTRAVENOUS
Qty: 500 | Refills: 0 | Status: DISCONTINUED | OUTPATIENT
Start: 2019-02-16 | End: 2019-02-17

## 2019-02-16 RX ORDER — FAMOTIDINE 10 MG/ML
20 INJECTION INTRAVENOUS
Qty: 0 | Refills: 0 | Status: DISCONTINUED | OUTPATIENT
Start: 2019-02-16 | End: 2019-02-25

## 2019-02-16 RX ORDER — OXYCODONE HYDROCHLORIDE 5 MG/1
5 TABLET ORAL EVERY 4 HOURS
Qty: 0 | Refills: 0 | Status: DISCONTINUED | OUTPATIENT
Start: 2019-02-16 | End: 2019-02-22

## 2019-02-16 RX ORDER — PIPERACILLIN AND TAZOBACTAM 4; .5 G/20ML; G/20ML
3.38 INJECTION, POWDER, LYOPHILIZED, FOR SOLUTION INTRAVENOUS EVERY 8 HOURS
Qty: 0 | Refills: 0 | Status: DISCONTINUED | OUTPATIENT
Start: 2019-02-16 | End: 2019-02-18

## 2019-02-16 RX ORDER — PHENOBARBITAL 60 MG
60 TABLET ORAL EVERY 8 HOURS
Qty: 0 | Refills: 0 | Status: DISCONTINUED | OUTPATIENT
Start: 2019-02-16 | End: 2019-02-17

## 2019-02-16 RX ORDER — ACETAMINOPHEN 500 MG
1000 TABLET ORAL ONCE
Qty: 0 | Refills: 0 | Status: COMPLETED | OUTPATIENT
Start: 2019-02-16 | End: 2019-02-16

## 2019-02-16 RX ORDER — ACETAMINOPHEN 500 MG
650 TABLET ORAL EVERY 6 HOURS
Qty: 0 | Refills: 0 | Status: DISCONTINUED | OUTPATIENT
Start: 2019-02-16 | End: 2019-02-25

## 2019-02-16 RX ORDER — VANCOMYCIN HCL 1 G
1000 VIAL (EA) INTRAVENOUS ONCE
Qty: 0 | Refills: 0 | Status: COMPLETED | OUTPATIENT
Start: 2019-02-16 | End: 2019-02-16

## 2019-02-16 RX ORDER — VANCOMYCIN HCL 1 G
VIAL (EA) INTRAVENOUS
Qty: 0 | Refills: 0 | Status: DISCONTINUED | OUTPATIENT
Start: 2019-02-16 | End: 2019-02-17

## 2019-02-16 RX ORDER — POTASSIUM PHOSPHATE, MONOBASIC POTASSIUM PHOSPHATE, DIBASIC 236; 224 MG/ML; MG/ML
15 INJECTION, SOLUTION INTRAVENOUS ONCE
Qty: 0 | Refills: 0 | Status: COMPLETED | OUTPATIENT
Start: 2019-02-16 | End: 2019-02-16

## 2019-02-16 RX ORDER — PHENOBARBITAL 60 MG
30 TABLET ORAL EVERY 8 HOURS
Qty: 0 | Refills: 0 | Status: DISCONTINUED | OUTPATIENT
Start: 2019-02-17 | End: 2019-02-18

## 2019-02-16 RX ORDER — CHLORHEXIDINE GLUCONATE 213 G/1000ML
1 SOLUTION TOPICAL
Qty: 0 | Refills: 0 | Status: DISCONTINUED | OUTPATIENT
Start: 2019-02-16 | End: 2019-02-25

## 2019-02-16 RX ORDER — VANCOMYCIN HCL 1 G
1000 VIAL (EA) INTRAVENOUS EVERY 12 HOURS
Qty: 0 | Refills: 0 | Status: DISCONTINUED | OUTPATIENT
Start: 2019-02-17 | End: 2019-02-17

## 2019-02-16 RX ORDER — POTASSIUM CHLORIDE 20 MEQ
40 PACKET (EA) ORAL ONCE
Qty: 0 | Refills: 0 | Status: COMPLETED | OUTPATIENT
Start: 2019-02-16 | End: 2019-02-16

## 2019-02-16 RX ORDER — CALCIUM GLUCONATE 100 MG/ML
1 VIAL (ML) INTRAVENOUS ONCE
Qty: 0 | Refills: 0 | Status: COMPLETED | OUTPATIENT
Start: 2019-02-16 | End: 2019-02-16

## 2019-02-16 RX ORDER — OXYCODONE HYDROCHLORIDE 5 MG/1
10 TABLET ORAL EVERY 4 HOURS
Qty: 0 | Refills: 0 | Status: DISCONTINUED | OUTPATIENT
Start: 2019-02-16 | End: 2019-02-22

## 2019-02-16 RX ORDER — LABETALOL HCL 100 MG
300 TABLET ORAL EVERY 8 HOURS
Qty: 0 | Refills: 0 | Status: DISCONTINUED | OUTPATIENT
Start: 2019-02-16 | End: 2019-02-17

## 2019-02-16 RX ADMIN — DEXMEDETOMIDINE HYDROCHLORIDE IN 0.9% SODIUM CHLORIDE 4.51 MICROGRAM(S)/KG/HR: 4 INJECTION INTRAVENOUS at 04:57

## 2019-02-16 RX ADMIN — Medication 60 MILLIGRAM(S): at 13:03

## 2019-02-16 RX ADMIN — Medication 1 MILLIGRAM(S): at 13:03

## 2019-02-16 RX ADMIN — SODIUM CHLORIDE 2 GRAM(S): 9 INJECTION INTRAMUSCULAR; INTRAVENOUS; SUBCUTANEOUS at 13:03

## 2019-02-16 RX ADMIN — Medication 100 MILLIGRAM(S): at 13:04

## 2019-02-16 RX ADMIN — Medication 25 MILLIGRAM(S): at 22:13

## 2019-02-16 RX ADMIN — Medication 650 MILLIGRAM(S): at 15:17

## 2019-02-16 RX ADMIN — Medication 2 MILLIGRAM(S): at 14:59

## 2019-02-16 RX ADMIN — Medication 60 MILLIGRAM(S): at 23:55

## 2019-02-16 RX ADMIN — Medication 1000 MILLIGRAM(S): at 18:15

## 2019-02-16 RX ADMIN — Medication 1 TABLET(S): at 13:03

## 2019-02-16 RX ADMIN — Medication 400 MILLIGRAM(S): at 09:45

## 2019-02-16 RX ADMIN — Medication 2 MILLIGRAM(S): at 00:15

## 2019-02-16 RX ADMIN — Medication 200 MILLIGRAM(S): at 05:02

## 2019-02-16 RX ADMIN — CHLORHEXIDINE GLUCONATE 1 APPLICATION(S): 213 SOLUTION TOPICAL at 22:13

## 2019-02-16 RX ADMIN — SODIUM CHLORIDE 2 GRAM(S): 9 INJECTION INTRAMUSCULAR; INTRAVENOUS; SUBCUTANEOUS at 22:13

## 2019-02-16 RX ADMIN — Medication 250 MILLIGRAM(S): at 16:34

## 2019-02-16 RX ADMIN — Medication 40 MILLIEQUIVALENT(S): at 04:58

## 2019-02-16 RX ADMIN — SODIUM CHLORIDE 50 MILLILITER(S): 5 INJECTION, SOLUTION INTRAVENOUS at 04:57

## 2019-02-16 RX ADMIN — Medication 400 GRAM(S): at 04:58

## 2019-02-16 RX ADMIN — Medication 650 MILLIGRAM(S): at 16:17

## 2019-02-16 RX ADMIN — Medication 1000 MILLIGRAM(S): at 10:26

## 2019-02-16 RX ADMIN — Medication 2 MILLIGRAM(S): at 17:13

## 2019-02-16 RX ADMIN — SODIUM CHLORIDE 2 GRAM(S): 9 INJECTION INTRAMUSCULAR; INTRAVENOUS; SUBCUTANEOUS at 05:02

## 2019-02-16 RX ADMIN — Medication 400 MILLIGRAM(S): at 18:00

## 2019-02-16 RX ADMIN — Medication 25 MILLIGRAM(S): at 13:03

## 2019-02-16 RX ADMIN — Medication 200 MILLIGRAM(S): at 22:13

## 2019-02-16 RX ADMIN — Medication 25 MILLIGRAM(S): at 05:02

## 2019-02-16 RX ADMIN — SODIUM CHLORIDE 75 MILLILITER(S): 5 INJECTION, SOLUTION INTRAVENOUS at 22:14

## 2019-02-16 RX ADMIN — PIPERACILLIN AND TAZOBACTAM 25 GRAM(S): 4; .5 INJECTION, POWDER, LYOPHILIZED, FOR SOLUTION INTRAVENOUS at 22:12

## 2019-02-16 RX ADMIN — PROPOFOL 5.42 MICROGRAM(S)/KG/MIN: 10 INJECTION, EMULSION INTRAVENOUS at 22:14

## 2019-02-16 RX ADMIN — NICARDIPINE HYDROCHLORIDE 25 MG/HR: 30 CAPSULE, EXTENDED RELEASE ORAL at 04:57

## 2019-02-16 RX ADMIN — POTASSIUM PHOSPHATE, MONOBASIC POTASSIUM PHOSPHATE, DIBASIC 62.5 MILLIMOLE(S): 236; 224 INJECTION, SOLUTION INTRAVENOUS at 06:26

## 2019-02-16 RX ADMIN — Medication 200 MILLIGRAM(S): at 13:03

## 2019-02-16 NOTE — PROGRESS NOTE ADULT - ASSESSMENT
ASSESSMENT/PLAN:  Left BG ICH, expanded on repeat CT head, CTA no vascular malformation   conventional angiogram negative  has cytotoxic edema   most likley HTN  ICH score 1    Alcoholic - drinks 12 beers/day for several years, [facility] protocol  librium taper, wean precedex gtt as able  Stroke core measures, Stroke consult   hypertonics for Na 140-150  -160  hypoxia likely due to agitation and sedatives, repeat ABG, may need high flow nasal cannula  start tube feeding in am  repeat CTH in am, if stable start DVT ppx  monitor for fevers    VTE prophylaxis: [x] SCDs     CODE STATUS:  [x] Full Code [] DNR [] DNI [] Palliative/Comfort Care    DISPOSITION:  [X] ICU [] Stroke Unit [] Floor [] EMU [] RCU [] PCU    all family members updated at bedside extensively     Time spent: 45 [x] critical care minutes    Contact: [phone] ASSESSMENT/PLAN:  Left BG ICH, expanded on repeat CT head, CTA no vascular malformation   conventional angiogram negative  has cytotoxic edema   most likley HTN  ICH score 1    further increase heme on am CTH today, pm CTH stable    Alcoholic - drinks 12 beers/day for several years, [facility] protocol  currently on propofol, transition to precedex  phenobarb for EtOH and seizures ppx  Stroke core measures, Stroke consult   hypertonics for Na 140-150  -160  intubated  repeat CTH in am, if stable start DVT ppx  monitor for fevers  vanc/zosyn for pna, f/u cultures    VTE prophylaxis: [x] SCDs     CODE STATUS:  [x] Full Code [] DNR [] DNI [] Palliative/Comfort Care    DISPOSITION:  [X] ICU [] Stroke Unit [] Floor [] EMU [] RCU [] PCU    Time spent: 35 [x] critical care minutes    Contact: [phone]

## 2019-02-16 NOTE — PROGRESS NOTE ADULT - SUBJECTIVE AND OBJECTIVE BOX
SUMMARY:  48 year old man unknown PMH transferred from outside hospital for left ICH. He was admitted to the ICU and had worsening weakness, repeat CT head showed increase in ICH. He drinks 6 beers a day, not in the morning  Does not take any medications at home      am CTH with increase heme, conventional angiogram negative, repeat CTH in pm stable  extremely agitated, requiring ativan/haldol/precedex gtt    VITAL SIGNS:  Reviewed   IMAGING: Reviewed  MEDICATIONS:  Reviewed     Physical Exam:  EXAMINATION:  General: No acute distress  HEENT: Anicteric sclerae  Cardiac: [id number]  Lungs: Clear  Abdomen: Soft, non-tender, +BS  Extremities: No c/c/e  Skin/Incision Site: Clean, dry and intact  NEURO: sedated, eyes open spontaneously, global aphasia, mumbles some words, not following commands, moves left side with 5/5 strength spontaneously, RUE 2/5 proximally, RLE 3/5 purposeful SUMMARY:  48 year old man unknown PMH transferred from outside hospital for left ICH. He was admitted to the ICU and had worsening weakness, repeat CT head showed increase in ICH. He drinks 6 beers a day, not in the morning  Does not take any medications at home      am CTH with increase heme, conventional angiogram negative, repeat CTH in pm stable  extremely agitated, requiring ativan/haldol/precedex gtt    respiratory distress in am, requiring intubation    VITAL SIGNS:  Reviewed   IMAGING: Reviewed  MEDICATIONS:  Reviewed     EXAMINATION:  paused sedation  General: No acute distress, intubated  HEENT: Anicteric sclerae  Cardiac: [id number]  Lungs: Clear  Abdomen: Soft, non-tender, +BS  Extremities: No c/c/e  Skin/Incision Site: Clean, dry and intact  NEURO: sedated, no EO, L side spontaneous, no FC, RUE no movement, RLE WD

## 2019-02-16 NOTE — PROGRESS NOTE ADULT - SUBJECTIVE AND OBJECTIVE BOX
Pt. went to IR 2/15, no vascular malformation; overnight was going into withdrawal and was placed on ativan + Precedex ggt.  This morning in resp distress, not able to protect his airways, copious secretions.      Labs, vitals and imaging reviewed.      PHYSICAL EXAM:    General: sedated, intubated.    Neurological: prior to intubation, not EO, not FC, moving L side strongly, 0/5 in the right UE, 0/5 in the right LE . PERRL, EOMI, right facial.    Pulmonary: Clear to Auscultation, No Rales, No Rhonchi, No Wheezes     Cardiovascular: S1, S2, Regular Rate and Rhythm     Gastrointestinal: Soft, Nontender, Nondistended     Extremities: No calf tenderness     A/P:  Left BG ICH, expanded on repeat CT head, CTA no vascular malformation, has cytotoxic edema   most likley HTN  ICH score 1  EtOH withdrawal.  Acute hypoxic resp distress.    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  cont thiamine + supplements  start labetolol 300 mg q 8 hrs   Respiratory: full vent support  CV: -140 mmhg, try to wean off Nicardipine, cont labetolol 300 mg q 8 hrs   Endocrine: finger sticks q 6 hrs, IS  Renal:2% 75 ml/hr for cerebral edema, increase to 100 + salt tabs, Na goal 145-150, BMP q 6 hrs , sodium goal 140-145 ,   ID: febrile - panCx + start Zosyn  GI: NPO for stability scan   Heme/Onc: DVT ppx:  SCD  Social/Family: ICU  Discharge planning: ICU      Code Status: [x] Full Code [] DNR [] DNI [] Goals of Care:   Disposition: [x] ICU [] Stroke Unit [] RCU []PCU []Floor [] Discharge Home     Patient at high risk for neurologic deterioration, ICH expanding, seizures    critical care time, excluding procedures: 35 minutes Pt. went to IR 2/15, no vascular malformation; overnight was going into withdrawal and was placed on ativan + Precedex ggt.  This morning in resp distress, not able to protect his airways, copious secretions.      Labs, vitals and imaging reviewed.      PHYSICAL EXAM:    General: sedated, intubated.    Neurological: prior to intubation, not EO, not FC, moving L side strongly, 0/5 in the right UE, 0/5 in the right LE . PERRL, EOMI, right facial.    Pulmonary: Clear to Auscultation, No Rales, No Rhonchi, No Wheezes     Cardiovascular: S1, S2, Regular Rate and Rhythm     Gastrointestinal: Soft, Nontender, Nondistended     Extremities: No calf tenderness     A/P:  Left BG ICH, expanded on repeat CT head, CTA no vascular malformation, has cytotoxic edema   most likley HTN  ICH score 1  EtOH withdrawal.  Acute hypoxic resp distress.    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  cont thiamine + supplements  start labetolol 300 mg q 8 hrs   Respiratory: full vent support  CV: -140 mmhg, try to wean off Nicardipine, cont labetolol 300 mg q 8 hrs   Endocrine: finger sticks q 6 hrs, IS  Renal:2% 75 ml/hr for cerebral edema, increase to 100 + salt tabs, Na goal 145-150, BMP q 6 hrs , sodium goal 140-145 ,   ID: febrile - panCx + start Zosyn and Vanco - renally dosed  GI: NPO for stability scan   Heme/Onc: DVT ppx:  SCD  Social/Family: ICU  Discharge planning: ICU      Code Status: [x] Full Code [] DNR [] DNI [] Goals of Care:   Disposition: [x] ICU [] Stroke Unit [] RCU []PCU []Floor [] Discharge Home     Patient at high risk for neurologic deterioration, ICH expanding, seizures    critical care time, excluding procedures: 45 minutes Pt. went to IR 2/15, no vascular malformation; overnight was going into withdrawal and was placed on ativan + Precedex ggt.  This morning in resp distress, not able to protect his airways, copious secretions.      T(C): 36.7 (02-16-19 @ 17:00), Max: 38.4 (02-16-19 @ 08:00)  HR: 94 (02-16-19 @ 17:00) (83 - 108)  BP: 107/72 (02-15-19 @ 21:00) (107/72 - 107/72)  RR: 21 (02-16-19 @ 17:00) (14 - 48)  SpO2: 100% (02-16-19 @ 17:00) (91% - 100%)  02-15-19 @ 07:01  -  02-16-19 @ 07:00  --------------------------------------------------------  IN: 2602.9 mL / OUT: 1700 mL / NET: 902.9 mL    02-16-19 @ 07:01  -  02-16-19 @ 17:41  --------------------------------------------------------  IN: 1329.9 mL / OUT: 0 mL / NET: 1329.9 mL    acetaminophen   Tablet .. 650 milliGRAM(s) Oral every 6 hours PRN  chlordiazePOXIDE 25 milliGRAM(s) Oral every 8 hours  dexmedetomidine Infusion 0.2 MICROgram(s)/kG/Hr IV Continuous <Continuous>  folic acid 1 milliGRAM(s) Enteral Tube daily  haloperidol    Injectable 2.5 milliGRAM(s) IV Push once  labetalol 200 milliGRAM(s) Enteral Tube every 8 hours  LORazepam   Injectable 2 milliGRAM(s) IntraMuscular every 2 hours PRN  multivitamin 1 Tablet(s) Oral daily  niCARdipine Infusion 5 mG/Hr IV Continuous <Continuous>  PHENobarbital Elixir 60 milliGRAM(s) Oral every 8 hours  piperacillin/tazobactam IVPB. 3.375 Gram(s) IV Intermittent every 8 hours  propofol Infusion 10 MICROgram(s)/kG/Min IV Continuous <Continuous>  sodium chloride 2 Gram(s) Oral every 8 hours  sodium chloride 2% . 1000 milliLiter(s) IV Continuous <Continuous>  thiamine 100 milliGRAM(s) Oral daily  vancomycin  IVPB      Mode: AC/ CMV (Assist Control/ Continuous Mandatory Ventilation), RR (machine): 14, TV (machine): 550, FiO2: 100, PEEP: 8, ITime: 1, MAP: 13, PIP: 26    PHYSICAL EXAM:    General: sedated, intubated.    Neurological: prior to intubation, not EO, not FC, moving L side strongly, 0/5 in the right UE, 0/5 in the right LE . PERRL, EOMI, right facial.    Pulmonary: Clear to Auscultation, No Rales, No Rhonchi, No Wheezes     Cardiovascular: S1, S2, Regular Rate and Rhythm     Gastrointestinal: Soft, Nontender, Nondistended     Extremities: No calf tenderness     A/P:  Left BG ICH, expanded on repeat CT head, CTA no vascular malformation, has cytotoxic edema   most likley HTN  ICH score 1  EtOH withdrawal.  Acute hypoxic resp distress.    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  cont thiamine + supplements  start labetolol 300 mg q 8 hrs   Respiratory: full vent support  CV: -140 mmhg, try to wean off Nicardipine, cont labetolol 300 mg q 8 hrs   Endocrine: finger sticks q 6 hrs, IS  Renal:2% 75 ml/hr for cerebral edema, increase to 100 + salt tabs, Na goal 145-150, BMP q 6 hrs , sodium goal 140-145 ,   ID: febrile - panCx + start Zosyn and Vanco - renally dosed  GI: NPO for stability scan   Heme/Onc: DVT ppx:  SCD  Social/Family: ICU  Discharge planning: ICU      Code Status: [x] Full Code [] DNR [] DNI [] Goals of Care:   Disposition: [x] ICU [] Stroke Unit [] RCU []PCU []Floor [] Discharge Home     Patient at high risk for neurologic deterioration, ICH expanding, seizures    critical care time, excluding procedures: 45 minutes Pt. went to IR 2/15, no vascular malformation; overnight was going into withdrawal and was placed on ativan + Precedex ggt.  This morning in resp distress, not able to protect his airways, copious secretions.      T(C): 36.7 (02-16-19 @ 17:00), Max: 38.4 (02-16-19 @ 08:00)  HR: 94 (02-16-19 @ 17:00) (83 - 108)  BP: 107/72 (02-15-19 @ 21:00) (107/72 - 107/72)  RR: 21 (02-16-19 @ 17:00) (14 - 48)  SpO2: 100% (02-16-19 @ 17:00) (91% - 100%)  02-15-19 @ 07:01  -  02-16-19 @ 07:00  --------------------------------------------------------  IN: 2602.9 mL / OUT: 1700 mL / NET: 902.9 mL    02-16-19 @ 07:01  -  02-16-19 @ 17:41  --------------------------------------------------------  IN: 1329.9 mL / OUT: 0 mL / NET: 1329.9 mL    acetaminophen   Tablet .. 650 milliGRAM(s) Oral every 6 hours PRN  chlordiazePOXIDE 25 milliGRAM(s) Oral every 8 hours  dexmedetomidine Infusion 0.2 MICROgram(s)/kG/Hr IV Continuous <Continuous>  folic acid 1 milliGRAM(s) Enteral Tube daily  haloperidol    Injectable 2.5 milliGRAM(s) IV Push once  labetalol 200 milliGRAM(s) Enteral Tube every 8 hours  LORazepam   Injectable 2 milliGRAM(s) IntraMuscular every 2 hours PRN  multivitamin 1 Tablet(s) Oral daily  niCARdipine Infusion 5 mG/Hr IV Continuous <Continuous>  PHENobarbital Elixir 60 milliGRAM(s) Oral every 8 hours  piperacillin/tazobactam IVPB. 3.375 Gram(s) IV Intermittent every 8 hours  propofol Infusion 10 MICROgram(s)/kG/Min IV Continuous <Continuous>  sodium chloride 2 Gram(s) Oral every 8 hours  sodium chloride 2% . 1000 milliLiter(s) IV Continuous <Continuous>  thiamine 100 milliGRAM(s) Oral daily  vancomycin  IVPB      Mode: AC/ CMV (Assist Control/ Continuous Mandatory Ventilation), RR (machine): 14, TV (machine): 550, FiO2: 100, PEEP: 8, ITime: 1, MAP: 13, PIP: 26    PHYSICAL EXAM:    General: sedated, intubated.    Neurological: prior to intubation, not EO, not FC, moving L side strongly, 0/5 in the right UE, 0/5 in the right LE . PERRL, EOMI, right facial.    Pulmonary: Clear to Auscultation, No Rales, No Rhonchi, No Wheezes     Cardiovascular: S1, S2, Regular Rate and Rhythm     Gastrointestinal: Soft, Nontender, Nondistended     Extremities: No calf tenderness       LABS:  Na: 141 (02-16 @ 09:04), 141 (02-16 @ 03:38), 140 (02-15 @ 17:38), 147 (02-15 @ 16:38), 136 (02-15 @ 09:21), 139 (02-15 @ 01:17), 139 (02-14 @ 19:27), 138 (02-14 @ 10:09)  K: 3.9 (02-16 @ 09:04), 3.4 (02-16 @ 03:38), 3.7 (02-15 @ 17:38), 4.8 (02-15 @ 16:38), 3.7 (02-15 @ 09:21), 3.7 (02-15 @ 01:17), 3.7 (02-14 @ 19:27), 3.9 (02-14 @ 10:09)  Cl: 108 (02-16 @ 09:04), 107 (02-16 @ 03:38), 107 (02-15 @ 17:38), 114 (02-15 @ 16:38), 101 (02-15 @ 09:21), 102 (02-15 @ 01:17), 99 (02-14 @ 19:27), 99 (02-14 @ 10:09)  CO2: 20 (02-16 @ 09:04), 20 (02-16 @ 03:38), 20 (02-15 @ 17:38), 21 (02-15 @ 16:38), 22 (02-15 @ 09:21), 22 (02-15 @ 01:17), 22 (02-14 @ 19:27), 27.0 (02-14 @ 10:09)  BUN: 8 (02-16 @ 09:04), 7 (02-16 @ 03:38), 7 (02-15 @ 17:38), 86 (02-15 @ 16:38), 8 (02-15 @ 09:21), 8 (02-15 @ 01:17), 8 (02-14 @ 19:27), 8.0 (02-14 @ 10:09)  Cr: 0.75 (02-16 @ 09:04), 0.71 (02-16 @ 03:38), 0.63 (02-15 @ 17:38), 1.72 (02-15 @ 16:38), 0.67 (02-15 @ 09:21), 0.63 (02-15 @ 01:17), 0.60 (02-14 @ 19:27), 0.71 (02-14 @ 10:09)  Glu: 200(02-16 @ 09:04), 169(02-16 @ 03:38), 173(02-15 @ 17:38), 187(02-15 @ 16:38), 186(02-15 @ 09:21), 139(02-15 @ 01:17), 147(02-14 @ 19:27), 184(02-14 @ 10:09)    Hgb: 14.1 (02-16 @ 03:38), 13.8 (02-15 @ 17:38), 14.4 (02-15 @ 01:17), 14.8 (02-14 @ 10:09)  Hct: 39.9 (02-16 @ 03:38), 38.9 (02-15 @ 17:38), 39.6 (02-15 @ 01:17), 42.7 (02-14 @ 10:09)  WBC: 9.2 (02-16 @ 03:38), 8.8 (02-15 @ 17:38), 8.6 (02-15 @ 01:17), 4.2 (02-14 @ 10:09)  Plt: 153 (02-16 @ 03:38), 149 (02-15 @ 17:38), 173 (02-15 @ 01:17), 150 (02-14 @ 10:09)    INR: 1.00 02-15-19 @ 01:17, 0.92 02-14-19 @ 10:09  PTT: 27.9 02-15-19 @ 01:17, 26.4 02-14-19 @ 10:09    EXAM:  CT BRAIN                            PROCEDURE DATE:  02/16/2019            INTERPRETATION:  HISTORY: Stroke. Follow-up intracranial hemorrhage.    Technique: CT of the head was performed without intravenous contrast.    Multiple contiguous axial images were acquired from the skullbase to the   vertex without the administration of intravenous contrast.  Coronal and   sagittal reformations were made.    COMPARISON: Prior head CT dated 2/15/2019.    FINDINGS:    There is been slight interval enlargement of the previously seen left   basal ganglia parenchymal hemorrhage, currently 4.0 x 3.6 x 3.8 cm,   previously 3.7 x 3.5 x 3.8 cm. Rightward midline shift of 5 mm is   minimally increased from 4 mm. No effacement of the basal cisterns.   Ventricle stable in size. No large hydrocephalus.    IMPRESSION:    Slight interval enlargement of previously seen left basal ganglia   parenchymal hemorrhage.    Minimally increased rightward midline shift.    Ventricle stable in size. No large hydrocephalus.      A/P:  Left BG ICH, expanded on repeat CT head, CTA no vascular malformation, has cytotoxic edema   most likley HTN  ICH score 1  EtOH withdrawal.  Acute hypoxic resp distress.    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  cont thiamine + supplements  start labetolol 300 mg q 8 hrs   Respiratory: likely from sedation for alcohol withdrawal, aspiration pneumonia full vent support  CV: -140 mmhg, try to wean off Nicardipine, cont labetolol 300 mg q 8 hrs   Endocrine: finger sticks q 6 hrs, IS  Renal:2% 75 ml/hr for cerebral edema, increase to 100 + salt tabs, Na goal 145-150, BMP q 6 hrs , sodium goal 140-145 ,   ID: febrile - panCx + start Zosyn and Vanco - renally dosed  GI: NPO for stability scan   Heme/Onc: DVT ppx:  SCD  Social/Family: ICU  Discharge planning: ICU      Code Status: [x] Full Code [] DNR [] DNI [] Goals of Care:   Disposition: [x] ICU [] Stroke Unit [] RCU []PCU []Floor [] Discharge Home     Patient at high risk for neurologic deterioration, ICH expanding, seizures    critical care time, excluding procedures: 45 minutes

## 2019-02-16 NOTE — PROGRESS NOTE ADULT - SUBJECTIVE AND OBJECTIVE BOX
Called on STAT pager for emergency intubation.  On arrival, patient somnolent, unable to clear secretions.  History briefly reviewed with housestaff.  NPO status verified.  Preoxygenated, 200mg propofol dosed, mask ventilation confirmed, 50mg rocuronium dosed, DL x 1 with MAC 4 blade, grade 2 view, 7.5 cuffed ETT passed without trauma, + ETCO2 via EasyCap, + b/l BS, taped at 24cm at upper incisors, teeth intact, no complications.  VSS throughout.  CXR pending.

## 2019-02-17 LAB
-  COAGULASE NEGATIVE STAPHYLOCOCCUS: SIGNIFICANT CHANGE UP
ANION GAP SERPL CALC-SCNC: 11 MMOL/L — SIGNIFICANT CHANGE UP (ref 5–17)
ANION GAP SERPL CALC-SCNC: 11 MMOL/L — SIGNIFICANT CHANGE UP (ref 5–17)
BUN SERPL-MCNC: 5 MG/DL — LOW (ref 7–23)
BUN SERPL-MCNC: 7 MG/DL — SIGNIFICANT CHANGE UP (ref 7–23)
CALCIUM SERPL-MCNC: 8.5 MG/DL — SIGNIFICANT CHANGE UP (ref 8.4–10.5)
CALCIUM SERPL-MCNC: 8.7 MG/DL — SIGNIFICANT CHANGE UP (ref 8.4–10.5)
CHLORIDE SERPL-SCNC: 114 MMOL/L — HIGH (ref 96–108)
CHLORIDE SERPL-SCNC: 117 MMOL/L — HIGH (ref 96–108)
CO2 SERPL-SCNC: 19 MMOL/L — LOW (ref 22–31)
CO2 SERPL-SCNC: 21 MMOL/L — LOW (ref 22–31)
CREAT SERPL-MCNC: 0.72 MG/DL — SIGNIFICANT CHANGE UP (ref 0.5–1.3)
CREAT SERPL-MCNC: 0.77 MG/DL — SIGNIFICANT CHANGE UP (ref 0.5–1.3)
GAS PNL BLDA: SIGNIFICANT CHANGE UP
GLUCOSE SERPL-MCNC: 179 MG/DL — HIGH (ref 70–99)
GLUCOSE SERPL-MCNC: 216 MG/DL — HIGH (ref 70–99)
GRAM STN FLD: SIGNIFICANT CHANGE UP
MAGNESIUM SERPL-MCNC: 2 MG/DL — SIGNIFICANT CHANGE UP (ref 1.6–2.6)
MAGNESIUM SERPL-MCNC: 2.1 MG/DL — SIGNIFICANT CHANGE UP (ref 1.6–2.6)
METHOD TYPE: SIGNIFICANT CHANGE UP
PHOSPHATE SERPL-MCNC: 1.5 MG/DL — LOW (ref 2.5–4.5)
PHOSPHATE SERPL-MCNC: 2.4 MG/DL — LOW (ref 2.5–4.5)
POTASSIUM SERPL-MCNC: 3.6 MMOL/L — SIGNIFICANT CHANGE UP (ref 3.5–5.3)
POTASSIUM SERPL-MCNC: 3.8 MMOL/L — SIGNIFICANT CHANGE UP (ref 3.5–5.3)
POTASSIUM SERPL-SCNC: 3.6 MMOL/L — SIGNIFICANT CHANGE UP (ref 3.5–5.3)
POTASSIUM SERPL-SCNC: 3.8 MMOL/L — SIGNIFICANT CHANGE UP (ref 3.5–5.3)
SODIUM SERPL-SCNC: 146 MMOL/L — HIGH (ref 135–145)
SODIUM SERPL-SCNC: 147 MMOL/L — HIGH (ref 135–145)
SPECIMEN SOURCE: SIGNIFICANT CHANGE UP

## 2019-02-17 PROCEDURE — 70450 CT HEAD/BRAIN W/O DYE: CPT | Mod: 26

## 2019-02-17 PROCEDURE — 70553 MRI BRAIN STEM W/O & W/DYE: CPT | Mod: 26

## 2019-02-17 PROCEDURE — 99291 CRITICAL CARE FIRST HOUR: CPT

## 2019-02-17 PROCEDURE — 71045 X-RAY EXAM CHEST 1 VIEW: CPT | Mod: 26

## 2019-02-17 RX ORDER — NICARDIPINE HYDROCHLORIDE 30 MG/1
5 CAPSULE, EXTENDED RELEASE ORAL
Qty: 40 | Refills: 0 | Status: DISCONTINUED | OUTPATIENT
Start: 2019-02-17 | End: 2019-02-19

## 2019-02-17 RX ORDER — INSULIN LISPRO 100/ML
VIAL (ML) SUBCUTANEOUS EVERY 6 HOURS
Qty: 0 | Refills: 0 | Status: DISCONTINUED | OUTPATIENT
Start: 2019-02-17 | End: 2019-02-18

## 2019-02-17 RX ORDER — VANCOMYCIN HCL 1 G
1000 VIAL (EA) INTRAVENOUS EVERY 12 HOURS
Qty: 0 | Refills: 0 | Status: DISCONTINUED | OUTPATIENT
Start: 2019-02-17 | End: 2019-02-18

## 2019-02-17 RX ORDER — LABETALOL HCL 100 MG
100 TABLET ORAL ONCE
Qty: 0 | Refills: 0 | Status: COMPLETED | OUTPATIENT
Start: 2019-02-17 | End: 2019-02-17

## 2019-02-17 RX ORDER — ACETAMINOPHEN 500 MG
1000 TABLET ORAL ONCE
Qty: 0 | Refills: 0 | Status: COMPLETED | OUTPATIENT
Start: 2019-02-17 | End: 2019-02-17

## 2019-02-17 RX ORDER — CALCIUM GLUCONATE 100 MG/ML
2 VIAL (ML) INTRAVENOUS ONCE
Qty: 0 | Refills: 0 | Status: COMPLETED | OUTPATIENT
Start: 2019-02-17 | End: 2019-02-17

## 2019-02-17 RX ORDER — POTASSIUM CHLORIDE 20 MEQ
40 PACKET (EA) ORAL ONCE
Qty: 0 | Refills: 0 | Status: COMPLETED | OUTPATIENT
Start: 2019-02-17 | End: 2019-02-17

## 2019-02-17 RX ORDER — LABETALOL HCL 100 MG
400 TABLET ORAL EVERY 8 HOURS
Qty: 0 | Refills: 0 | Status: DISCONTINUED | OUTPATIENT
Start: 2019-02-17 | End: 2019-02-22

## 2019-02-17 RX ADMIN — Medication 400 MILLIGRAM(S): at 13:45

## 2019-02-17 RX ADMIN — Medication 100 MILLIGRAM(S): at 00:14

## 2019-02-17 RX ADMIN — Medication 60 MILLIGRAM(S): at 05:16

## 2019-02-17 RX ADMIN — Medication 250 MILLIGRAM(S): at 17:38

## 2019-02-17 RX ADMIN — NICARDIPINE HYDROCHLORIDE 25 MG/HR: 30 CAPSULE, EXTENDED RELEASE ORAL at 11:45

## 2019-02-17 RX ADMIN — Medication 30 MILLIGRAM(S): at 21:31

## 2019-02-17 RX ADMIN — SODIUM CHLORIDE 75 MILLILITER(S): 5 INJECTION, SOLUTION INTRAVENOUS at 11:45

## 2019-02-17 RX ADMIN — Medication 400 MILLIGRAM(S): at 21:31

## 2019-02-17 RX ADMIN — Medication 25 MILLIGRAM(S): at 13:14

## 2019-02-17 RX ADMIN — Medication 400 MILLIGRAM(S): at 13:14

## 2019-02-17 RX ADMIN — FAMOTIDINE 20 MILLIGRAM(S): 10 INJECTION INTRAVENOUS at 17:38

## 2019-02-17 RX ADMIN — SODIUM CHLORIDE 2 GRAM(S): 9 INJECTION INTRAMUSCULAR; INTRAVENOUS; SUBCUTANEOUS at 05:15

## 2019-02-17 RX ADMIN — PIPERACILLIN AND TAZOBACTAM 25 GRAM(S): 4; .5 INJECTION, POWDER, LYOPHILIZED, FOR SOLUTION INTRAVENOUS at 21:31

## 2019-02-17 RX ADMIN — DEXMEDETOMIDINE HYDROCHLORIDE IN 0.9% SODIUM CHLORIDE 4.51 MICROGRAM(S)/KG/HR: 4 INJECTION INTRAVENOUS at 19:00

## 2019-02-17 RX ADMIN — DEXMEDETOMIDINE HYDROCHLORIDE IN 0.9% SODIUM CHLORIDE 4.51 MICROGRAM(S)/KG/HR: 4 INJECTION INTRAVENOUS at 00:14

## 2019-02-17 RX ADMIN — Medication 300 MILLIGRAM(S): at 05:16

## 2019-02-17 RX ADMIN — Medication 25 MILLIGRAM(S): at 05:16

## 2019-02-17 RX ADMIN — NICARDIPINE HYDROCHLORIDE 25 MG/HR: 30 CAPSULE, EXTENDED RELEASE ORAL at 19:00

## 2019-02-17 RX ADMIN — PIPERACILLIN AND TAZOBACTAM 25 GRAM(S): 4; .5 INJECTION, POWDER, LYOPHILIZED, FOR SOLUTION INTRAVENOUS at 05:16

## 2019-02-17 RX ADMIN — CHLORHEXIDINE GLUCONATE 1 APPLICATION(S): 213 SOLUTION TOPICAL at 21:31

## 2019-02-17 RX ADMIN — Medication 2 MILLIGRAM(S): at 14:20

## 2019-02-17 RX ADMIN — Medication 1 MILLIGRAM(S): at 11:44

## 2019-02-17 RX ADMIN — Medication 1000 MILLIGRAM(S): at 14:00

## 2019-02-17 RX ADMIN — Medication 100 MILLIGRAM(S): at 11:44

## 2019-02-17 RX ADMIN — Medication 250 MILLIGRAM(S): at 05:15

## 2019-02-17 RX ADMIN — FAMOTIDINE 20 MILLIGRAM(S): 10 INJECTION INTRAVENOUS at 05:38

## 2019-02-17 RX ADMIN — DEXMEDETOMIDINE HYDROCHLORIDE IN 0.9% SODIUM CHLORIDE 4.51 MICROGRAM(S)/KG/HR: 4 INJECTION INTRAVENOUS at 11:45

## 2019-02-17 RX ADMIN — SODIUM CHLORIDE 75 MILLILITER(S): 5 INJECTION, SOLUTION INTRAVENOUS at 19:00

## 2019-02-17 RX ADMIN — Medication 1 TABLET(S): at 11:44

## 2019-02-17 RX ADMIN — Medication 40 MILLIEQUIVALENT(S): at 00:52

## 2019-02-17 RX ADMIN — PIPERACILLIN AND TAZOBACTAM 25 GRAM(S): 4; .5 INJECTION, POWDER, LYOPHILIZED, FOR SOLUTION INTRAVENOUS at 13:14

## 2019-02-17 RX ADMIN — Medication 200 GRAM(S): at 02:27

## 2019-02-17 RX ADMIN — SODIUM CHLORIDE 2 GRAM(S): 9 INJECTION INTRAMUSCULAR; INTRAVENOUS; SUBCUTANEOUS at 21:30

## 2019-02-17 RX ADMIN — Medication 30 MILLIGRAM(S): at 13:43

## 2019-02-17 RX ADMIN — SODIUM CHLORIDE 2 GRAM(S): 9 INJECTION INTRAMUSCULAR; INTRAVENOUS; SUBCUTANEOUS at 13:14

## 2019-02-17 NOTE — PROGRESS NOTE ADULT - SUBJECTIVE AND OBJECTIVE BOX
O: fever, alcohol withdrawal     T(C): 36.6 (02-17-19 @ 07:00), Max: 38.4 (02-16-19 @ 15:00)  HR: 84 (02-17-19 @ 08:45) (83 - 101)  BP: --  RR: 16 (02-17-19 @ 08:00) (15 - 23)  SpO2: 100% (02-17-19 @ 08:45) (95% - 100%)  02-16-19 @ 07:01  -  02-17-19 @ 07:00  --------------------------------------------------------  IN: 3879.3 mL / OUT: 1750 mL / NET: 2129.3 mL    02-17-19 @ 07:01  -  02-17-19 @ 08:56  --------------------------------------------------------  IN: 168.5 mL / OUT: 200 mL / NET: -31.5 mL    acetaminophen   Tablet .. 650 milliGRAM(s) Oral every 6 hours PRN  chlordiazePOXIDE 25 milliGRAM(s) Oral every 8 hours  chlorhexidine 4% Liquid 1 Application(s) Topical <User Schedule>  dexmedetomidine Infusion 0.2 MICROgram(s)/kG/Hr IV Continuous <Continuous>  famotidine    Tablet 20 milliGRAM(s) Oral two times a day  folic acid 1 milliGRAM(s) Enteral Tube daily  haloperidol    Injectable 2.5 milliGRAM(s) IV Push once  labetalol 300 milliGRAM(s) Enteral Tube every 8 hours  LORazepam   Injectable 2 milliGRAM(s) IntraMuscular every 2 hours PRN  multivitamin 1 Tablet(s) Oral daily  niCARdipine Infusion 5 mG/Hr IV Continuous <Continuous>  oxyCODONE    IR 5 milliGRAM(s) Oral every 4 hours PRN  oxyCODONE    IR 10 milliGRAM(s) Oral every 4 hours PRN  PHENobarbital Elixir 30 milliGRAM(s) Oral every 8 hours  piperacillin/tazobactam IVPB. 3.375 Gram(s) IV Intermittent every 8 hours  propofol Infusion 10 MICROgram(s)/kG/Min IV Continuous <Continuous>  sodium chloride 2 Gram(s) Oral every 8 hours  sodium chloride 2% . 1000 milliLiter(s) IV Continuous <Continuous>  thiamine 100 milliGRAM(s) Oral daily  vancomycin  IVPB 1000 milliGRAM(s) IV Intermittent every 12 hours  vancomycin  IVPB      Mode: AC/ CMV (Assist Control/ Continuous Mandatory Ventilation), RR (machine): 14, TV (machine): 550, FiO2: 60, PEEP: 8, ITime: 1, MAP: 11, PIP: 22      ode: AC/ CMV (Assist Control/ Continuous Mandatory Ventilation), RR (machine): 14, TV (machine): 550, FiO2: 100, PEEP: 8, ITime: 1, MAP: 13, PIP: 26    PHYSICAL EXAM:    General: sedated, intubated.    Neurological:  intubation, not EO, not FC, moving L side strongly, 0/5 in the right UE, 0/5 in the right LE . PERRL, EOMI, right facial.    Pulmonary: Clear to Auscultation, No Rales, No Rhonchi, No Wheezes     Cardiovascular: S1, S2, Regular Rate and Rhythm     Gastrointestinal: Soft, Nontender, Nondistended     Extremities: No calf tenderness       LABS:  Na: 148 (02-16 @ 21:13), 141 (02-16 @ 09:04), 141 (02-16 @ 03:38), 140 (02-15 @ 17:38), 147 (02-15 @ 16:38), 136 (02-15 @ 09:21), 139 (02-15 @ 01:17), 139 (02-14 @ 19:27), 138 (02-14 @ 10:09)  K: 3.7 (02-16 @ 21:13), 3.9 (02-16 @ 09:04), 3.4 (02-16 @ 03:38), 3.7 (02-15 @ 17:38), 4.8 (02-15 @ 16:38), 3.7 (02-15 @ 09:21), 3.7 (02-15 @ 01:17), 3.7 (02-14 @ 19:27), 3.9 (02-14 @ 10:09)  Cl: 118 (02-16 @ 21:13), 108 (02-16 @ 09:04), 107 (02-16 @ 03:38), 107 (02-15 @ 17:38), 114 (02-15 @ 16:38), 101 (02-15 @ 09:21), 102 (02-15 @ 01:17), 99 (02-14 @ 19:27), 99 (02-14 @ 10:09)  CO2: 19 (02-16 @ 21:13), 20 (02-16 @ 09:04), 20 (02-16 @ 03:38), 20 (02-15 @ 17:38), 21 (02-15 @ 16:38), 22 (02-15 @ 09:21), 22 (02-15 @ 01:17), 22 (02-14 @ 19:27), 27.0 (02-14 @ 10:09)  BUN: 10 (02-16 @ 21:13), 8 (02-16 @ 09:04), 7 (02-16 @ 03:38), 7 (02-15 @ 17:38), 86 (02-15 @ 16:38), 8 (02-15 @ 09:21), 8 (02-15 @ 01:17), 8 (02-14 @ 19:27), 8.0 (02-14 @ 10:09)  Cr: 0.92 (02-16 @ 21:13), 0.75 (02-16 @ 09:04), 0.71 (02-16 @ 03:38), 0.63 (02-15 @ 17:38), 1.72 (02-15 @ 16:38), 0.67 (02-15 @ 09:21), 0.63 (02-15 @ 01:17), 0.60 (02-14 @ 19:27), 0.71 (02-14 @ 10:09)  Glu: 136(02-16 @ 21:13), 200(02-16 @ 09:04), 169(02-16 @ 03:38), 173(02-15 @ 17:38), 187(02-15 @ 16:38), 186(02-15 @ 09:21), 139(02-15 @ 01:17), 147(02-14 @ 19:27), 184(02-14 @ 10:09)    Hgb: 14.1 (02-16 @ 03:38), 13.8 (02-15 @ 17:38), 14.4 (02-15 @ 01:17), 14.8 (02-14 @ 10:09)  Hct: 39.9 (02-16 @ 03:38), 38.9 (02-15 @ 17:38), 39.6 (02-15 @ 01:17), 42.7 (02-14 @ 10:09)  WBC: 9.2 (02-16 @ 03:38), 8.8 (02-15 @ 17:38), 8.6 (02-15 @ 01:17), 4.2 (02-14 @ 10:09)  Plt: 153 (02-16 @ 03:38), 149 (02-15 @ 17:38), 173 (02-15 @ 01:17), 150 (02-14 @ 10:09)    INR: 1.00 02-15-19 @ 01:17, 0.92 02-14-19 @ 10:09  PTT: 27.9 02-15-19 @ 01:17, 26.4 02-14-19 @ 10:09            A/P:  Left BG ICH, expanded on repeat CT head, CTA no vascular malformation, has cytotoxic edema   most likley HTN  ICH score 1  EtOH withdrawal.  Acute hypoxic resp distress.    Neuro:   ICH expanded, repeat head CT shows a volume 37 cc, stable   neuro checks q 1 hr,   alcoholic, start on precedex and phenobarbital   cont thiamine + supplements  start labetolol 300 mg q 8 hrs   CT head tomorrow   MRI brain wwo to r/o mass   Respiratory failure, intubated, likely from sedation for alcohol withdrawal, aspiration pneumonia full vent support  PS   CV: -140 mmhg, try to wean off Nicardipine, increase labetolol 400 mg q 8 hrs   Endocrine: finger sticks q 6 hrs, IS  Renal:2% 75 ml/hr for cerebral edema,  salt tabs, Na goal 145-150, BMP q 6 hrs , sodium goal 140-145 ,   ID: febrile - panCx , cx negative so, Zosyn and Vanco - renally dosed   GI: NPO for stability scan /  , if cx remain negative, will d/c tomorrow Heme/Onc: DVT ppx:  SCD, keep lovenox on hold given that he expanded yesterday, if CT head tomorrow stable   Social/Family: ICU  Discharge planning: ICU      Code Status: [x] Full Code [] DNR [] DNI [] Goals of Care:   Disposition: [x] ICU [] Stroke Unit [] RCU []PCU []Floor [] Discharge Home     Patient at high risk for neurologic deterioration, ICH expanding, seizures    critical care time, excluding procedures: 4 minutes O: fever, alcohol withdrawal     T(C): 36.6 (02-17-19 @ 07:00), Max: 38.4 (02-16-19 @ 15:00)  HR: 84 (02-17-19 @ 08:45) (83 - 101)  BP: --  RR: 16 (02-17-19 @ 08:00) (15 - 23)  SpO2: 100% (02-17-19 @ 08:45) (95% - 100%)  02-16-19 @ 07:01  -  02-17-19 @ 07:00  --------------------------------------------------------  IN: 3879.3 mL / OUT: 1750 mL / NET: 2129.3 mL    02-17-19 @ 07:01  -  02-17-19 @ 08:56  --------------------------------------------------------  IN: 168.5 mL / OUT: 200 mL / NET: -31.5 mL    acetaminophen   Tablet .. 650 milliGRAM(s) Oral every 6 hours PRN  chlordiazePOXIDE 25 milliGRAM(s) Oral every 8 hours  chlorhexidine 4% Liquid 1 Application(s) Topical <User Schedule>  dexmedetomidine Infusion 0.2 MICROgram(s)/kG/Hr IV Continuous <Continuous>  famotidine    Tablet 20 milliGRAM(s) Oral two times a day  folic acid 1 milliGRAM(s) Enteral Tube daily  haloperidol    Injectable 2.5 milliGRAM(s) IV Push once  labetalol 300 milliGRAM(s) Enteral Tube every 8 hours  LORazepam   Injectable 2 milliGRAM(s) IntraMuscular every 2 hours PRN  multivitamin 1 Tablet(s) Oral daily  niCARdipine Infusion 5 mG/Hr IV Continuous <Continuous>  oxyCODONE    IR 5 milliGRAM(s) Oral every 4 hours PRN  oxyCODONE    IR 10 milliGRAM(s) Oral every 4 hours PRN  PHENobarbital Elixir 30 milliGRAM(s) Oral every 8 hours  piperacillin/tazobactam IVPB. 3.375 Gram(s) IV Intermittent every 8 hours  propofol Infusion 10 MICROgram(s)/kG/Min IV Continuous <Continuous>  sodium chloride 2 Gram(s) Oral every 8 hours  sodium chloride 2% . 1000 milliLiter(s) IV Continuous <Continuous>  thiamine 100 milliGRAM(s) Oral daily  vancomycin  IVPB 1000 milliGRAM(s) IV Intermittent every 12 hours  vancomycin  IVPB      Mode: AC/ CMV (Assist Control/ Continuous Mandatory Ventilation), RR (machine): 14, TV (machine): 550, FiO2: 60, PEEP: 8, ITime: 1, MAP: 11, PIP: 22      ode: AC/ CMV (Assist Control/ Continuous Mandatory Ventilation), RR (machine): 14, TV (machine): 550, FiO2: 100, PEEP: 8, ITime: 1, MAP: 13, PIP: 26    PHYSICAL EXAM:    General: sedated, intubated.    Neurological:  intubation, not EO, not FC, moving L side strongly, 0/5 in the right UE, 0/5 in the right LE . PERRL, EOMI, right facial.    Pulmonary: Clear to Auscultation, No Rales, No Rhonchi, No Wheezes     Cardiovascular: S1, S2, Regular Rate and Rhythm     Gastrointestinal: Soft, Nontender, Nondistended     Extremities: No calf tenderness       LABS:  Na: 148 (02-16 @ 21:13), 141 (02-16 @ 09:04), 141 (02-16 @ 03:38), 140 (02-15 @ 17:38), 147 (02-15 @ 16:38), 136 (02-15 @ 09:21), 139 (02-15 @ 01:17), 139 (02-14 @ 19:27), 138 (02-14 @ 10:09)  K: 3.7 (02-16 @ 21:13), 3.9 (02-16 @ 09:04), 3.4 (02-16 @ 03:38), 3.7 (02-15 @ 17:38), 4.8 (02-15 @ 16:38), 3.7 (02-15 @ 09:21), 3.7 (02-15 @ 01:17), 3.7 (02-14 @ 19:27), 3.9 (02-14 @ 10:09)  Cl: 118 (02-16 @ 21:13), 108 (02-16 @ 09:04), 107 (02-16 @ 03:38), 107 (02-15 @ 17:38), 114 (02-15 @ 16:38), 101 (02-15 @ 09:21), 102 (02-15 @ 01:17), 99 (02-14 @ 19:27), 99 (02-14 @ 10:09)  CO2: 19 (02-16 @ 21:13), 20 (02-16 @ 09:04), 20 (02-16 @ 03:38), 20 (02-15 @ 17:38), 21 (02-15 @ 16:38), 22 (02-15 @ 09:21), 22 (02-15 @ 01:17), 22 (02-14 @ 19:27), 27.0 (02-14 @ 10:09)  BUN: 10 (02-16 @ 21:13), 8 (02-16 @ 09:04), 7 (02-16 @ 03:38), 7 (02-15 @ 17:38), 86 (02-15 @ 16:38), 8 (02-15 @ 09:21), 8 (02-15 @ 01:17), 8 (02-14 @ 19:27), 8.0 (02-14 @ 10:09)  Cr: 0.92 (02-16 @ 21:13), 0.75 (02-16 @ 09:04), 0.71 (02-16 @ 03:38), 0.63 (02-15 @ 17:38), 1.72 (02-15 @ 16:38), 0.67 (02-15 @ 09:21), 0.63 (02-15 @ 01:17), 0.60 (02-14 @ 19:27), 0.71 (02-14 @ 10:09)  Glu: 136(02-16 @ 21:13), 200(02-16 @ 09:04), 169(02-16 @ 03:38), 173(02-15 @ 17:38), 187(02-15 @ 16:38), 186(02-15 @ 09:21), 139(02-15 @ 01:17), 147(02-14 @ 19:27), 184(02-14 @ 10:09)    Hgb: 14.1 (02-16 @ 03:38), 13.8 (02-15 @ 17:38), 14.4 (02-15 @ 01:17), 14.8 (02-14 @ 10:09)  Hct: 39.9 (02-16 @ 03:38), 38.9 (02-15 @ 17:38), 39.6 (02-15 @ 01:17), 42.7 (02-14 @ 10:09)  WBC: 9.2 (02-16 @ 03:38), 8.8 (02-15 @ 17:38), 8.6 (02-15 @ 01:17), 4.2 (02-14 @ 10:09)  Plt: 153 (02-16 @ 03:38), 149 (02-15 @ 17:38), 173 (02-15 @ 01:17), 150 (02-14 @ 10:09)    INR: 1.00 02-15-19 @ 01:17, 0.92 02-14-19 @ 10:09  PTT: 27.9 02-15-19 @ 01:17, 26.4 02-14-19 @ 10:09            A/P:  Left BG ICH, expanded on repeat CT head, CTA no vascular malformation, has cytotoxic edema   most likley HTN  ICH score 1  EtOH withdrawal.  Acute hypoxic resp distress.    Neuro:   ICH expanded, repeat head CT shows a volume 37 cc, stable   neuro checks q 1 hr,   alcoholic, start on precedex and phenobarbital   cont thiamine + supplements  start labetolol 300 mg q 8 hrs   CT head tomorrow   MRI brain wwo to r/o mass   Respiratory failure, intubated, likely from sedation for alcohol withdrawal, aspiration pneumonia full vent support  PS   CV: -140 mmhg, try to wean off Nicardipine, increase labetolol 400 mg q 8 hrs   Endocrine: finger sticks q 6 hrs, IS  Renal:2% 75 ml/hr for cerebral edema,  salt tabs, Na goal 145-150, BMP q 6 hrs , sodium goal 140-145 ,   ID: febrile - panCx , cx negative so, Zosyn and Vanco - renally dosed   GI: NPO for stability scan /  , if cx remain negative, will d/c tomorrow Heme/Onc: DVT ppx:  SCD, keep lovenox on hold given that he expanded yesterday, if CT head tomorrow stable   Social/Family: ICU  Discharge planning: ICU      Code Status: [x] Full Code [] DNR [] DNI [] Goals of Care:   Disposition: [x] ICU [] Stroke Unit [] RCU []PCU []Floor [] Discharge Home     Patient at high risk for neurologic deterioration, ICH expanding, seizures    critical care time, excluding procedures: 45 minutes

## 2019-02-17 NOTE — PROGRESS NOTE ADULT - SUBJECTIVE AND OBJECTIVE BOX
Patient seen and evaluated at bedside.    Overnight Events: none    Exam:           Vital Signs Last 24 Hrs  T(C): 36.8 (17 Feb 2019 03:00), Max: 38.4 (16 Feb 2019 08:00)  T(F): 98.2 (17 Feb 2019 03:00), Max: 101.2 (16 Feb 2019 15:00)  HR: 94 (17 Feb 2019 03:00) (84 - 108)  BP: --  BP(mean): --  RR: 23 (17 Feb 2019 03:00) (14 - 48)  SpO2: 99% (17 Feb 2019 03:00) (91% - 100%)    Awake, eyes open spontaneously, global aphasia, mumbles some words, not following commands, moves left side with 5/5 strength spontaneously, RUE 0/5, RLE 3/5, pupils 3mm and brisk bilaterally, extraocular movements intact                                 14.1   9.2   )-----------( 153      ( 16 Feb 2019 03:38 )             39.9   02-16    148<H>  |  118<H>  |  10  ----------------------------<  136<H>  3.7   |  19<L>  |  0.92    Ca    7.8<L>      16 Feb 2019 21:13  Phos  2.5     02-16  Mg     2.1     02-16    TPro  6.7  /  Alb  3.8  /  TBili  0.8  /  DBili  x   /  AST  16  /  ALT  22  /  AlkPhos  49  02-16

## 2019-02-17 NOTE — PROGRESS NOTE ADULT - ASSESSMENT
48M here with BG hemorrhage  - Stroke neuro eval  - Stroke core measures  - Cont supportive care per ICU

## 2019-02-18 LAB
ANION GAP SERPL CALC-SCNC: 10 MMOL/L — SIGNIFICANT CHANGE UP (ref 5–17)
ANION GAP SERPL CALC-SCNC: 11 MMOL/L — SIGNIFICANT CHANGE UP (ref 5–17)
ANION GAP SERPL CALC-SCNC: 11 MMOL/L — SIGNIFICANT CHANGE UP (ref 5–17)
ANION GAP SERPL CALC-SCNC: 12 MMOL/L — SIGNIFICANT CHANGE UP (ref 5–17)
BUN SERPL-MCNC: 6 MG/DL — LOW (ref 7–23)
BUN SERPL-MCNC: 6 MG/DL — LOW (ref 7–23)
BUN SERPL-MCNC: 8 MG/DL — SIGNIFICANT CHANGE UP (ref 7–23)
BUN SERPL-MCNC: 8 MG/DL — SIGNIFICANT CHANGE UP (ref 7–23)
CALCIUM SERPL-MCNC: 8 MG/DL — LOW (ref 8.4–10.5)
CALCIUM SERPL-MCNC: 8.4 MG/DL — SIGNIFICANT CHANGE UP (ref 8.4–10.5)
CALCIUM SERPL-MCNC: 8.5 MG/DL — SIGNIFICANT CHANGE UP (ref 8.4–10.5)
CALCIUM SERPL-MCNC: 8.7 MG/DL — SIGNIFICANT CHANGE UP (ref 8.4–10.5)
CHLORIDE SERPL-SCNC: 116 MMOL/L — HIGH (ref 96–108)
CHLORIDE SERPL-SCNC: 117 MMOL/L — HIGH (ref 96–108)
CHLORIDE SERPL-SCNC: 117 MMOL/L — HIGH (ref 96–108)
CHLORIDE SERPL-SCNC: 120 MMOL/L — HIGH (ref 96–108)
CO2 SERPL-SCNC: 17 MMOL/L — LOW (ref 22–31)
CO2 SERPL-SCNC: 18 MMOL/L — LOW (ref 22–31)
CO2 SERPL-SCNC: 19 MMOL/L — LOW (ref 22–31)
CO2 SERPL-SCNC: 21 MMOL/L — LOW (ref 22–31)
CREAT SERPL-MCNC: 0.7 MG/DL — SIGNIFICANT CHANGE UP (ref 0.5–1.3)
CREAT SERPL-MCNC: 0.7 MG/DL — SIGNIFICANT CHANGE UP (ref 0.5–1.3)
CREAT SERPL-MCNC: 0.74 MG/DL — SIGNIFICANT CHANGE UP (ref 0.5–1.3)
CREAT SERPL-MCNC: 0.74 MG/DL — SIGNIFICANT CHANGE UP (ref 0.5–1.3)
GAS PNL BLDA: SIGNIFICANT CHANGE UP
GLUCOSE SERPL-MCNC: 171 MG/DL — HIGH (ref 70–99)
GLUCOSE SERPL-MCNC: 207 MG/DL — HIGH (ref 70–99)
GLUCOSE SERPL-MCNC: 216 MG/DL — HIGH (ref 70–99)
GLUCOSE SERPL-MCNC: 227 MG/DL — HIGH (ref 70–99)
GRAM STN FLD: SIGNIFICANT CHANGE UP
MAGNESIUM SERPL-MCNC: 2 MG/DL — SIGNIFICANT CHANGE UP (ref 1.6–2.6)
MAGNESIUM SERPL-MCNC: 2.1 MG/DL — SIGNIFICANT CHANGE UP (ref 1.6–2.6)
PHOSPHATE SERPL-MCNC: 1.8 MG/DL — LOW (ref 2.5–4.5)
PHOSPHATE SERPL-MCNC: 2.8 MG/DL — SIGNIFICANT CHANGE UP (ref 2.5–4.5)
POTASSIUM SERPL-MCNC: 3.5 MMOL/L — SIGNIFICANT CHANGE UP (ref 3.5–5.3)
POTASSIUM SERPL-MCNC: 3.6 MMOL/L — SIGNIFICANT CHANGE UP (ref 3.5–5.3)
POTASSIUM SERPL-MCNC: 3.9 MMOL/L — SIGNIFICANT CHANGE UP (ref 3.5–5.3)
POTASSIUM SERPL-MCNC: 3.9 MMOL/L — SIGNIFICANT CHANGE UP (ref 3.5–5.3)
POTASSIUM SERPL-SCNC: 3.5 MMOL/L — SIGNIFICANT CHANGE UP (ref 3.5–5.3)
POTASSIUM SERPL-SCNC: 3.6 MMOL/L — SIGNIFICANT CHANGE UP (ref 3.5–5.3)
POTASSIUM SERPL-SCNC: 3.9 MMOL/L — SIGNIFICANT CHANGE UP (ref 3.5–5.3)
POTASSIUM SERPL-SCNC: 3.9 MMOL/L — SIGNIFICANT CHANGE UP (ref 3.5–5.3)
SODIUM SERPL-SCNC: 145 MMOL/L — SIGNIFICANT CHANGE UP (ref 135–145)
SODIUM SERPL-SCNC: 145 MMOL/L — SIGNIFICANT CHANGE UP (ref 135–145)
SODIUM SERPL-SCNC: 149 MMOL/L — HIGH (ref 135–145)
SODIUM SERPL-SCNC: 150 MMOL/L — HIGH (ref 135–145)
VANCOMYCIN TROUGH SERPL-MCNC: 4 UG/ML — LOW (ref 10–20)

## 2019-02-18 PROCEDURE — 99291 CRITICAL CARE FIRST HOUR: CPT

## 2019-02-18 PROCEDURE — 71045 X-RAY EXAM CHEST 1 VIEW: CPT | Mod: 26

## 2019-02-18 PROCEDURE — 70450 CT HEAD/BRAIN W/O DYE: CPT | Mod: 26

## 2019-02-18 RX ORDER — HYDRALAZINE HCL 50 MG
25 TABLET ORAL EVERY 8 HOURS
Qty: 0 | Refills: 0 | Status: DISCONTINUED | OUTPATIENT
Start: 2019-02-18 | End: 2019-02-18

## 2019-02-18 RX ORDER — PIPERACILLIN AND TAZOBACTAM 4; .5 G/20ML; G/20ML
3.38 INJECTION, POWDER, LYOPHILIZED, FOR SOLUTION INTRAVENOUS EVERY 8 HOURS
Qty: 0 | Refills: 0 | Status: DISCONTINUED | OUTPATIENT
Start: 2019-02-18 | End: 2019-02-21

## 2019-02-18 RX ORDER — POTASSIUM CHLORIDE 20 MEQ
40 PACKET (EA) ORAL ONCE
Qty: 0 | Refills: 0 | Status: COMPLETED | OUTPATIENT
Start: 2019-02-18 | End: 2019-02-19

## 2019-02-18 RX ORDER — POTASSIUM PHOSPHATE, MONOBASIC POTASSIUM PHOSPHATE, DIBASIC 236; 224 MG/ML; MG/ML
30 INJECTION, SOLUTION INTRAVENOUS ONCE
Qty: 0 | Refills: 0 | Status: COMPLETED | OUTPATIENT
Start: 2019-02-18 | End: 2019-02-18

## 2019-02-18 RX ORDER — FENTANYL CITRATE 50 UG/ML
50 INJECTION INTRAVENOUS ONCE
Qty: 0 | Refills: 0 | Status: DISCONTINUED | OUTPATIENT
Start: 2019-02-18 | End: 2019-02-18

## 2019-02-18 RX ORDER — VANCOMYCIN HCL 1 G
1500 VIAL (EA) INTRAVENOUS EVERY 12 HOURS
Qty: 0 | Refills: 0 | Status: DISCONTINUED | OUTPATIENT
Start: 2019-02-18 | End: 2019-02-19

## 2019-02-18 RX ORDER — INSULIN HUMAN 100 [IU]/ML
3 INJECTION, SOLUTION SUBCUTANEOUS
Qty: 100 | Refills: 0 | Status: DISCONTINUED | OUTPATIENT
Start: 2019-02-18 | End: 2019-02-19

## 2019-02-18 RX ORDER — HYDRALAZINE HCL 50 MG
50 TABLET ORAL EVERY 8 HOURS
Qty: 0 | Refills: 0 | Status: DISCONTINUED | OUTPATIENT
Start: 2019-02-18 | End: 2019-02-20

## 2019-02-18 RX ORDER — POTASSIUM CHLORIDE 20 MEQ
40 PACKET (EA) ORAL ONCE
Qty: 0 | Refills: 0 | Status: COMPLETED | OUTPATIENT
Start: 2019-02-18 | End: 2019-02-18

## 2019-02-18 RX ORDER — ENOXAPARIN SODIUM 100 MG/ML
40 INJECTION SUBCUTANEOUS
Qty: 0 | Refills: 0 | Status: DISCONTINUED | OUTPATIENT
Start: 2019-02-18 | End: 2019-02-25

## 2019-02-18 RX ADMIN — Medication 300 MILLIGRAM(S): at 09:05

## 2019-02-18 RX ADMIN — Medication 400 MILLIGRAM(S): at 13:42

## 2019-02-18 RX ADMIN — Medication 0.1 MILLIGRAM(S): at 17:20

## 2019-02-18 RX ADMIN — Medication 400 MILLIGRAM(S): at 05:23

## 2019-02-18 RX ADMIN — Medication 650 MILLIGRAM(S): at 19:00

## 2019-02-18 RX ADMIN — INSULIN HUMAN 3 UNIT(S)/HR: 100 INJECTION, SOLUTION SUBCUTANEOUS at 10:48

## 2019-02-18 RX ADMIN — Medication 100 MILLIGRAM(S): at 11:40

## 2019-02-18 RX ADMIN — Medication 1 MILLIGRAM(S): at 11:41

## 2019-02-18 RX ADMIN — POTASSIUM PHOSPHATE, MONOBASIC POTASSIUM PHOSPHATE, DIBASIC 83.33 MILLIMOLE(S): 236; 224 INJECTION, SOLUTION INTRAVENOUS at 01:40

## 2019-02-18 RX ADMIN — Medication 650 MILLIGRAM(S): at 19:30

## 2019-02-18 RX ADMIN — FAMOTIDINE 20 MILLIGRAM(S): 10 INJECTION INTRAVENOUS at 05:23

## 2019-02-18 RX ADMIN — SODIUM CHLORIDE 75 MILLILITER(S): 5 INJECTION, SOLUTION INTRAVENOUS at 07:00

## 2019-02-18 RX ADMIN — Medication 50 MILLIGRAM(S): at 17:21

## 2019-02-18 RX ADMIN — PIPERACILLIN AND TAZOBACTAM 25 GRAM(S): 4; .5 INJECTION, POWDER, LYOPHILIZED, FOR SOLUTION INTRAVENOUS at 13:42

## 2019-02-18 RX ADMIN — DEXMEDETOMIDINE HYDROCHLORIDE IN 0.9% SODIUM CHLORIDE 4.51 MICROGRAM(S)/KG/HR: 4 INJECTION INTRAVENOUS at 07:00

## 2019-02-18 RX ADMIN — Medication 25 MILLIGRAM(S): at 21:34

## 2019-02-18 RX ADMIN — Medication 4: at 06:07

## 2019-02-18 RX ADMIN — NICARDIPINE HYDROCHLORIDE 25 MG/HR: 30 CAPSULE, EXTENDED RELEASE ORAL at 21:38

## 2019-02-18 RX ADMIN — Medication 1 TABLET(S): at 11:41

## 2019-02-18 RX ADMIN — SODIUM CHLORIDE 2 GRAM(S): 9 INJECTION INTRAMUSCULAR; INTRAVENOUS; SUBCUTANEOUS at 13:42

## 2019-02-18 RX ADMIN — Medication 30 MILLIGRAM(S): at 05:23

## 2019-02-18 RX ADMIN — Medication 4: at 00:55

## 2019-02-18 RX ADMIN — Medication 650 MILLIGRAM(S): at 11:38

## 2019-02-18 RX ADMIN — Medication 300 MILLIGRAM(S): at 20:02

## 2019-02-18 RX ADMIN — NICARDIPINE HYDROCHLORIDE 25 MG/HR: 30 CAPSULE, EXTENDED RELEASE ORAL at 07:00

## 2019-02-18 RX ADMIN — CHLORHEXIDINE GLUCONATE 1 APPLICATION(S): 213 SOLUTION TOPICAL at 21:43

## 2019-02-18 RX ADMIN — Medication 400 MILLIGRAM(S): at 21:33

## 2019-02-18 RX ADMIN — Medication 40 MILLIEQUIVALENT(S): at 00:55

## 2019-02-18 RX ADMIN — SODIUM CHLORIDE 75 MILLILITER(S): 5 INJECTION, SOLUTION INTRAVENOUS at 21:39

## 2019-02-18 RX ADMIN — INSULIN HUMAN 3 UNIT(S)/HR: 100 INJECTION, SOLUTION SUBCUTANEOUS at 21:39

## 2019-02-18 RX ADMIN — SODIUM CHLORIDE 2 GRAM(S): 9 INJECTION INTRAMUSCULAR; INTRAVENOUS; SUBCUTANEOUS at 05:23

## 2019-02-18 RX ADMIN — PIPERACILLIN AND TAZOBACTAM 25 GRAM(S): 4; .5 INJECTION, POWDER, LYOPHILIZED, FOR SOLUTION INTRAVENOUS at 21:33

## 2019-02-18 RX ADMIN — Medication 25 MILLIGRAM(S): at 14:42

## 2019-02-18 RX ADMIN — ENOXAPARIN SODIUM 40 MILLIGRAM(S): 100 INJECTION SUBCUTANEOUS at 17:27

## 2019-02-18 RX ADMIN — Medication 25 MILLIGRAM(S): at 05:58

## 2019-02-18 RX ADMIN — FENTANYL CITRATE 50 MICROGRAM(S): 50 INJECTION INTRAVENOUS at 18:30

## 2019-02-18 RX ADMIN — DEXMEDETOMIDINE HYDROCHLORIDE IN 0.9% SODIUM CHLORIDE 4.51 MICROGRAM(S)/KG/HR: 4 INJECTION INTRAVENOUS at 21:35

## 2019-02-18 RX ADMIN — PIPERACILLIN AND TAZOBACTAM 25 GRAM(S): 4; .5 INJECTION, POWDER, LYOPHILIZED, FOR SOLUTION INTRAVENOUS at 05:24

## 2019-02-18 RX ADMIN — SODIUM CHLORIDE 2 GRAM(S): 9 INJECTION INTRAMUSCULAR; INTRAVENOUS; SUBCUTANEOUS at 21:33

## 2019-02-18 RX ADMIN — FAMOTIDINE 20 MILLIGRAM(S): 10 INJECTION INTRAVENOUS at 17:20

## 2019-02-18 NOTE — PROGRESS NOTE ADULT - ASSESSMENT
A/P:  Left BG ICH, CTA no vascular malformation, most likley HTN etiology.  ICH score 1  EtOH withdrawal.  Acute hypoxic resp distress.    Neuro:   neuro checks q 1 hr,   CTh stable  On Precedex, received Phenobarb for 2 days, started on Librium now  cont thiamine + supplements  Respiratory: likely aspiration pneumonia, full vent support  CV: -140 mmhg, try to wean off Nicardipine, cont labetolol 300 mg q 8 hrs   Endocrine: finger sticks q 6 hrs, IS  Renal:2% @100 + salt tabs, Na goal 145-150, BMP q 6 hrs   ID: febrile -  Zosyn and Vanco - renally dosed, growing Coag neg Staph in BCx  GI: on TF  Heme/Onc: DVT ppx:  SCD  Social/Family: ICU  Discharge planning: ICU      Code Status: [x] Full Code [] DNR [] DNI [] Goals of Care:   Disposition: [x] ICU [] Stroke Unit [] RCU []PCU []Floor [] Discharge Home     Patient at high risk for neurologic deterioration, ICH expanding, seizures    critical care time, excluding procedures: 45 minutes

## 2019-02-18 NOTE — PROGRESS NOTE ADULT - SUBJECTIVE AND OBJECTIVE BOX
47 yo M with h/o EtOH, presented with Lt BG hemorrhage, went into [facility] - intubated.  No o/n events.    Vital signs, labs, imaging reviewed.      Exam:    General: sedated, intubated.    Neurological:   not EO, not FC, moving L side strongly, 0/5 in the right UE, 1/5 in the right LE . PERRL,    Pulmonary: Clear to Auscultation, No Rales, No Rhonchi, No Wheezes     Cardiovascular: S1, S2, Regular Rate and Rhythm     Gastrointestinal: Soft, Nontender, Nondistended     Extremities: No calf tenderness

## 2019-02-18 NOTE — PROVIDER CONTACT NOTE (CRITICAL VALUE NOTIFICATION) - TEST AND RESULT REPORTED:
blood cultures drawn 02/16/2019  preliminary growth in aerobic bottle - gram positive cocci in clusters

## 2019-02-18 NOTE — SWALLOW BEDSIDE ASSESSMENT ADULT - SWALLOW EVAL: DIAGNOSIS
Consult for bedside swallow evaluation received and appreciated. At this time pt intubated and not appropriate for evaluation. Please reconsult when medically appropriate.

## 2019-02-19 LAB
ANION GAP SERPL CALC-SCNC: 11 MMOL/L — SIGNIFICANT CHANGE UP (ref 5–17)
ANION GAP SERPL CALC-SCNC: 12 MMOL/L — SIGNIFICANT CHANGE UP (ref 5–17)
APPEARANCE UR: CLEAR — SIGNIFICANT CHANGE UP
BILIRUB UR-MCNC: NEGATIVE — SIGNIFICANT CHANGE UP
BUN SERPL-MCNC: 10 MG/DL — SIGNIFICANT CHANGE UP (ref 7–23)
BUN SERPL-MCNC: 9 MG/DL — SIGNIFICANT CHANGE UP (ref 7–23)
C DIFF GDH STL QL: NEGATIVE — SIGNIFICANT CHANGE UP
C DIFF GDH STL QL: SIGNIFICANT CHANGE UP
CALCIUM SERPL-MCNC: 8.9 MG/DL — SIGNIFICANT CHANGE UP (ref 8.4–10.5)
CALCIUM SERPL-MCNC: 8.9 MG/DL — SIGNIFICANT CHANGE UP (ref 8.4–10.5)
CHLORIDE SERPL-SCNC: 117 MMOL/L — HIGH (ref 96–108)
CHLORIDE SERPL-SCNC: 117 MMOL/L — HIGH (ref 96–108)
CO2 SERPL-SCNC: 21 MMOL/L — LOW (ref 22–31)
CO2 SERPL-SCNC: 21 MMOL/L — LOW (ref 22–31)
COLOR SPEC: SIGNIFICANT CHANGE UP
CREAT SERPL-MCNC: 0.67 MG/DL — SIGNIFICANT CHANGE UP (ref 0.5–1.3)
CREAT SERPL-MCNC: 0.68 MG/DL — SIGNIFICANT CHANGE UP (ref 0.5–1.3)
DIFF PNL FLD: NEGATIVE — SIGNIFICANT CHANGE UP
GLUCOSE SERPL-MCNC: 131 MG/DL — HIGH (ref 70–99)
GLUCOSE SERPL-MCNC: 148 MG/DL — HIGH (ref 70–99)
GLUCOSE UR QL: NEGATIVE — SIGNIFICANT CHANGE UP
HCT VFR BLD CALC: 33.2 % — LOW (ref 39–50)
HCT VFR BLD CALC: 34.4 % — LOW (ref 39–50)
HGB BLD-MCNC: 11.4 G/DL — LOW (ref 13–17)
HGB BLD-MCNC: 11.6 G/DL — LOW (ref 13–17)
KETONES UR-MCNC: NEGATIVE — SIGNIFICANT CHANGE UP
LEUKOCYTE ESTERASE UR-ACNC: NEGATIVE — SIGNIFICANT CHANGE UP
MAGNESIUM SERPL-MCNC: 2.2 MG/DL — SIGNIFICANT CHANGE UP (ref 1.6–2.6)
MAGNESIUM SERPL-MCNC: 2.2 MG/DL — SIGNIFICANT CHANGE UP (ref 1.6–2.6)
MCHC RBC-ENTMCNC: 31.8 PG — SIGNIFICANT CHANGE UP (ref 27–34)
MCHC RBC-ENTMCNC: 32 PG — SIGNIFICANT CHANGE UP (ref 27–34)
MCHC RBC-ENTMCNC: 33.9 GM/DL — SIGNIFICANT CHANGE UP (ref 32–36)
MCHC RBC-ENTMCNC: 34.3 GM/DL — SIGNIFICANT CHANGE UP (ref 32–36)
MCV RBC AUTO: 93.1 FL — SIGNIFICANT CHANGE UP (ref 80–100)
MCV RBC AUTO: 93.7 FL — SIGNIFICANT CHANGE UP (ref 80–100)
NITRITE UR-MCNC: NEGATIVE — SIGNIFICANT CHANGE UP
PH UR: 6.5 — SIGNIFICANT CHANGE UP (ref 5–8)
PHOSPHATE SERPL-MCNC: 3.4 MG/DL — SIGNIFICANT CHANGE UP (ref 2.5–4.5)
PHOSPHATE SERPL-MCNC: 4 MG/DL — SIGNIFICANT CHANGE UP (ref 2.5–4.5)
PLATELET # BLD AUTO: 157 K/UL — SIGNIFICANT CHANGE UP (ref 150–400)
PLATELET # BLD AUTO: 179 K/UL — SIGNIFICANT CHANGE UP (ref 150–400)
POTASSIUM SERPL-MCNC: 3.7 MMOL/L — SIGNIFICANT CHANGE UP (ref 3.5–5.3)
POTASSIUM SERPL-MCNC: 3.7 MMOL/L — SIGNIFICANT CHANGE UP (ref 3.5–5.3)
POTASSIUM SERPL-SCNC: 3.7 MMOL/L — SIGNIFICANT CHANGE UP (ref 3.5–5.3)
POTASSIUM SERPL-SCNC: 3.7 MMOL/L — SIGNIFICANT CHANGE UP (ref 3.5–5.3)
PROT UR-MCNC: NEGATIVE — SIGNIFICANT CHANGE UP
RBC # BLD: 3.57 M/UL — LOW (ref 4.2–5.8)
RBC # BLD: 3.67 M/UL — LOW (ref 4.2–5.8)
RBC # FLD: 11.5 % — SIGNIFICANT CHANGE UP (ref 10.3–14.5)
RBC # FLD: 11.6 % — SIGNIFICANT CHANGE UP (ref 10.3–14.5)
SODIUM SERPL-SCNC: 149 MMOL/L — HIGH (ref 135–145)
SODIUM SERPL-SCNC: 150 MMOL/L — HIGH (ref 135–145)
SP GR SPEC: 1.02 — SIGNIFICANT CHANGE UP (ref 1.01–1.02)
UROBILINOGEN FLD QL: NEGATIVE — SIGNIFICANT CHANGE UP
VANCOMYCIN TROUGH SERPL-MCNC: 7.3 UG/ML — LOW (ref 10–20)
WBC # BLD: 5.9 K/UL — SIGNIFICANT CHANGE UP (ref 3.8–10.5)
WBC # BLD: 6.4 K/UL — SIGNIFICANT CHANGE UP (ref 3.8–10.5)
WBC # FLD AUTO: 5.9 K/UL — SIGNIFICANT CHANGE UP (ref 3.8–10.5)
WBC # FLD AUTO: 6.4 K/UL — SIGNIFICANT CHANGE UP (ref 3.8–10.5)

## 2019-02-19 PROCEDURE — 71045 X-RAY EXAM CHEST 1 VIEW: CPT | Mod: 26

## 2019-02-19 PROCEDURE — 99291 CRITICAL CARE FIRST HOUR: CPT

## 2019-02-19 PROCEDURE — 74018 RADEX ABDOMEN 1 VIEW: CPT | Mod: 26

## 2019-02-19 RX ORDER — FENTANYL CITRATE 50 UG/ML
25 INJECTION INTRAVENOUS
Qty: 0 | Refills: 0 | Status: DISCONTINUED | OUTPATIENT
Start: 2019-02-19 | End: 2019-02-25

## 2019-02-19 RX ORDER — DEXTROSE 50 % IN WATER 50 %
15 SYRINGE (ML) INTRAVENOUS ONCE
Qty: 0 | Refills: 0 | Status: DISCONTINUED | OUTPATIENT
Start: 2019-02-19 | End: 2019-02-25

## 2019-02-19 RX ORDER — SODIUM CHLORIDE 9 MG/ML
1000 INJECTION INTRAMUSCULAR; INTRAVENOUS; SUBCUTANEOUS
Qty: 0 | Refills: 0 | Status: DISCONTINUED | OUTPATIENT
Start: 2019-02-19 | End: 2019-02-20

## 2019-02-19 RX ORDER — DEXTROSE 50 % IN WATER 50 %
25 SYRINGE (ML) INTRAVENOUS ONCE
Qty: 0 | Refills: 0 | Status: DISCONTINUED | OUTPATIENT
Start: 2019-02-19 | End: 2019-02-25

## 2019-02-19 RX ORDER — INSULIN LISPRO 100/ML
VIAL (ML) SUBCUTANEOUS EVERY 6 HOURS
Qty: 0 | Refills: 0 | Status: DISCONTINUED | OUTPATIENT
Start: 2019-02-19 | End: 2019-02-25

## 2019-02-19 RX ORDER — DEXTROSE 50 % IN WATER 50 %
12.5 SYRINGE (ML) INTRAVENOUS ONCE
Qty: 0 | Refills: 0 | Status: DISCONTINUED | OUTPATIENT
Start: 2019-02-19 | End: 2019-02-25

## 2019-02-19 RX ORDER — INSULIN LISPRO 100/ML
VIAL (ML) SUBCUTANEOUS EVERY 4 HOURS
Qty: 0 | Refills: 0 | Status: DISCONTINUED | OUTPATIENT
Start: 2019-02-19 | End: 2019-02-19

## 2019-02-19 RX ORDER — SODIUM CHLORIDE 9 MG/ML
1000 INJECTION, SOLUTION INTRAVENOUS
Qty: 0 | Refills: 0 | Status: DISCONTINUED | OUTPATIENT
Start: 2019-02-19 | End: 2019-02-25

## 2019-02-19 RX ORDER — VANCOMYCIN HCL 1 G
1250 VIAL (EA) INTRAVENOUS EVERY 8 HOURS
Qty: 0 | Refills: 0 | Status: DISCONTINUED | OUTPATIENT
Start: 2019-02-19 | End: 2019-02-20

## 2019-02-19 RX ORDER — HUMAN INSULIN 100 [IU]/ML
18 INJECTION, SUSPENSION SUBCUTANEOUS EVERY 6 HOURS
Qty: 0 | Refills: 0 | Status: DISCONTINUED | OUTPATIENT
Start: 2019-02-19 | End: 2019-02-25

## 2019-02-19 RX ORDER — SODIUM CHLORIDE 5 G/100ML
1000 INJECTION, SOLUTION INTRAVENOUS
Qty: 0 | Refills: 0 | Status: DISCONTINUED | OUTPATIENT
Start: 2019-02-19 | End: 2019-02-19

## 2019-02-19 RX ORDER — GLUCAGON INJECTION, SOLUTION 0.5 MG/.1ML
1 INJECTION, SOLUTION SUBCUTANEOUS ONCE
Qty: 0 | Refills: 0 | Status: DISCONTINUED | OUTPATIENT
Start: 2019-02-19 | End: 2019-02-25

## 2019-02-19 RX ORDER — HUMAN INSULIN 100 [IU]/ML
5 INJECTION, SUSPENSION SUBCUTANEOUS ONCE
Qty: 0 | Refills: 0 | Status: COMPLETED | OUTPATIENT
Start: 2019-02-19 | End: 2019-02-19

## 2019-02-19 RX ADMIN — Medication 400 MILLIGRAM(S): at 13:13

## 2019-02-19 RX ADMIN — PIPERACILLIN AND TAZOBACTAM 25 GRAM(S): 4; .5 INJECTION, POWDER, LYOPHILIZED, FOR SOLUTION INTRAVENOUS at 21:10

## 2019-02-19 RX ADMIN — Medication 100 MILLIGRAM(S): at 13:13

## 2019-02-19 RX ADMIN — Medication 2: at 23:49

## 2019-02-19 RX ADMIN — Medication 650 MILLIGRAM(S): at 18:30

## 2019-02-19 RX ADMIN — CHLORHEXIDINE GLUCONATE 1 APPLICATION(S): 213 SOLUTION TOPICAL at 22:03

## 2019-02-19 RX ADMIN — Medication 1 MILLIGRAM(S): at 13:13

## 2019-02-19 RX ADMIN — SODIUM CHLORIDE 2 GRAM(S): 9 INJECTION INTRAMUSCULAR; INTRAVENOUS; SUBCUTANEOUS at 21:10

## 2019-02-19 RX ADMIN — Medication 50 MILLIGRAM(S): at 05:54

## 2019-02-19 RX ADMIN — SODIUM CHLORIDE 2 GRAM(S): 9 INJECTION INTRAMUSCULAR; INTRAVENOUS; SUBCUTANEOUS at 05:53

## 2019-02-19 RX ADMIN — Medication 400 MILLIGRAM(S): at 21:10

## 2019-02-19 RX ADMIN — FAMOTIDINE 20 MILLIGRAM(S): 10 INJECTION INTRAVENOUS at 17:30

## 2019-02-19 RX ADMIN — HUMAN INSULIN 18 UNIT(S): 100 INJECTION, SUSPENSION SUBCUTANEOUS at 12:32

## 2019-02-19 RX ADMIN — Medication 50 MILLIGRAM(S): at 14:55

## 2019-02-19 RX ADMIN — HUMAN INSULIN 5 UNIT(S): 100 INJECTION, SUSPENSION SUBCUTANEOUS at 23:49

## 2019-02-19 RX ADMIN — Medication 166.67 MILLIGRAM(S): at 22:38

## 2019-02-19 RX ADMIN — Medication 0.1 MILLIGRAM(S): at 05:54

## 2019-02-19 RX ADMIN — Medication 40 MILLIEQUIVALENT(S): at 00:08

## 2019-02-19 RX ADMIN — Medication 400 MILLIGRAM(S): at 05:53

## 2019-02-19 RX ADMIN — HUMAN INSULIN 18 UNIT(S): 100 INJECTION, SUSPENSION SUBCUTANEOUS at 17:44

## 2019-02-19 RX ADMIN — ENOXAPARIN SODIUM 40 MILLIGRAM(S): 100 INJECTION SUBCUTANEOUS at 17:30

## 2019-02-19 RX ADMIN — Medication 50 MILLIGRAM(S): at 22:03

## 2019-02-19 RX ADMIN — PIPERACILLIN AND TAZOBACTAM 25 GRAM(S): 4; .5 INJECTION, POWDER, LYOPHILIZED, FOR SOLUTION INTRAVENOUS at 05:54

## 2019-02-19 RX ADMIN — FAMOTIDINE 20 MILLIGRAM(S): 10 INJECTION INTRAVENOUS at 05:54

## 2019-02-19 RX ADMIN — Medication 300 MILLIGRAM(S): at 08:54

## 2019-02-19 RX ADMIN — PIPERACILLIN AND TAZOBACTAM 25 GRAM(S): 4; .5 INJECTION, POWDER, LYOPHILIZED, FOR SOLUTION INTRAVENOUS at 13:11

## 2019-02-19 RX ADMIN — FENTANYL CITRATE 25 MICROGRAM(S): 50 INJECTION INTRAVENOUS at 20:15

## 2019-02-19 RX ADMIN — Medication 50 MILLIGRAM(S): at 17:30

## 2019-02-19 RX ADMIN — Medication 650 MILLIGRAM(S): at 17:29

## 2019-02-19 RX ADMIN — FENTANYL CITRATE 25 MICROGRAM(S): 50 INJECTION INTRAVENOUS at 19:59

## 2019-02-19 RX ADMIN — Medication 0.2 MILLIGRAM(S): at 17:44

## 2019-02-19 RX ADMIN — SODIUM CHLORIDE 2 GRAM(S): 9 INJECTION INTRAMUSCULAR; INTRAVENOUS; SUBCUTANEOUS at 13:12

## 2019-02-19 NOTE — DIETITIAN INITIAL EVALUATION ADULT. - OTHER INFO
Pt seen for length of stay in [facility]. Per chart pt with h/o EtOH abuse (5 beers daily), presented with left basal ganglia ICH with cerebral edema, most likely HTN etiology. Pt NPO with tube feed, receiving Glucerna @ goal of 55ml/hr x 24 hours via NGT. Per flowsheet, pt received 1320ml of Glucerna x 24 hours (100% of goal). Per RN pt with diarrhea, plan to send culture for C diff. Pt ordered for Danactive probiotic  2x daily to begin today, RN providing at time of RD visit. RD to continue to monitor GI tolerance/if need for formula change. Per pt's family, pt with stable weight. Pt's family declined to have any nutrition-related questions/concerns at this time, made aware RD remains available.

## 2019-02-19 NOTE — DISCHARGE NOTE ADULT - PATIENT PORTAL LINK FT
You can access the [facility] Patient Portal, offered by [facility], by registering with the following website: [email or web address]

## 2019-02-19 NOTE — DIETITIAN INITIAL EVALUATION ADULT. - ENERGY NEEDS
Ht: 72 Wt: 199 pounds BMI: 27 kg/m2 IBW:  178 pounds(+/-10%)   +1 generalized edema. No pressure ulcers documented.

## 2019-02-19 NOTE — PROGRESS NOTE ADULT - ASSESSMENT
A/P:  Left BG ICH, CTA no vascular malformation, most likley HTN etiology.  ICH score 1  EtOH withdrawal.  Acute hypoxic resp distress.    Neuro:   neuro checks q 1 hr,   CTh stable  On Precedex, received Phenobarb for 2 days, started on Librium now  cont thiamine + supplements  Respiratory: likely aspiration pneumonia, full vent support  CV: -140 mmhg, try to wean off Nicardipine, cont labetolol 300 mg q 8 hrs   Endocrine: finger sticks q 6 hrs, IS  Renal:2% @100 + salt tabs, Na goal 145-150, BMP q 6 hrs   ID: febrile -  Zosyn and Vanco - renally dosed, growing Coag neg Staph in BCx  GI: on TF  Heme/Onc: DVT ppx:  SCD  Social/Family: ICU  Discharge planning: ICU      Code Status: [x] Full Code [] DNR [] DNI [] Goals of Care:   Disposition: [x] ICU [] Stroke Unit [] RCU []PCU []Floor [] Discharge Home     Patient at high risk for neurologic deterioration, ICH expanding, seizures    critical care time, excluding procedures: 45 minutes A/P:  Left BG ICH, CTA no vascular malformation, most likley HTN etiology.  ICH score 1  EtOH withdrawal.  Acute hypoxic resp distress.    Neuro:   neuro checks q 1 hr,   CTh stable  On Precedex, received Phenobarb for 2 days, started on Librium now  cont thiamine + supplements  Respiratory: likely aspiration pneumonia, full vent support  CV: -140 mmhg, try to wean off Nicardipine, cont labetolol 300 mg q 8 hrs   Endocrine: finger sticks q 6 hrs, IS  Renal:2% @100 + salt tabs, Na goal 145-150, BMP q 6 hrs   ID: febrile -  Zosyn and Vanco - renally dosed, growing Coag neg Staph in BCx possibly contaminant   GI: on TF, diarrhea, will send cx for C.diff   Heme/Onc: DVT ppx:  SCD  Social/Family: ICU  Discharge planning: ICU      Code Status: [x] Full Code [] DNR [] DNI [] Goals of Care:   Disposition: [x] ICU [] Stroke Unit [] RCU []PCU []Floor [] Discharge Home     Patient at high risk for neurologic deterioration, ICH expanding, seizures    critical care time, excluding procedures: 45 minutes A/P:  Left BG ICH, CTA no vascular malformation, most likley HTN etiology.  ICH score 1  EtOH withdrawal.  Acute hypoxic resp distress.    Neuro:   neuro checks q 1 hr,   CTh stable  alcohol withdrawal: he is less agitated today, continue librium   On Precedex, will discontinue it   cont thiamine + supplements  Respiratory: likely aspiration pneumonia, full vent support, place on PS today, NPO after midnight for possible extubation   CV: -160 mmhg, cont labetolol 400 mg q 8 hrs, increase clonidine 0.2 mg q12hr, hydralazine     Endocrine: finger sticks q 6 hrs, hyperglycemia on insulin drip will change to NPH 18 units q 6 hrs, ISS q 4 hrs    Renal:2% @ 50 l/hr + salt tabs 2 g q 8 hrs , Na goal 145-150, BMP q 8 hrs   ID: febrile -  Zosyn and Vanco day 3- renally dosed, growing Coag neg Staph in BCx possibly contaminant, sputum cx was not sent   GI: on TF, diarrhea, protonix, abdominal xray , will send cx for C.diff   Heme/Onc: DVT ppx:  SCD, heparin 5000 units q 12 hrs   Social/Family: ICU  Discharge planning: ICU      Code Status: [x] Full Code [] DNR [] DNI [] Goals of Care:   Disposition: [x] ICU [] Stroke Unit [] RCU []PCU []Floor [] Discharge Home     Patient at high risk for neurologic deterioration, ICH expanding, seizures    critical care time, excluding procedures: 40 minutes

## 2019-02-19 NOTE — DIETITIAN INITIAL EVALUATION ADULT. - ORAL INTAKE PTA
good/Per pt's family, pt with good appetite and PO intake PTA. No micronutrient supplementation. NKFA.

## 2019-02-19 NOTE — DIETITIAN INITIAL EVALUATION ADULT. - NS AS NUTRI INTERV ENTERAL NUTRITION
Recommend increase Glucerna 1.2 to rate of 65 ml/hr x24 hrs. At goal, feed provides 1872 kcal, 94 g protein (21kcal/Kg, 1.04gm protein/Kg current weight 90.2Kg)

## 2019-02-19 NOTE — PROGRESS NOTE ADULT - SUBJECTIVE AND OBJECTIVE BOX
49 yo M with h/o EtOH, presented with Lt BG hemorrhage, went into [facility] - intubated.  No o/n events.    Vital signs, labs, imaging reviewed.      Exam:    General: sedated, intubated.  Neurological:   not EO, not FC, moving L side strongly, 0/5 in the right UE, 1/5 in the right LE . PERRL,  Pulmonary: Clear to Auscultation, No Rales, No Rhonchi, No Wheezes   Cardiovascular: S1, S2, Regular Rate and Rhythm   Gastrointestinal: Soft, Nontender, Nondistended   Extremities: No calf tenderness 47 yo M with h/o EtOH, presented with Lt BG hemorrhage, went into [facility] - intubated.  No o/n events.    O: loose BM   fever     T(C): 37.4 (02-19-19 @ 11:00), Max: 38.5 (02-18-19 @ 19:00)  HR: 87 (02-19-19 @ 11:00) (74 - 96)  BP: --  RR: 21 (02-19-19 @ 11:00) (14 - 31)  SpO2: 100% (02-19-19 @ 11:00) (99% - 100%)  02-18-19 @ 07:01  -  02-19-19 @ 07:00  --------------------------------------------------------  IN: 4466.5 mL / OUT: 3000 mL / NET: 1466.5 mL    02-19-19 @ 07:01  -  02-19-19 @ 12:01  --------------------------------------------------------  IN: 1100 mL / OUT: 0 mL / NET: 1100 mL    acetaminophen   Tablet .. 650 milliGRAM(s) Oral every 6 hours PRN  chlordiazePOXIDE 50 milliGRAM(s) Oral every 12 hours  chlorhexidine 4% Liquid 1 Application(s) Topical <User Schedule>  cloNIDine 0.1 milliGRAM(s) Oral every 12 hours  dexmedetomidine Infusion 0.2 MICROgram(s)/kG/Hr IV Continuous <Continuous>  enoxaparin Injectable 40 milliGRAM(s) SubCutaneous <User Schedule>  famotidine    Tablet 20 milliGRAM(s) Oral two times a day  folic acid 1 milliGRAM(s) Enteral Tube daily  hydrALAZINE 50 milliGRAM(s) Oral every 8 hours  insulin Infusion 3 Unit(s)/Hr IV Continuous <Continuous>  labetalol 400 milliGRAM(s) Enteral Tube every 8 hours  multivitamin 1 Tablet(s) Oral daily  niCARdipine Infusion 5 mG/Hr IV Continuous <Continuous>  oxyCODONE    IR 5 milliGRAM(s) Oral every 4 hours PRN  oxyCODONE    IR 10 milliGRAM(s) Oral every 4 hours PRN  piperacillin/tazobactam IVPB. 3.375 Gram(s) IV Intermittent every 8 hours  sodium chloride 2 Gram(s) Oral every 8 hours  sodium chloride 2% . 1000 milliLiter(s) IV Continuous <Continuous>  thiamine 100 milliGRAM(s) Oral daily  vancomycin  IVPB 1500 milliGRAM(s) IV Intermittent every 12 hours  Mode: AC/ CMV (Assist Control/ Continuous Mandatory Ventilation), RR (machine): 14, TV (machine): 550, FiO2: 40, PEEP: 8, ITime: 1, MAP: 12, PIP: 17      Exam:    General: sedated, intubated.  Neurological:   not EO, not FC, moving L side strongly, 0/5 in the right UE, 1/5 in the right LE . PERRL,  Pulmonary: Clear to Auscultation, No Rales, No Rhonchi, No Wheezes   Cardiovascular: S1, S2, Regular Rate and Rhythm   Gastrointestinal: Soft, Nontender, Nondistended   Extremities: No calf tenderness       LABS:  Na: 149 (02-18 @ 20:19), 150 (02-18 @ 11:06), 145 (02-18 @ 04:56), 145 (02-17 @ 23:44), 146 (02-17 @ 17:58), 147 (02-17 @ 10:20), 148 (02-16 @ 21:13)  K: 3.5 (02-18 @ 20:19), 3.9 (02-18 @ 11:06), 3.9 (02-18 @ 04:56), 3.6 (02-17 @ 23:44), 3.6 (02-17 @ 17:58), 3.8 (02-17 @ 10:20), 3.7 (02-16 @ 21:13)  Cl: 117 (02-18 @ 20:19), 120 (02-18 @ 11:06), 116 (02-18 @ 04:56), 117 (02-17 @ 23:44), 114 (02-17 @ 17:58), 117 (02-17 @ 10:20), 118 (02-16 @ 21:13)  CO2: 21 (02-18 @ 20:19), 18 (02-18 @ 11:06), 19 (02-18 @ 04:56), 17 (02-17 @ 23:44), 21 (02-17 @ 17:58), 19 (02-17 @ 10:20), 19 (02-16 @ 21:13)  BUN: 8 (02-18 @ 20:19), 8 (02-18 @ 11:06), 6 (02-18 @ 04:56), 6 (02-17 @ 23:44), 5 (02-17 @ 17:58), 7 (02-17 @ 10:20), 10 (02-16 @ 21:13)  Cr: 0.74 (02-18 @ 20:19), 0.70 (02-18 @ 11:06), 0.74 (02-18 @ 04:56), 0.70 (02-17 @ 23:44), 0.77 (02-17 @ 17:58), 0.72 (02-17 @ 10:20), 0.92 (02-16 @ 21:13)  Glu: 171(02-18 @ 20:19), 227(02-18 @ 11:06), 207(02-18 @ 04:56), 216(02-17 @ 23:44), 179(02-17 @ 17:58), 216(02-17 @ 10:20), 136(02-16 @ 21:13)    Hgb: 11.6 (02-19 @ 00:21)  Hct: 34.4 (02-19 @ 00:21)  WBC: 5.9 (02-19 @ 00:21)  Plt: 157 (02-19 @ 00:21)    INR:   PTT:         EXAM:  CT BRAIN                            PROCEDURE DATE:  02/18/2019            INTERPRETATION:  CLINICAL INFORMATION: Follow-up left basal ganglia bleed.    TECHNIQUE: Noncontrast axial CT images were acquired through the head.     COMPARISON STUDY: MRI brain from 2/17/2019. Noncontrast head CT  from   2/17/2019.    FINDINGS:     Elevation limited by motion.    Stable appearance of 4.7 x 4.1 cm left basal ganglia hemorrhage with   surrounding edema. Rightward midline shift of 6 mm is similar. Mild   asymmetric enlargement of the right lateral ventricle is similar.    Redilatation of left parotid mass.    IMPRESSION:    Limited by motion.    No significant interval change from 2/17/2019.

## 2019-02-20 PROBLEM — I61.9 NONTRAUMATIC INTRACEREBRAL HEMORRHAGE, UNSPECIFIED: Chronic | Status: ACTIVE | Noted: 2019-02-14

## 2019-02-20 PROBLEM — K11.6 MUCOCELE OF SALIVARY GLAND: Chronic | Status: ACTIVE | Noted: 2019-02-14

## 2019-02-20 PROBLEM — S02.609A FRACTURE OF MANDIBLE, UNSPECIFIED, INITIAL ENCOUNTER FOR CLOSED FRACTURE: Chronic | Status: ACTIVE | Noted: 2019-02-14

## 2019-02-20 PROBLEM — F10.10 ALCOHOL ABUSE, UNCOMPLICATED: Chronic | Status: ACTIVE | Noted: 2019-02-14

## 2019-02-20 LAB
ANION GAP SERPL CALC-SCNC: 13 MMOL/L — SIGNIFICANT CHANGE UP (ref 5–17)
ANION GAP SERPL CALC-SCNC: 13 MMOL/L — SIGNIFICANT CHANGE UP (ref 5–17)
BUN SERPL-MCNC: 10 MG/DL — SIGNIFICANT CHANGE UP (ref 7–23)
BUN SERPL-MCNC: 9 MG/DL — SIGNIFICANT CHANGE UP (ref 7–23)
CALCIUM SERPL-MCNC: 8.8 MG/DL — SIGNIFICANT CHANGE UP (ref 8.4–10.5)
CALCIUM SERPL-MCNC: 9 MG/DL — SIGNIFICANT CHANGE UP (ref 8.4–10.5)
CHLORIDE SERPL-SCNC: 106 MMOL/L — SIGNIFICANT CHANGE UP (ref 96–108)
CHLORIDE SERPL-SCNC: 108 MMOL/L — SIGNIFICANT CHANGE UP (ref 96–108)
CO2 SERPL-SCNC: 20 MMOL/L — LOW (ref 22–31)
CO2 SERPL-SCNC: 21 MMOL/L — LOW (ref 22–31)
CREAT SERPL-MCNC: 0.66 MG/DL — SIGNIFICANT CHANGE UP (ref 0.5–1.3)
CREAT SERPL-MCNC: 0.69 MG/DL — SIGNIFICANT CHANGE UP (ref 0.5–1.3)
GAS PNL BLDA: SIGNIFICANT CHANGE UP
GLUCOSE SERPL-MCNC: 145 MG/DL — HIGH (ref 70–99)
GLUCOSE SERPL-MCNC: 191 MG/DL — HIGH (ref 70–99)
GRAM STN FLD: SIGNIFICANT CHANGE UP
MAGNESIUM SERPL-MCNC: 2.2 MG/DL — SIGNIFICANT CHANGE UP (ref 1.6–2.6)
MAGNESIUM SERPL-MCNC: 2.2 MG/DL — SIGNIFICANT CHANGE UP (ref 1.6–2.6)
PHOSPHATE SERPL-MCNC: 3.6 MG/DL — SIGNIFICANT CHANGE UP (ref 2.5–4.5)
PHOSPHATE SERPL-MCNC: 4 MG/DL — SIGNIFICANT CHANGE UP (ref 2.5–4.5)
POTASSIUM SERPL-MCNC: 3.8 MMOL/L — SIGNIFICANT CHANGE UP (ref 3.5–5.3)
POTASSIUM SERPL-MCNC: 3.8 MMOL/L — SIGNIFICANT CHANGE UP (ref 3.5–5.3)
POTASSIUM SERPL-SCNC: 3.8 MMOL/L — SIGNIFICANT CHANGE UP (ref 3.5–5.3)
POTASSIUM SERPL-SCNC: 3.8 MMOL/L — SIGNIFICANT CHANGE UP (ref 3.5–5.3)
SODIUM SERPL-SCNC: 139 MMOL/L — SIGNIFICANT CHANGE UP (ref 135–145)
SODIUM SERPL-SCNC: 142 MMOL/L — SIGNIFICANT CHANGE UP (ref 135–145)
SPECIMEN SOURCE: SIGNIFICANT CHANGE UP

## 2019-02-20 PROCEDURE — 99291 CRITICAL CARE FIRST HOUR: CPT

## 2019-02-20 PROCEDURE — 99292 CRITICAL CARE ADDL 30 MIN: CPT

## 2019-02-20 PROCEDURE — 71045 X-RAY EXAM CHEST 1 VIEW: CPT | Mod: 26

## 2019-02-20 RX ORDER — POTASSIUM CHLORIDE 20 MEQ
20 PACKET (EA) ORAL ONCE
Qty: 0 | Refills: 0 | Status: COMPLETED | OUTPATIENT
Start: 2019-02-20 | End: 2019-02-21

## 2019-02-20 RX ORDER — POTASSIUM CHLORIDE 20 MEQ
40 PACKET (EA) ORAL ONCE
Qty: 0 | Refills: 0 | Status: COMPLETED | OUTPATIENT
Start: 2019-02-20 | End: 2019-02-20

## 2019-02-20 RX ORDER — DEXTROSE MONOHYDRATE, SODIUM CHLORIDE, AND POTASSIUM CHLORIDE 50; .745; 4.5 G/1000ML; G/1000ML; G/1000ML
1000 INJECTION, SOLUTION INTRAVENOUS
Qty: 0 | Refills: 0 | Status: DISCONTINUED | OUTPATIENT
Start: 2019-02-20 | End: 2019-02-22

## 2019-02-20 RX ORDER — HYDRALAZINE HCL 50 MG
100 TABLET ORAL EVERY 8 HOURS
Qty: 0 | Refills: 0 | Status: DISCONTINUED | OUTPATIENT
Start: 2019-02-20 | End: 2019-02-25

## 2019-02-20 RX ADMIN — Medication 100 MILLIGRAM(S): at 21:11

## 2019-02-20 RX ADMIN — SODIUM CHLORIDE 2 GRAM(S): 9 INJECTION INTRAMUSCULAR; INTRAVENOUS; SUBCUTANEOUS at 13:05

## 2019-02-20 RX ADMIN — SODIUM CHLORIDE 2 GRAM(S): 9 INJECTION INTRAMUSCULAR; INTRAVENOUS; SUBCUTANEOUS at 21:11

## 2019-02-20 RX ADMIN — Medication 50 MILLIGRAM(S): at 05:21

## 2019-02-20 RX ADMIN — PIPERACILLIN AND TAZOBACTAM 25 GRAM(S): 4; .5 INJECTION, POWDER, LYOPHILIZED, FOR SOLUTION INTRAVENOUS at 13:04

## 2019-02-20 RX ADMIN — Medication 166.67 MILLIGRAM(S): at 05:24

## 2019-02-20 RX ADMIN — ENOXAPARIN SODIUM 40 MILLIGRAM(S): 100 INJECTION SUBCUTANEOUS at 17:45

## 2019-02-20 RX ADMIN — Medication 650 MILLIGRAM(S): at 23:27

## 2019-02-20 RX ADMIN — Medication 400 MILLIGRAM(S): at 13:04

## 2019-02-20 RX ADMIN — SODIUM CHLORIDE 2 GRAM(S): 9 INJECTION INTRAMUSCULAR; INTRAVENOUS; SUBCUTANEOUS at 05:22

## 2019-02-20 RX ADMIN — Medication 0.1 MILLIGRAM(S): at 17:46

## 2019-02-20 RX ADMIN — Medication 100 MILLIGRAM(S): at 13:04

## 2019-02-20 RX ADMIN — FENTANYL CITRATE 25 MICROGRAM(S): 50 INJECTION INTRAVENOUS at 03:57

## 2019-02-20 RX ADMIN — Medication 50 MILLIGRAM(S): at 05:22

## 2019-02-20 RX ADMIN — PIPERACILLIN AND TAZOBACTAM 25 GRAM(S): 4; .5 INJECTION, POWDER, LYOPHILIZED, FOR SOLUTION INTRAVENOUS at 22:29

## 2019-02-20 RX ADMIN — PIPERACILLIN AND TAZOBACTAM 25 GRAM(S): 4; .5 INJECTION, POWDER, LYOPHILIZED, FOR SOLUTION INTRAVENOUS at 05:22

## 2019-02-20 RX ADMIN — Medication 400 MILLIGRAM(S): at 05:24

## 2019-02-20 RX ADMIN — FAMOTIDINE 20 MILLIGRAM(S): 10 INJECTION INTRAVENOUS at 05:22

## 2019-02-20 RX ADMIN — Medication 25 MILLIGRAM(S): at 17:46

## 2019-02-20 RX ADMIN — CHLORHEXIDINE GLUCONATE 1 APPLICATION(S): 213 SOLUTION TOPICAL at 21:11

## 2019-02-20 RX ADMIN — Medication 1 MILLIGRAM(S): at 13:04

## 2019-02-20 RX ADMIN — FENTANYL CITRATE 25 MICROGRAM(S): 50 INJECTION INTRAVENOUS at 03:37

## 2019-02-20 RX ADMIN — FAMOTIDINE 20 MILLIGRAM(S): 10 INJECTION INTRAVENOUS at 17:45

## 2019-02-20 RX ADMIN — Medication 0.2 MILLIGRAM(S): at 05:22

## 2019-02-20 RX ADMIN — Medication 100 MILLIGRAM(S): at 14:32

## 2019-02-20 RX ADMIN — Medication 40 MILLIEQUIVALENT(S): at 00:22

## 2019-02-20 RX ADMIN — Medication 400 MILLIGRAM(S): at 22:29

## 2019-02-20 NOTE — PROGRESS NOTE ADULT - ASSESSMENT
Left basal ganglia hemorrhage  - CPAP and wean to extubate  - -160mmHg on clonidine, labetalol and hydralazine  - DMII: NPH  - EtOH: librium taper, thiamine, folic acid, MVI  - PNA: ABx until 2/21    40 minutes critical care time

## 2019-02-20 NOTE — CHART NOTE - NSCHARTNOTEFT_GEN_A_CORE
Nutrition Follow Up Note      Source: medical record, PA, pharmacist     Diet : NPO    Patient seen for verbal nutrition consult for formula change. Medical chart reviewed/events noted.  Per chart pt with h/o EtOH abuse (5 beers daily), presented with left basal ganglia ICH with cerebral edema, most likely HTN etiology. Pt currently NPO, was having diarrhea with Glucerna tube feed yesterday. Plan to resume tube feeds today, recommendations noted below and discussed with PA.       Daily Weight in k.2 (-19), Weight in k.2 (-), Weight in k.5 (-15)      Pertinent Medications: MEDICATIONS  (STANDING):  chlordiazePOXIDE   Oral   chlordiazePOXIDE 25 milliGRAM(s) Oral every 12 hours  chlorhexidine 4% Liquid 1 Application(s) Topical <User Schedule>  cloNIDine 0.1 milliGRAM(s) Oral every 12 hours  dextrose 5%. 1000 milliLiter(s) (50 mL/Hr) IV Continuous <Continuous>  dextrose 50% Injectable 12.5 Gram(s) IV Push once  dextrose 50% Injectable 25 Gram(s) IV Push once  dextrose 50% Injectable 25 Gram(s) IV Push once  enoxaparin Injectable 40 milliGRAM(s) SubCutaneous <User Schedule>  famotidine    Tablet 20 milliGRAM(s) Oral two times a day  folic acid 1 milliGRAM(s) Enteral Tube daily  hydrALAZINE 100 milliGRAM(s) Oral every 8 hours  insulin lispro (HumaLOG) corrective regimen sliding scale   SubCutaneous every 6 hours  insulin NPH human recombinant 18 Unit(s) SubCutaneous every 6 hours  labetalol 400 milliGRAM(s) Enteral Tube every 8 hours  piperacillin/tazobactam IVPB. 3.375 Gram(s) IV Intermittent every 8 hours  sodium chloride 2 Gram(s) Oral every 8 hours  thiamine 100 milliGRAM(s) Oral daily    MEDICATIONS  (PRN):  acetaminophen   Tablet .. 650 milliGRAM(s) Oral every 6 hours PRN Temp greater or equal to 38C (100.4F), Mild Pain (1 - 3)  dextrose 40% Gel 15 Gram(s) Oral once PRN Blood Glucose LESS THAN 70 milliGRAM(s)/deciLiter  fentaNYL    Injectable 25 MICROGram(s) IV Push every 2 hours PRN agitaion  glucagon  Injectable 1 milliGRAM(s) IntraMuscular once PRN Glucose <70 milliGRAM(s)/deciLiter  oxyCODONE    IR 5 milliGRAM(s) Oral every 4 hours PRN Moderate Pain (4 - 6)  oxyCODONE    IR 10 milliGRAM(s) Oral every 4 hours PRN Severe Pain (7 - 10)    Pertinent Labs:  @ 22:32: Na 150<H>, BUN 10, Cr 0.68, <H>, K+ 3.7, Phos 4.0, Mg 2.2, Alk Phos --, ALT/SGPT --, AST/SGOT --, HbA1c --    Finger Sticks:  POCT Blood Glucose.: 126 mg/dL ( @ 12:17)  POCT Blood Glucose.: 121 mg/dL ( @ 06:01)  POCT Blood Glucose.: 152 mg/dL ( @ 23:40)  POCT Blood Glucose.: 137 mg/dL ( @ 21:23)  POCT Blood Glucose.: 147 mg/dL ( @ 17:41)  POCT Blood Glucose.: 177 mg/dL ( @ 16:05)  POCT Blood Glucose.: 100 mg/dL ( @ 14:10)      Skin per nursing documentation: no pressure ulcers  Edema: +1 generalized edema     Estimated Needs:   [X ] no change since previous assessment  [ ] recalculated:     Previous Nutrition Diagnosis:  Increased nutrient needs (specify); protein  Nutrition Diagnosis is: ongoing, to be addressed with enteral nutrition     New Nutrition Diagnosis: N/A         Recommend  1) Formula change to Vital 1.2 to promote GI tolerance. Recommend Vital 1.2 to goal of 65ml/hr to provide 1872kcal, 117gm protein (21kcal/Kg, 1.3gm protein/kg current weight 90.2Kg). RD to continue to monitor tolerance/need to increase rate if pt extubated   2) Continue to provide 2 Danactive daily    Monitoring and Evaluation:     Continue to monitor Nutritional intake, Tolerance to diet prescription, weights, labs, skin integrity    RD remains available upon request and will follow up per protocol      Fany Hawthorne RD pager #[phone] Nutrition Follow Up Note      Source: medical record, PA, pharmacist     Diet : NPO    Patient seen for verbal nutrition consult for formula change. Medical chart reviewed/events noted.  Per chart pt with h/o EtOH abuse (5 beers daily), presented with left basal ganglia ICH with cerebral edema, most likely HTN etiology. Pt currently NPO, was having diarrhea with Glucerna tube feed yesterday. C diff negative. Plan to resume tube feeds today, recommendations noted below and discussed with PA.       Daily Weight in k.2 (19), Weight in k.2 (-), Weight in k.5 (02-15)      Pertinent Medications: MEDICATIONS  (STANDING):  chlordiazePOXIDE   Oral   chlordiazePOXIDE 25 milliGRAM(s) Oral every 12 hours  chlorhexidine 4% Liquid 1 Application(s) Topical <User Schedule>  cloNIDine 0.1 milliGRAM(s) Oral every 12 hours  dextrose 5%. 1000 milliLiter(s) (50 mL/Hr) IV Continuous <Continuous>  dextrose 50% Injectable 12.5 Gram(s) IV Push once  dextrose 50% Injectable 25 Gram(s) IV Push once  dextrose 50% Injectable 25 Gram(s) IV Push once  enoxaparin Injectable 40 milliGRAM(s) SubCutaneous <User Schedule>  famotidine    Tablet 20 milliGRAM(s) Oral two times a day  folic acid 1 milliGRAM(s) Enteral Tube daily  hydrALAZINE 100 milliGRAM(s) Oral every 8 hours  insulin lispro (HumaLOG) corrective regimen sliding scale   SubCutaneous every 6 hours  insulin NPH human recombinant 18 Unit(s) SubCutaneous every 6 hours  labetalol 400 milliGRAM(s) Enteral Tube every 8 hours  piperacillin/tazobactam IVPB. 3.375 Gram(s) IV Intermittent every 8 hours  sodium chloride 2 Gram(s) Oral every 8 hours  thiamine 100 milliGRAM(s) Oral daily    MEDICATIONS  (PRN):  acetaminophen   Tablet .. 650 milliGRAM(s) Oral every 6 hours PRN Temp greater or equal to 38C (100.4F), Mild Pain (1 - 3)  dextrose 40% Gel 15 Gram(s) Oral once PRN Blood Glucose LESS THAN 70 milliGRAM(s)/deciLiter  fentaNYL    Injectable 25 MICROGram(s) IV Push every 2 hours PRN agitaion  glucagon  Injectable 1 milliGRAM(s) IntraMuscular once PRN Glucose <70 milliGRAM(s)/deciLiter  oxyCODONE    IR 5 milliGRAM(s) Oral every 4 hours PRN Moderate Pain (4 - 6)  oxyCODONE    IR 10 milliGRAM(s) Oral every 4 hours PRN Severe Pain (7 - 10)    Pertinent Labs:  @ 22:32: Na 150<H>, BUN 10, Cr 0.68, <H>, K+ 3.7, Phos 4.0, Mg 2.2, Alk Phos --, ALT/SGPT --, AST/SGOT --, HbA1c --    Finger Sticks:  POCT Blood Glucose.: 126 mg/dL ( @ 12:17)  POCT Blood Glucose.: 121 mg/dL ( @ 06:01)  POCT Blood Glucose.: 152 mg/dL ( @ 23:40)  POCT Blood Glucose.: 137 mg/dL ( @ 21:23)  POCT Blood Glucose.: 147 mg/dL ( @ 17:41)  POCT Blood Glucose.: 177 mg/dL ( @ 16:05)  POCT Blood Glucose.: 100 mg/dL ( @ 14:10)      Skin per nursing documentation: no pressure ulcers  Edema: +1 generalized edema     Estimated Needs:   [X ] no change since previous assessment  [ ] recalculated:     Previous Nutrition Diagnosis:  Increased nutrient needs (specify); protein  Nutrition Diagnosis is: ongoing, to be addressed with enteral nutrition     New Nutrition Diagnosis: N/A         Recommend  1) Formula change to Vital 1.2 to promote GI tolerance. Recommend Vital 1.2 to goal of 65ml/hr to provide 1872kcal, 117gm protein (21kcal/Kg, 1.3gm protein/kg current weight 90.2Kg). RD to continue to monitor tolerance/need to increase rate if pt extubated   2) Continue to provide 2 Danactive daily  3) Continue thiamine and folic acid  Monitoring and Evaluation:     Continue to monitor Nutritional intake, Tolerance to diet prescription, weights, labs, skin integrity    RD remains available upon request and will follow up per protocol      Fany Hawthorne RD pager #[phone]

## 2019-02-20 NOTE — PROGRESS NOTE ADULT - SUBJECTIVE AND OBJECTIVE BOX
CPAPed during day but deemed by day team not ready for extubation.    No eye opening, PERRL, no commands, spontaneous and strong on the left, RUE flexion, RLE TF

## 2019-02-20 NOTE — PROGRESS NOTE ADULT - ASSESSMENT
A/P:  Left BG ICH, CTA no vascular malformation, most likley HTN etiology.  ICH score 1  EtOH withdrawal.  Acute hypoxic resp distress.    Neuro:   CTh stable  alcohol withdrawal: he is less agitated today, continue librium taper  On Precedex, will discontinue it   cont thiamine + supplements  Respiratory: likely aspiration pneumonia; PRVC  pressure support as tolerated, poor mental status, would hold extubation now, reassess  CV: -160 mmhg, maximize hydralazine and labetalol, decrease clonidine  Endocrine: maintain euglycemia, NPH+ROZ  Renal:2% @ 50 l/hr + salt tabs 2 g q 8 hrs , Na goal 145-150, BMP q 8 hrs   ID: cont zosyn for asp pna x7d  GI: on TF, diarrhea, protonix, abdominal xray , will send cx for C.diff --negative  Heme/Onc: DVT ppx:  SCD, heparin 5000 units q 12 hrs   Social/Family: ICU  Discharge planning: ICU      Code Status: [x] Full Code [] DNR [] DNI [] Goals of Care:   Disposition: [x] ICU [] Stroke Unit [] RCU []PCU []Floor [] Discharge Home   family updated at bedside    Patient at high risk for neurologic deterioration, ICH expanding, seizures    critical care time, excluding procedures: 45 minutes

## 2019-02-20 NOTE — PROGRESS NOTE ADULT - SUBJECTIVE AND OBJECTIVE BOX
49 yo M with h/o EtOH, presented with Lt BG hemorrhage, went into [facility] - intubated.  No o/n events.    loose BM, cdiff negative    Vitals/labs/meds/imaging reviewed    Exam:    General: intubated.  Neurological:   not EO, no blink to threat, not FC, moving L side strongly, 0/5 in the right UE, 1/5 in the right LE . PERRL,  Pulmonary: Clear to Auscultation, No Rales, No Rhonchi, No Wheezes   Cardiovascular: S1, S2, Regular Rate and Rhythm   Gastrointestinal: Soft, Nontender, Nondistended   Extremities: No calf tenderness

## 2019-02-21 LAB
ALBUMIN SERPL ELPH-MCNC: 3.1 G/DL — LOW (ref 3.3–5)
ALP SERPL-CCNC: 50 U/L — SIGNIFICANT CHANGE UP (ref 40–120)
ALT FLD-CCNC: 35 U/L — SIGNIFICANT CHANGE UP (ref 10–45)
AMMONIA BLD-MCNC: 47 UMOL/L — SIGNIFICANT CHANGE UP (ref 11–55)
ANION GAP SERPL CALC-SCNC: 12 MMOL/L — SIGNIFICANT CHANGE UP (ref 5–17)
ANION GAP SERPL CALC-SCNC: 12 MMOL/L — SIGNIFICANT CHANGE UP (ref 5–17)
AST SERPL-CCNC: 21 U/L — SIGNIFICANT CHANGE UP (ref 10–40)
BASE EXCESS BLDA CALC-SCNC: -0.3 MMOL/L — SIGNIFICANT CHANGE UP (ref -2–2)
BASE EXCESS BLDA CALC-SCNC: 1.6 MMOL/L — SIGNIFICANT CHANGE UP (ref -2–2)
BILIRUB DIRECT SERPL-MCNC: 0.2 MG/DL — SIGNIFICANT CHANGE UP (ref 0–0.2)
BILIRUB INDIRECT FLD-MCNC: 0.3 MG/DL — SIGNIFICANT CHANGE UP (ref 0.2–1)
BILIRUB SERPL-MCNC: 0.5 MG/DL — SIGNIFICANT CHANGE UP (ref 0.2–1.2)
BUN SERPL-MCNC: 10 MG/DL — SIGNIFICANT CHANGE UP (ref 7–23)
BUN SERPL-MCNC: 12 MG/DL — SIGNIFICANT CHANGE UP (ref 7–23)
CALCIUM SERPL-MCNC: 8.6 MG/DL — SIGNIFICANT CHANGE UP (ref 8.4–10.5)
CALCIUM SERPL-MCNC: 9 MG/DL — SIGNIFICANT CHANGE UP (ref 8.4–10.5)
CHLORIDE SERPL-SCNC: 107 MMOL/L — SIGNIFICANT CHANGE UP (ref 96–108)
CHLORIDE SERPL-SCNC: 110 MMOL/L — HIGH (ref 96–108)
CO2 BLDA-SCNC: 24 MMOL/L — SIGNIFICANT CHANGE UP (ref 22–30)
CO2 BLDA-SCNC: 26 MMOL/L — SIGNIFICANT CHANGE UP (ref 22–30)
CO2 SERPL-SCNC: 20 MMOL/L — LOW (ref 22–31)
CO2 SERPL-SCNC: 21 MMOL/L — LOW (ref 22–31)
CREAT SERPL-MCNC: 0.67 MG/DL — SIGNIFICANT CHANGE UP (ref 0.5–1.3)
CREAT SERPL-MCNC: 0.68 MG/DL — SIGNIFICANT CHANGE UP (ref 0.5–1.3)
CULTURE RESULTS: SIGNIFICANT CHANGE UP
CULTURE RESULTS: SIGNIFICANT CHANGE UP
GAS PNL BLDA: SIGNIFICANT CHANGE UP
GAS PNL BLDA: SIGNIFICANT CHANGE UP
GLUCOSE SERPL-MCNC: 127 MG/DL — HIGH (ref 70–99)
GLUCOSE SERPL-MCNC: 164 MG/DL — HIGH (ref 70–99)
HCO3 BLDA-SCNC: 23 MMOL/L — SIGNIFICANT CHANGE UP (ref 21–29)
HCO3 BLDA-SCNC: 25 MMOL/L — SIGNIFICANT CHANGE UP (ref 21–29)
HCT VFR BLD CALC: 32.7 % — LOW (ref 39–50)
HGB BLD-MCNC: 11.4 G/DL — LOW (ref 13–17)
HOROWITZ INDEX BLDA+IHG-RTO: 40 — SIGNIFICANT CHANGE UP
HOROWITZ INDEX BLDA+IHG-RTO: 40 — SIGNIFICANT CHANGE UP
MAGNESIUM SERPL-MCNC: 2.4 MG/DL — SIGNIFICANT CHANGE UP (ref 1.6–2.6)
MCHC RBC-ENTMCNC: 32.5 PG — SIGNIFICANT CHANGE UP (ref 27–34)
MCHC RBC-ENTMCNC: 34.8 GM/DL — SIGNIFICANT CHANGE UP (ref 32–36)
MCV RBC AUTO: 93.2 FL — SIGNIFICANT CHANGE UP (ref 80–100)
PCO2 BLDA: 33 MMHG — SIGNIFICANT CHANGE UP (ref 32–46)
PCO2 BLDA: 34 MMHG — SIGNIFICANT CHANGE UP (ref 32–46)
PH BLDA: 7.45 — SIGNIFICANT CHANGE UP (ref 7.35–7.45)
PH BLDA: 7.47 — HIGH (ref 7.35–7.45)
PHOSPHATE SERPL-MCNC: 2.2 MG/DL — LOW (ref 2.5–4.5)
PLATELET # BLD AUTO: 207 K/UL — SIGNIFICANT CHANGE UP (ref 150–400)
PO2 BLDA: 143 MMHG — HIGH (ref 74–108)
PO2 BLDA: 158 MMHG — HIGH (ref 74–108)
POTASSIUM SERPL-MCNC: 3.7 MMOL/L — SIGNIFICANT CHANGE UP (ref 3.5–5.3)
POTASSIUM SERPL-MCNC: 4 MMOL/L — SIGNIFICANT CHANGE UP (ref 3.5–5.3)
POTASSIUM SERPL-SCNC: 3.7 MMOL/L — SIGNIFICANT CHANGE UP (ref 3.5–5.3)
POTASSIUM SERPL-SCNC: 4 MMOL/L — SIGNIFICANT CHANGE UP (ref 3.5–5.3)
PROT SERPL-MCNC: 6.3 G/DL — SIGNIFICANT CHANGE UP (ref 6–8.3)
RBC # BLD: 3.51 M/UL — LOW (ref 4.2–5.8)
RBC # FLD: 12.2 % — SIGNIFICANT CHANGE UP (ref 10.3–14.5)
SAO2 % BLDA: 100 % — HIGH (ref 92–96)
SAO2 % BLDA: 99 % — HIGH (ref 92–96)
SODIUM SERPL-SCNC: 140 MMOL/L — SIGNIFICANT CHANGE UP (ref 135–145)
SODIUM SERPL-SCNC: 142 MMOL/L — SIGNIFICANT CHANGE UP (ref 135–145)
SPECIMEN SOURCE: SIGNIFICANT CHANGE UP
SPECIMEN SOURCE: SIGNIFICANT CHANGE UP
WBC # BLD: 7.5 K/UL — SIGNIFICANT CHANGE UP (ref 3.8–10.5)
WBC # FLD AUTO: 7.5 K/UL — SIGNIFICANT CHANGE UP (ref 3.8–10.5)

## 2019-02-21 PROCEDURE — 93971 EXTREMITY STUDY: CPT | Mod: 26

## 2019-02-21 PROCEDURE — 99292 CRITICAL CARE ADDL 30 MIN: CPT

## 2019-02-21 PROCEDURE — 70450 CT HEAD/BRAIN W/O DYE: CPT | Mod: 26

## 2019-02-21 PROCEDURE — 71045 X-RAY EXAM CHEST 1 VIEW: CPT | Mod: 26

## 2019-02-21 PROCEDURE — 95819 EEG AWAKE AND ASLEEP: CPT | Mod: 26,59

## 2019-02-21 PROCEDURE — 95951: CPT | Mod: 26

## 2019-02-21 PROCEDURE — 99291 CRITICAL CARE FIRST HOUR: CPT

## 2019-02-21 RX ORDER — LISINOPRIL 2.5 MG/1
20 TABLET ORAL DAILY
Qty: 0 | Refills: 0 | Status: DISCONTINUED | OUTPATIENT
Start: 2019-02-21 | End: 2019-02-22

## 2019-02-21 RX ADMIN — Medication 100 MILLIGRAM(S): at 05:01

## 2019-02-21 RX ADMIN — SODIUM CHLORIDE 2 GRAM(S): 9 INJECTION INTRAMUSCULAR; INTRAVENOUS; SUBCUTANEOUS at 05:01

## 2019-02-21 RX ADMIN — HUMAN INSULIN 18 UNIT(S): 100 INJECTION, SUSPENSION SUBCUTANEOUS at 12:08

## 2019-02-21 RX ADMIN — DEXTROSE MONOHYDRATE, SODIUM CHLORIDE, AND POTASSIUM CHLORIDE 75 MILLILITER(S): 50; .745; 4.5 INJECTION, SOLUTION INTRAVENOUS at 22:52

## 2019-02-21 RX ADMIN — Medication 0.1 MILLIGRAM(S): at 05:01

## 2019-02-21 RX ADMIN — Medication 400 MILLIGRAM(S): at 05:48

## 2019-02-21 RX ADMIN — Medication 650 MILLIGRAM(S): at 00:27

## 2019-02-21 RX ADMIN — SODIUM CHLORIDE 2 GRAM(S): 9 INJECTION INTRAMUSCULAR; INTRAVENOUS; SUBCUTANEOUS at 13:27

## 2019-02-21 RX ADMIN — HUMAN INSULIN 18 UNIT(S): 100 INJECTION, SUSPENSION SUBCUTANEOUS at 17:52

## 2019-02-21 RX ADMIN — Medication 2: at 12:08

## 2019-02-21 RX ADMIN — ENOXAPARIN SODIUM 40 MILLIGRAM(S): 100 INJECTION SUBCUTANEOUS at 17:51

## 2019-02-21 RX ADMIN — Medication 2: at 05:48

## 2019-02-21 RX ADMIN — DEXTROSE MONOHYDRATE, SODIUM CHLORIDE, AND POTASSIUM CHLORIDE 75 MILLILITER(S): 50; .745; 4.5 INJECTION, SOLUTION INTRAVENOUS at 07:00

## 2019-02-21 RX ADMIN — Medication 20 MILLIEQUIVALENT(S): at 05:01

## 2019-02-21 RX ADMIN — Medication 25 MILLIGRAM(S): at 05:02

## 2019-02-21 RX ADMIN — Medication 100 MILLIGRAM(S): at 13:27

## 2019-02-21 RX ADMIN — SODIUM CHLORIDE 2 GRAM(S): 9 INJECTION INTRAMUSCULAR; INTRAVENOUS; SUBCUTANEOUS at 22:51

## 2019-02-21 RX ADMIN — FAMOTIDINE 20 MILLIGRAM(S): 10 INJECTION INTRAVENOUS at 17:51

## 2019-02-21 RX ADMIN — Medication 1 MILLIGRAM(S): at 12:16

## 2019-02-21 RX ADMIN — Medication 400 MILLIGRAM(S): at 13:27

## 2019-02-21 RX ADMIN — Medication 2: at 17:51

## 2019-02-21 RX ADMIN — Medication 2: at 00:06

## 2019-02-21 RX ADMIN — FAMOTIDINE 20 MILLIGRAM(S): 10 INJECTION INTRAVENOUS at 05:01

## 2019-02-21 RX ADMIN — Medication 100 MILLIGRAM(S): at 12:16

## 2019-02-21 RX ADMIN — PIPERACILLIN AND TAZOBACTAM 25 GRAM(S): 4; .5 INJECTION, POWDER, LYOPHILIZED, FOR SOLUTION INTRAVENOUS at 05:00

## 2019-02-21 RX ADMIN — Medication 100 MILLIGRAM(S): at 22:52

## 2019-02-21 NOTE — PROGRESS NOTE ADULT - SUBJECTIVE AND OBJECTIVE BOX
Librium and clonidine d/c'd due to poor mental status.     No eye opening, PERRL, no commands, spontaneous and strong on the left, RUE flexion, RLE TF

## 2019-02-21 NOTE — PROGRESS NOTE ADULT - ASSESSMENT
A/P:  Left BG ICH, CTA no vascular malformation, most likley HTN etiology.  ICH score 1  EtOH withdrawal.  Acute hypoxic resp distress.    Neuro:   off sedation  alcohol withdrawal: continue librium taper  cont thiamine + supplements  Respiratory: likely aspiration pneumonia; PRVC - CPAP, not waking up, will monitor  pressure support as tolerated, poor mental status, would hold extubation now, reassess  CV: -160 mmhg,on hydralazine and labetalol, d/c clonidine  Endocrine: maintain euglycemia, NPH+ROZ  Renal:2% @ 50 l/hr + salt tabs 2 g q 8 hrs , Na goal 145-150, BMP q 8 hrs   ID: cont zosyn for asp pna x7d  GI: on TF, diarrhea, protonix, abdominal xray , will send cx for C.diff --negative  Heme/Onc: DVT ppx:  SCD, heparin 5000 units q 12 hrs   Social/Family: ICU  Discharge planning: ICU      Code Status: [x] Full Code [] DNR [] DNI [] Goals of Care:   Disposition: [x] ICU [] Stroke Unit [] RCU []PCU []Floor [] Discharge Home   family updated at bedside    Patient at high risk for neurologic deterioration, ICH expanding, seizures    critical care time, excluding procedures: 45 minutes A/P:  Left BG ICH, CTA no vascular malformation, most likley HTN etiology.  ICH score 1  EtOH withdrawal.  Acute hypoxic resp distress.    Neuro:   off sedation  alcohol withdrawal: d/c librium  EEG  get ammonia, LFT  cont thiamine + supplements  Respiratory: likely aspiration pneumonia; PRVC - CPAP, not waking up, will monitor  pressure support as tolerated, poor mental status, would hold extubation now, reassess  CV: -160 mmhg,on hydralazine and labetalol, d/c clonidine  Endocrine: maintain euglycemia, NPH+ROZ  Renal: NS @ 75 l/hr + salt tabs 2 g q 8 hrs , Na goal 145-150, BMP now   ID: cont zosyn for asp pna x7d  GI: on TF, diarrhea, protonix, abdominal xray , will send cx for C.diff --negative  Heme/Onc: DVT ppx:  SCD, heparin 5000 units q 12 hrs   Social/Family: ICU  Discharge planning: ICU      Code Status: [x] Full Code [] DNR [] DNI [] Goals of Care:   Disposition: [x] ICU [] Stroke Unit [] RCU []PCU []Floor [] Discharge Home   family updated at bedside    Patient at high risk for neurologic deterioration, ICH expanding, seizures    critical care time, excluding procedures: 45 minutes A/P:  Left BG ICH, CTA no vascular malformation, most likley HTN etiology.  ICH score 1  EtOH withdrawal.  Acute hypoxic resp distress.    Neuro:   off sedation  alcohol withdrawal: d/c librium  EEG  get ammonia, LFT  cont thiamine + supplements  Respiratory: likely aspiration pneumonia; PRVC - CPAP, not waking up, will monitor  pressure support as tolerated, poor mental status, would hold extubation now, reassess  CV: -160 mmhg,on hydralazine and labetalol, d/c clonidine  Endocrine: maintain euglycemia, NPH+ROZ  Renal: NS @ 75 l/hr + salt tabs 2 g q 8 hrs , Na goal 145-150, BMP now   ID: cont zosyn for asp pna x7d - complete today  GI: on TF, diarrhea, protonix, abdominal xray , will send cx for C.diff --negative  Heme/Onc: DVT ppx:  SCD, heparin 5000 units q 12 hrs; RUE Doppler  Social/Family: ICU  Discharge planning: ICU      Code Status: [x] Full Code [] DNR [] DNI [] Goals of Care:   Disposition: [x] ICU [] Stroke Unit [] RCU []PCU []Floor [] Discharge Home   family updated at bedside    Patient at high risk for neurologic deterioration, ICH expanding, seizures    critical care time, excluding procedures: 45 minutes

## 2019-02-21 NOTE — PROGRESS NOTE ADULT - ASSESSMENT
Left basal ganglia hemorrhage  - -160mmHg on clonidine, labetalol and hydralazine  - DMII: NPH  - EtOH: thiamine, folic acid, MVI  - Vent support, CPAP but may need trach/PEG  - VTE prophylaxis     40 minutes critical care time

## 2019-02-21 NOTE — PROGRESS NOTE ADULT - SUBJECTIVE AND OBJECTIVE BOX
47 yo M with h/o EtOH, presented with Lt BG hemorrhage, went into [facility]dr[facility] - intubated.  No o/n events.    No o/n events.    Vitals/labs/meds/imaging reviewed    Exam:    General: intubated.  Neurological:   not EO, no blink to threat, not FC, moving L side strongly, 0/5 in the right UE, 1/5 in the right LE . PERRL,  Pulmonary: Clear to Auscultation, No Rales, No Rhonchi, No Wheezes   Cardiovascular: S1, S2, Regular Rate and Rhythm   Gastrointestinal: Soft, Nontender, Nondistended   Extremities: No calf tenderness 47 yo M with h/o EtOH, presented with Lt BG hemorrhage, went into wdrl - intubated.  No o/n events.    No o/n events.    Vitals/labs/meds/imaging reviewed    Exam:    General: intubated.  Neurological:   not EO, no blink to threat, not FC, moving L side less strongly than yesterday, wdrl in the right UE, TF in the right LE . PERRL,  Pulmonary: Clear to Auscultation, No Rales, No Rhonchi, No Wheezes   Cardiovascular: S1, S2, Regular Rate and Rhythm   Gastrointestinal: Soft, Nontender, Nondistended   Extremities: No calf tenderness

## 2019-02-21 NOTE — EEG REPORT - NS EEG TEXT BOX
[facility]   COMPREHENSIVE EPILEPSY CENTER   REPORT OF ROUTINE VIDEO EEG     [facility]: [address] [place] [address] , [address], [place] [address], Ph#: [phone]  [facility]: [address], [place] [address], Ph#: [phone]  Office: 1 [facility], [facility] [address], [place] [address] Ph#: [phone]    Patient Name: JAVIER RUEDA  Age and : 48y (74)  MRN #: [id number]  Location: [facility] 14  Referring Physician: Thais Cai    Study Date: 19    _____________________________________________________________  TECHNICAL INFORMATION    Placement and Labeling of Electrodes:  The EEG was performed utilizing 20 channels referential EEG connections (coronal over temporal over parasagittal montage) using all standard 10-20 electrode placements with EKG.  Recording was at a sampling rate of 256 samples per second per channel.  Time synchronized digital video recording was done simultaneously with EEG recording.  A low light infrared camera was used for low light recording.  Greyson and seizure detection algorithms were utilized.    _____________________________________________________________  HISTORY    Patient is a 48y old  Male who presents with a chief complaint of Left basal ganglia hemorrhage (2019 08:17)      PERTINENT MEDICATION:  None  _____________________________________________________________  STUDY INTERPRETATION    Findings: The background was continuous, spontaneously variable and reactive. Background predominantly consisted of theta, delta and faster activities. No posterior dominant rhythm seen.    Background Slowing:  Diffuse theta and polymorphic delta slowing.    Focal Slowing:   None were present.    Sleep Background:  Drowsiness and stage II sleep transients were not recorded.    Other Non-Epileptiform Findings:  None were present.    Interictal Epileptiform Activity:   None were present.    Events:  Clinical events: None recorded.  Seizures: None recorded.    Activation Procedures:   Hyperventilation was not performed.    Photic stimulation was not performed.     Artifacts:  Intermittent myogenic and movement artifacts were noted.    ECG:  The heart rate on single channel ECG was predominantly between 60-70 BPM.    _____________________________________________________________  EEG SUMMARY/CLASSIFICATION    Abnormal EEG in an unresponsive patient.  - Moderate to severe generalized slowing.    _____________________________________________________________  EEG IMPRESSION/CLINICAL CORRELATE    Abnormal EEG study.  1. Moderate to severe nonspecific diffuse cerebral dysfunction.   No epileptiform pattern or seizure seen.    _____________________________________________________________  ***PRELIM FELLOW READ*** INCOMPLETE NOTE    Kathleen Ferreira MD  Epilepsy Fellow    Adam Gutierrez MD  Attending Physician, [facility] Epilepsy [place] [facility]   COMPREHENSIVE EPILEPSY CENTER   REPORT OF ROUTINE VIDEO EEG     [facility]: [address] [place] [address] , [address], [place] [address], Ph#: [phone]  [facility]: [address], [place] [address], Ph#: [phone]  Office: 1 [facility], [facility] [address], [place] [address] Ph#: [phone]    Patient Name: JAVIER RUEDA  Age and : 48y (74)  MRN #: [id number]  Location: [facility] 14  Referring Physician: Thais Cai    Study Date: 19    _____________________________________________________________  TECHNICAL INFORMATION    Placement and Labeling of Electrodes:  The EEG was performed utilizing 20 channels referential EEG connections (coronal over temporal over parasagittal montage) using all standard 10-20 electrode placements with EKG.  Recording was at a sampling rate of 256 samples per second per channel.  Time synchronized digital video recording was done simultaneously with EEG recording.  A low light infrared camera was used for low light recording.  Greyson and seizure detection algorithms were utilized.    _____________________________________________________________  HISTORY    Patient is a 48y old  Male who presents with a chief complaint of Left basal ganglia hemorrhage (2019 08:17)      PERTINENT MEDICATION:  None  _____________________________________________________________  STUDY INTERPRETATION    Findings: The background was continuous, spontaneously variable and reactive. Background predominantly consisted of theta, delta and faster activities. No posterior dominant rhythm seen.    Background Slowing:  Diffuse theta and polymorphic delta slowing.    Focal Slowing:   None were present.    Sleep Background:  Drowsiness and stage II sleep transients were not recorded.    Other Non-Epileptiform Findings:  -Asymmetry, Decreased Background Amplitude and Vertex wave amplitude, Left hemisphere    Interictal Epileptiform Activity:   None were present.    Events:  Clinical events: None recorded.  Seizures: None recorded.    Activation Procedures:   Hyperventilation was not performed.    Photic stimulation was not performed.     Artifacts:  Intermittent myogenic and movement artifacts were noted.    ECG:  The heart rate on single channel ECG was predominantly between 60-70 BPM.    _____________________________________________________________  EEG SUMMARY/CLASSIFICATION    Abnormal EEG in an unresponsive patient.  - Moderate to severe generalized slowing, continuous  -Asymmetry, Decreased Background Amplitude and Vertex wave amplitude, Left hemisphere    _____________________________________________________________  EEG IMPRESSION/CLINICAL CORRELATE    Abnormal EEG study.  1. Moderate to severe nonspecific diffuse cerebral dysfunction.   2. Cortical dysfunction in the left hemisphere  3. No epileptiform pattern or seizure seen.      Kathleen Ferreira MD  Epilepsy Fellow    Adam Gutierrez MD  Attending Physician, [facility] Epilepsy [place]

## 2019-02-22 DIAGNOSIS — J96.90 RESPIRATORY FAILURE, UNSPECIFIED, UNSPECIFIED WHETHER WITH HYPOXIA OR HYPERCAPNIA: ICD-10-CM

## 2019-02-22 DIAGNOSIS — I61.9 NONTRAUMATIC INTRACEREBRAL HEMORRHAGE, UNSPECIFIED: ICD-10-CM

## 2019-02-22 PROBLEM — Z00.00 ENCOUNTER FOR PREVENTIVE HEALTH EXAMINATION: Status: ACTIVE | Noted: 2019-02-22

## 2019-02-22 LAB
APPEARANCE UR: CLEAR — SIGNIFICANT CHANGE UP
APTT BLD: 28 SEC — SIGNIFICANT CHANGE UP (ref 27.5–36.3)
BASE EXCESS BLDA CALC-SCNC: 2 MMOL/L — SIGNIFICANT CHANGE UP (ref -2–2)
BILIRUB UR-MCNC: NEGATIVE — SIGNIFICANT CHANGE UP
BLD GP AB SCN SERPL QL: NEGATIVE — SIGNIFICANT CHANGE UP
CO2 BLDA-SCNC: 26 MMOL/L — SIGNIFICANT CHANGE UP (ref 22–30)
COLOR SPEC: SIGNIFICANT CHANGE UP
CULTURE RESULTS: SIGNIFICANT CHANGE UP
CULTURE RESULTS: SIGNIFICANT CHANGE UP
DIFF PNL FLD: NEGATIVE — SIGNIFICANT CHANGE UP
GAS PNL BLDA: SIGNIFICANT CHANGE UP
GLUCOSE UR QL: NEGATIVE — SIGNIFICANT CHANGE UP
HCO3 BLDA-SCNC: 25 MMOL/L — SIGNIFICANT CHANGE UP (ref 21–29)
HCT VFR BLD CALC: 33.5 % — LOW (ref 39–50)
HGB BLD-MCNC: 11.8 G/DL — LOW (ref 13–17)
HOROWITZ INDEX BLDA+IHG-RTO: 40 — SIGNIFICANT CHANGE UP
INR BLD: 0.98 RATIO — SIGNIFICANT CHANGE UP (ref 0.88–1.16)
KETONES UR-MCNC: NEGATIVE — SIGNIFICANT CHANGE UP
LEUKOCYTE ESTERASE UR-ACNC: NEGATIVE — SIGNIFICANT CHANGE UP
MCHC RBC-ENTMCNC: 32.8 PG — SIGNIFICANT CHANGE UP (ref 27–34)
MCHC RBC-ENTMCNC: 35.3 GM/DL — SIGNIFICANT CHANGE UP (ref 32–36)
MCV RBC AUTO: 92.9 FL — SIGNIFICANT CHANGE UP (ref 80–100)
NITRITE UR-MCNC: NEGATIVE — SIGNIFICANT CHANGE UP
OSMOLALITY SERPL: 300 MOS/KG — SIGNIFICANT CHANGE UP (ref 275–300)
PCO2 BLDA: 36 MMHG — SIGNIFICANT CHANGE UP (ref 32–46)
PH BLDA: 7.46 — HIGH (ref 7.35–7.45)
PH UR: 6.5 — SIGNIFICANT CHANGE UP (ref 5–8)
PLATELET # BLD AUTO: 249 K/UL — SIGNIFICANT CHANGE UP (ref 150–400)
PO2 BLDA: 159 MMHG — HIGH (ref 74–108)
PROT UR-MCNC: NEGATIVE — SIGNIFICANT CHANGE UP
PROTHROM AB SERPL-ACNC: 11.3 SEC — SIGNIFICANT CHANGE UP (ref 10–12.9)
RBC # BLD: 3.6 M/UL — LOW (ref 4.2–5.8)
RBC # FLD: 11.8 % — SIGNIFICANT CHANGE UP (ref 10.3–14.5)
RH IG SCN BLD-IMP: POSITIVE — SIGNIFICANT CHANGE UP
SAO2 % BLDA: 98 % — HIGH (ref 92–96)
SP GR SPEC: 1.01 — SIGNIFICANT CHANGE UP (ref 1.01–1.02)
SPECIMEN SOURCE: SIGNIFICANT CHANGE UP
SPECIMEN SOURCE: SIGNIFICANT CHANGE UP
UROBILINOGEN FLD QL: NEGATIVE — SIGNIFICANT CHANGE UP
WBC # BLD: 7.7 K/UL — SIGNIFICANT CHANGE UP (ref 3.8–10.5)
WBC # FLD AUTO: 7.7 K/UL — SIGNIFICANT CHANGE UP (ref 3.8–10.5)

## 2019-02-22 PROCEDURE — 99223 1ST HOSP IP/OBS HIGH 75: CPT

## 2019-02-22 PROCEDURE — ZZZZZ: CPT

## 2019-02-22 PROCEDURE — 99253 IP/OBS CNSLTJ NEW/EST LOW 45: CPT

## 2019-02-22 PROCEDURE — 71045 X-RAY EXAM CHEST 1 VIEW: CPT | Mod: 26

## 2019-02-22 PROCEDURE — 93010 ELECTROCARDIOGRAM REPORT: CPT

## 2019-02-22 PROCEDURE — 99291 CRITICAL CARE FIRST HOUR: CPT

## 2019-02-22 PROCEDURE — 95951: CPT | Mod: 26

## 2019-02-22 PROCEDURE — 70450 CT HEAD/BRAIN W/O DYE: CPT | Mod: 26

## 2019-02-22 RX ORDER — AMLODIPINE BESYLATE 2.5 MG/1
10 TABLET ORAL DAILY
Qty: 0 | Refills: 0 | Status: DISCONTINUED | OUTPATIENT
Start: 2019-02-22 | End: 2019-02-25

## 2019-02-22 RX ORDER — DEXAMETHASONE 0.5 MG/5ML
10 ELIXIR ORAL ONCE
Qty: 0 | Refills: 0 | Status: COMPLETED | OUTPATIENT
Start: 2019-02-22 | End: 2019-02-22

## 2019-02-22 RX ORDER — SODIUM CHLORIDE 5 G/100ML
1000 INJECTION, SOLUTION INTRAVENOUS
Qty: 0 | Refills: 0 | Status: DISCONTINUED | OUTPATIENT
Start: 2019-02-22 | End: 2019-02-25

## 2019-02-22 RX ORDER — OXYCODONE HYDROCHLORIDE 5 MG/1
5 TABLET ORAL EVERY 4 HOURS
Qty: 0 | Refills: 0 | Status: DISCONTINUED | OUTPATIENT
Start: 2019-02-22 | End: 2019-02-25

## 2019-02-22 RX ORDER — POTASSIUM PHOSPHATE, MONOBASIC POTASSIUM PHOSPHATE, DIBASIC 236; 224 MG/ML; MG/ML
15 INJECTION, SOLUTION INTRAVENOUS ONCE
Qty: 0 | Refills: 0 | Status: COMPLETED | OUTPATIENT
Start: 2019-02-22 | End: 2019-02-22

## 2019-02-22 RX ORDER — POTASSIUM CHLORIDE 20 MEQ
40 PACKET (EA) ORAL ONCE
Qty: 0 | Refills: 0 | Status: COMPLETED | OUTPATIENT
Start: 2019-02-22 | End: 2019-02-22

## 2019-02-22 RX ORDER — DEXAMETHASONE 0.5 MG/5ML
10 ELIXIR ORAL ONCE
Qty: 0 | Refills: 0 | Status: DISCONTINUED | OUTPATIENT
Start: 2019-02-22 | End: 2019-02-22

## 2019-02-22 RX ORDER — MANNITOL
50 POWDER (GRAM) MISCELLANEOUS ONCE
Qty: 0 | Refills: 0 | Status: COMPLETED | OUTPATIENT
Start: 2019-02-22 | End: 2019-02-22

## 2019-02-22 RX ORDER — OXYCODONE HYDROCHLORIDE 5 MG/1
10 TABLET ORAL EVERY 4 HOURS
Qty: 0 | Refills: 0 | Status: DISCONTINUED | OUTPATIENT
Start: 2019-02-22 | End: 2019-02-25

## 2019-02-22 RX ORDER — LISINOPRIL 2.5 MG/1
20 TABLET ORAL DAILY
Qty: 0 | Refills: 0 | Status: DISCONTINUED | OUTPATIENT
Start: 2019-02-22 | End: 2019-02-22

## 2019-02-22 RX ORDER — LISINOPRIL 2.5 MG/1
40 TABLET ORAL DAILY
Qty: 0 | Refills: 0 | Status: DISCONTINUED | OUTPATIENT
Start: 2019-02-22 | End: 2019-02-25

## 2019-02-22 RX ADMIN — HUMAN INSULIN 18 UNIT(S): 100 INJECTION, SUSPENSION SUBCUTANEOUS at 00:11

## 2019-02-22 RX ADMIN — AMLODIPINE BESYLATE 10 MILLIGRAM(S): 2.5 TABLET ORAL at 17:24

## 2019-02-22 RX ADMIN — LISINOPRIL 20 MILLIGRAM(S): 2.5 TABLET ORAL at 06:20

## 2019-02-22 RX ADMIN — FAMOTIDINE 20 MILLIGRAM(S): 10 INJECTION INTRAVENOUS at 17:24

## 2019-02-22 RX ADMIN — HUMAN INSULIN 18 UNIT(S): 100 INJECTION, SUSPENSION SUBCUTANEOUS at 23:37

## 2019-02-22 RX ADMIN — LISINOPRIL 40 MILLIGRAM(S): 2.5 TABLET ORAL at 17:24

## 2019-02-22 RX ADMIN — Medication 500 GRAM(S): at 16:54

## 2019-02-22 RX ADMIN — ENOXAPARIN SODIUM 40 MILLIGRAM(S): 100 INJECTION SUBCUTANEOUS at 17:43

## 2019-02-22 RX ADMIN — POTASSIUM PHOSPHATE, MONOBASIC POTASSIUM PHOSPHATE, DIBASIC 62.5 MILLIMOLE(S): 236; 224 INJECTION, SOLUTION INTRAVENOUS at 01:03

## 2019-02-22 RX ADMIN — Medication 2: at 11:46

## 2019-02-22 RX ADMIN — HUMAN INSULIN 18 UNIT(S): 100 INJECTION, SUSPENSION SUBCUTANEOUS at 05:17

## 2019-02-22 RX ADMIN — Medication 1 MILLIGRAM(S): at 11:39

## 2019-02-22 RX ADMIN — Medication 2: at 17:29

## 2019-02-22 RX ADMIN — Medication 62.5 MILLIMOLE(S): at 11:58

## 2019-02-22 RX ADMIN — SODIUM CHLORIDE 50 MILLILITER(S): 5 INJECTION, SOLUTION INTRAVENOUS at 19:00

## 2019-02-22 RX ADMIN — CHLORHEXIDINE GLUCONATE 1 APPLICATION(S): 213 SOLUTION TOPICAL at 00:11

## 2019-02-22 RX ADMIN — SODIUM CHLORIDE 2 GRAM(S): 9 INJECTION INTRAMUSCULAR; INTRAVENOUS; SUBCUTANEOUS at 21:11

## 2019-02-22 RX ADMIN — Medication 40 MILLIEQUIVALENT(S): at 01:03

## 2019-02-22 RX ADMIN — HUMAN INSULIN 18 UNIT(S): 100 INJECTION, SUSPENSION SUBCUTANEOUS at 11:47

## 2019-02-22 RX ADMIN — Medication 100 MILLIGRAM(S): at 05:17

## 2019-02-22 RX ADMIN — Medication 650 MILLIGRAM(S): at 18:39

## 2019-02-22 RX ADMIN — Medication 100 MILLIGRAM(S): at 21:11

## 2019-02-22 RX ADMIN — FAMOTIDINE 20 MILLIGRAM(S): 10 INJECTION INTRAVENOUS at 05:17

## 2019-02-22 RX ADMIN — Medication 100 MILLIGRAM(S): at 11:39

## 2019-02-22 RX ADMIN — CHLORHEXIDINE GLUCONATE 1 APPLICATION(S): 213 SOLUTION TOPICAL at 22:09

## 2019-02-22 RX ADMIN — HUMAN INSULIN 18 UNIT(S): 100 INJECTION, SUSPENSION SUBCUTANEOUS at 17:30

## 2019-02-22 RX ADMIN — SODIUM CHLORIDE 2 GRAM(S): 9 INJECTION INTRAMUSCULAR; INTRAVENOUS; SUBCUTANEOUS at 13:28

## 2019-02-22 RX ADMIN — SODIUM CHLORIDE 2 GRAM(S): 9 INJECTION INTRAMUSCULAR; INTRAVENOUS; SUBCUTANEOUS at 05:17

## 2019-02-22 RX ADMIN — Medication 102 MILLIGRAM(S): at 17:39

## 2019-02-22 RX ADMIN — Medication 100 MILLIGRAM(S): at 13:28

## 2019-02-22 NOTE — PROGRESS NOTE ADULT - SUBJECTIVE AND OBJECTIVE BOX
47 yo M with h/o EtOH, presented with Lt BG hemorrhage, went into wdrl - intubated.    Bradycardic o/n, labetalol held  no improvement in exam despite all benzo/sedating meds d/c'ed    Vitals/labs/meds/imaging reviewed    Exam:    General: intubated.  Neurological:   not EO, no blink to threat, not FC, LC L>R (trace), BLE trace WD  Pulmonary: Clear to Auscultation, No Rales, No Rhonchi, No Wheezes   Cardiovascular: S1, S2, Regular Rate and Rhythm   Gastrointestinal: Soft, Nontender, Nondistended   Extremities: No calf tenderness

## 2019-02-22 NOTE — EEG REPORT - NS EEG TEXT BOX
[facility]   COMPREHENSIVE EPILEPSY CENTER   REPORT OF CONTINUOUS VIDEO EEG     [facility]: [address] [place] [address] , [address], [place] [address], Ph#: [phone]  [facility]: [address], [place] [address], Ph#: [phone]  Office: [address] [place] [address], [facility] [address], [place] [address] Ph#: [phone]    Patient Name: JAVIER RUEDA  Age and : 48y (74)  MRN #: [id number]  Location: [facility] 14  Referring Physician: Thais Cai    Study Date: 19 - 19    _____________________________________________________________  TECHNICAL INFORMATION    Placement and Labeling of Electrodes:  The EEG was performed utilizing 20 channels referential EEG connections (coronal over temporal over parasagittal montage) using all standard 10-20 electrode placements with EKG.  Recording was at a sampling rate of 256 samples per second per channel.  Time synchronized digital video recording was done simultaneously with EEG recording.  A low light infrared camera was used for low light recording.  Greyson and seizure detection algorithms were utilized.    _____________________________________________________________  HISTORY    Patient is a 48y old  Male who presents with a chief complaint of Left basal ganglia hemorrhage (2019 08:17)      PERTINENT MEDICATION:  None  _____________________________________________________________  STUDY INTERPRETATION    Findings: The background was continuous, spontaneously variable and reactive. Background predominantly consisted of theta, delta and faster activities. No posterior dominant rhythm seen.    Background Slowing:  Diffuse theta and polymorphic delta slowing.    Focal Slowing:   None were present.    Sleep Background:  Drowsiness and stage II sleep transients were not recorded.    Other Non-Epileptiform Findings:  -Asymmetry, Decreased Background Amplitude and Vertex wave amplitude, Left hemisphere    Interictal Epileptiform Activity:   None were present.    Events:  Clinical events: None recorded.  Seizures: None recorded.    Activation Procedures:   Hyperventilation was not performed.    Photic stimulation was not performed.     Artifacts:  Intermittent myogenic and movement artifacts were noted.    ECG:  The heart rate on single channel ECG was predominantly between 60-70 BPM.    _____________________________________________________________  EEG SUMMARY/CLASSIFICATION    Abnormal EEG in an unresponsive patient.  - Moderate to severe generalized slowing, continuous  - Asymmetry, Decreased Background Amplitude and Vertex wave amplitude, Left hemisphere    _____________________________________________________________  EEG IMPRESSION/CLINICAL CORRELATE    Abnormal EEG study.  1. Moderate to severe nonspecific diffuse cerebral dysfunction.   2. Cortical dysfunction in the left hemisphere  3. No epileptiform pattern or seizure seen.    Adam Gutierrez MD  Attending Physician, [facility] Epilepsy Center

## 2019-02-22 NOTE — CHART NOTE - NSCHARTNOTEFT_GEN_A_CORE
Pt continues to have loose BM since formula change to Vital 2 days ago. Recommend Banatrol Plus 1 packet/day. Provide half of a packet mixed with 120ml water, 1 hour later provide remainder of packet mixed with 120ml water. Discussed with TIM Hawthorne, RD pager #[phone]

## 2019-02-22 NOTE — PROGRESS NOTE ADULT - SUBJECTIVE AND OBJECTIVE BOX
This afternoon patient had anisocoric pupils, 4 mm on the left and 2 mm on the right, sluggishly reactive. Weakly localizing on the LUE, withdrawing on RUE. CT demonstrated increased edema and mass effect associated with the basal ganglia hemorrhage. Mannitol administered. The case was reviewed with Dr. Rizzo and Dr. Winters, and an extensive discussion was undertaken with the healthcare proxy and family. Given the poor neurological prognosis--likelihood the patient will remain in dependent state with no meaningful neurological improvement, the family was informed that surgery is contraindicated due to the risks associated with surgery with negligible benefit. The family was in agreement and did not wish to pursue aggressive surgical measures, but agreed to continue medical and supportive care.  The family also signed a DNR order.

## 2019-02-22 NOTE — PROVIDER CONTACT NOTE (OTHER) - SITUATION
Patients right pupil more sluggish than previous exam.  Slightly larger than Left pupil. No other neuro exam changes.

## 2019-02-22 NOTE — PROGRESS NOTE ADULT - ASSESSMENT
A/P:  Left BG ICH, CTA no vascular malformation, most likley HTN etiology.  ICH score 1  EtOH withdrawal.  Acute hypoxic resp distress.    Neuro:   off sedation  alcohol withdrawal: d/c librium  EEG--negative d/c  get ammonia, LFT WNL  cont thiamine + supplements  MRI done showing stable heme, no other findings on 2/17  Respiratory: likely aspiration pneumonia; PRVC - CPAP, not waking up, will monitor  pressure support as tolerated, poor mental status, would hold extubation now, reassess  CV: -160 mmhg,on hydralazine and labetalol, d/c clonidine  Endocrine: maintain euglycemia, NPH+ROZ  Renal: IVL, salt tabs 2 g q 8 hrs , Na goal 145-150, BMP now   ID: cont zosyn for asp pna x7d completed  GI: on TF, diarrhea, protonix, abdominal xray , will send cx for C.diff --negative  Heme/Onc: DVT ppx:  SCD, heparin 5000 units q 12 hrs; RUE Doppler negative  Social/Family: ICU  Discharge planning: ICU    D/W family regarding ENT/GI consult, will continue to monitor and attempt to wean to extubate over the weekend     Code Status: [x] Full Code [] DNR [] DNI [] Goals of Care:   Disposition: [x] ICU [] Stroke Unit [] RCU []PCU []Floor [] Discharge Home   family updated at bedside    Patient at high risk for neurologic deterioration, ICH expanding, seizures    critical care time, excluding procedures: 45 minutes

## 2019-02-22 NOTE — PROGRESS NOTE ADULT - SUBJECTIVE AND OBJECTIVE BOX
[facility], would like to rescind the DNR at this time.  I discussed and reeducated them that DNR does not mean stopping treatment, they understand.  They would like to discuss with the family before reinstating.   Patient is Full code at this time.

## 2019-02-23 LAB
ANION GAP SERPL CALC-SCNC: 11 MMOL/L — SIGNIFICANT CHANGE UP (ref 5–17)
ANION GAP SERPL CALC-SCNC: 11 MMOL/L — SIGNIFICANT CHANGE UP (ref 5–17)
ANION GAP SERPL CALC-SCNC: 12 MMOL/L — SIGNIFICANT CHANGE UP (ref 5–17)
ANION GAP SERPL CALC-SCNC: 9 MMOL/L — SIGNIFICANT CHANGE UP (ref 5–17)
BASE EXCESS BLDA CALC-SCNC: 2.6 MMOL/L — HIGH (ref -2–2)
BUN SERPL-MCNC: 14 MG/DL — SIGNIFICANT CHANGE UP (ref 7–23)
BUN SERPL-MCNC: 15 MG/DL — SIGNIFICANT CHANGE UP (ref 7–23)
BUN SERPL-MCNC: 15 MG/DL — SIGNIFICANT CHANGE UP (ref 7–23)
BUN SERPL-MCNC: 17 MG/DL — SIGNIFICANT CHANGE UP (ref 7–23)
CALCIUM SERPL-MCNC: 8.5 MG/DL — SIGNIFICANT CHANGE UP (ref 8.4–10.5)
CALCIUM SERPL-MCNC: 8.5 MG/DL — SIGNIFICANT CHANGE UP (ref 8.4–10.5)
CALCIUM SERPL-MCNC: 8.6 MG/DL — SIGNIFICANT CHANGE UP (ref 8.4–10.5)
CALCIUM SERPL-MCNC: 8.7 MG/DL — SIGNIFICANT CHANGE UP (ref 8.4–10.5)
CHLORIDE SERPL-SCNC: 108 MMOL/L — SIGNIFICANT CHANGE UP (ref 96–108)
CHLORIDE SERPL-SCNC: 111 MMOL/L — HIGH (ref 96–108)
CHLORIDE SERPL-SCNC: 111 MMOL/L — HIGH (ref 96–108)
CHLORIDE SERPL-SCNC: 113 MMOL/L — HIGH (ref 96–108)
CO2 BLDA-SCNC: 27 MMOL/L — SIGNIFICANT CHANGE UP (ref 22–30)
CO2 SERPL-SCNC: 21 MMOL/L — LOW (ref 22–31)
CO2 SERPL-SCNC: 22 MMOL/L — SIGNIFICANT CHANGE UP (ref 22–31)
CO2 SERPL-SCNC: 23 MMOL/L — SIGNIFICANT CHANGE UP (ref 22–31)
CO2 SERPL-SCNC: 24 MMOL/L — SIGNIFICANT CHANGE UP (ref 22–31)
CREAT SERPL-MCNC: 0.66 MG/DL — SIGNIFICANT CHANGE UP (ref 0.5–1.3)
CREAT SERPL-MCNC: 0.67 MG/DL — SIGNIFICANT CHANGE UP (ref 0.5–1.3)
CREAT SERPL-MCNC: 0.67 MG/DL — SIGNIFICANT CHANGE UP (ref 0.5–1.3)
CREAT SERPL-MCNC: 0.68 MG/DL — SIGNIFICANT CHANGE UP (ref 0.5–1.3)
CULTURE RESULTS: SIGNIFICANT CHANGE UP
GAS PNL BLDA: SIGNIFICANT CHANGE UP
GLUCOSE SERPL-MCNC: 133 MG/DL — HIGH (ref 70–99)
GLUCOSE SERPL-MCNC: 149 MG/DL — HIGH (ref 70–99)
GLUCOSE SERPL-MCNC: 163 MG/DL — HIGH (ref 70–99)
GLUCOSE SERPL-MCNC: 189 MG/DL — HIGH (ref 70–99)
GRAM STN FLD: SIGNIFICANT CHANGE UP
HCO3 BLDA-SCNC: 26 MMOL/L — SIGNIFICANT CHANGE UP (ref 21–29)
HCT VFR BLD CALC: 34.4 % — LOW (ref 39–50)
HGB BLD-MCNC: 11.7 G/DL — LOW (ref 13–17)
HOROWITZ INDEX BLDA+IHG-RTO: 40 — SIGNIFICANT CHANGE UP
MAGNESIUM SERPL-MCNC: 2.2 MG/DL — SIGNIFICANT CHANGE UP (ref 1.6–2.6)
MAGNESIUM SERPL-MCNC: 2.3 MG/DL — SIGNIFICANT CHANGE UP (ref 1.6–2.6)
MAGNESIUM SERPL-MCNC: 2.4 MG/DL — SIGNIFICANT CHANGE UP (ref 1.6–2.6)
MCHC RBC-ENTMCNC: 32.5 PG — SIGNIFICANT CHANGE UP (ref 27–34)
MCHC RBC-ENTMCNC: 34.1 GM/DL — SIGNIFICANT CHANGE UP (ref 32–36)
MCV RBC AUTO: 95.2 FL — SIGNIFICANT CHANGE UP (ref 80–100)
ORGANISM # SPEC MICROSCOPIC CNT: SIGNIFICANT CHANGE UP
ORGANISM # SPEC MICROSCOPIC CNT: SIGNIFICANT CHANGE UP
OSMOLALITY SERPL: 300 MOS/KG — SIGNIFICANT CHANGE UP (ref 275–300)
OSMOLALITY SERPL: 304 MOS/KG — HIGH (ref 275–300)
OSMOLALITY SERPL: 304 MOS/KG — HIGH (ref 275–300)
OSMOLALITY SERPL: 308 MOS/KG — HIGH (ref 275–300)
PCO2 BLDA: 37 MMHG — SIGNIFICANT CHANGE UP (ref 32–46)
PH BLDA: 7.46 — HIGH (ref 7.35–7.45)
PHOSPHATE SERPL-MCNC: 1.9 MG/DL — LOW (ref 2.5–4.5)
PHOSPHATE SERPL-MCNC: 2.3 MG/DL — LOW (ref 2.5–4.5)
PHOSPHATE SERPL-MCNC: 3.8 MG/DL — SIGNIFICANT CHANGE UP (ref 2.5–4.5)
PLATELET # BLD AUTO: 262 K/UL — SIGNIFICANT CHANGE UP (ref 150–400)
PO2 BLDA: 165 MMHG — HIGH (ref 74–108)
POTASSIUM SERPL-MCNC: 3.4 MMOL/L — LOW (ref 3.5–5.3)
POTASSIUM SERPL-MCNC: 3.8 MMOL/L — SIGNIFICANT CHANGE UP (ref 3.5–5.3)
POTASSIUM SERPL-MCNC: 4 MMOL/L — SIGNIFICANT CHANGE UP (ref 3.5–5.3)
POTASSIUM SERPL-MCNC: 4 MMOL/L — SIGNIFICANT CHANGE UP (ref 3.5–5.3)
POTASSIUM SERPL-SCNC: 3.4 MMOL/L — LOW (ref 3.5–5.3)
POTASSIUM SERPL-SCNC: 3.8 MMOL/L — SIGNIFICANT CHANGE UP (ref 3.5–5.3)
POTASSIUM SERPL-SCNC: 4 MMOL/L — SIGNIFICANT CHANGE UP (ref 3.5–5.3)
POTASSIUM SERPL-SCNC: 4 MMOL/L — SIGNIFICANT CHANGE UP (ref 3.5–5.3)
RBC # BLD: 3.61 M/UL — LOW (ref 4.2–5.8)
RBC # FLD: 12.2 % — SIGNIFICANT CHANGE UP (ref 10.3–14.5)
SAO2 % BLDA: 99 % — HIGH (ref 92–96)
SODIUM SERPL-SCNC: 141 MMOL/L — SIGNIFICANT CHANGE UP (ref 135–145)
SODIUM SERPL-SCNC: 144 MMOL/L — SIGNIFICANT CHANGE UP (ref 135–145)
SODIUM SERPL-SCNC: 144 MMOL/L — SIGNIFICANT CHANGE UP (ref 135–145)
SODIUM SERPL-SCNC: 147 MMOL/L — HIGH (ref 135–145)
SPECIMEN SOURCE: SIGNIFICANT CHANGE UP
SPECIMEN SOURCE: SIGNIFICANT CHANGE UP
WBC # BLD: 8.5 K/UL — SIGNIFICANT CHANGE UP (ref 3.8–10.5)
WBC # FLD AUTO: 8.5 K/UL — SIGNIFICANT CHANGE UP (ref 3.8–10.5)

## 2019-02-23 PROCEDURE — 99292 CRITICAL CARE ADDL 30 MIN: CPT

## 2019-02-23 PROCEDURE — 71045 X-RAY EXAM CHEST 1 VIEW: CPT | Mod: 26

## 2019-02-23 PROCEDURE — 70450 CT HEAD/BRAIN W/O DYE: CPT | Mod: 26

## 2019-02-23 PROCEDURE — 99291 CRITICAL CARE FIRST HOUR: CPT

## 2019-02-23 RX ORDER — CHLORHEXIDINE GLUCONATE 213 G/1000ML
15 SOLUTION TOPICAL
Qty: 0 | Refills: 0 | Status: DISCONTINUED | OUTPATIENT
Start: 2019-02-23 | End: 2019-02-25

## 2019-02-23 RX ORDER — POTASSIUM CHLORIDE 20 MEQ
20 PACKET (EA) ORAL
Qty: 0 | Refills: 0 | Status: DISCONTINUED | OUTPATIENT
Start: 2019-02-23 | End: 2019-02-23

## 2019-02-23 RX ORDER — POTASSIUM PHOSPHATE, MONOBASIC POTASSIUM PHOSPHATE, DIBASIC 236; 224 MG/ML; MG/ML
15 INJECTION, SOLUTION INTRAVENOUS ONCE
Qty: 0 | Refills: 0 | Status: COMPLETED | OUTPATIENT
Start: 2019-02-23 | End: 2019-02-23

## 2019-02-23 RX ADMIN — SODIUM CHLORIDE 2 GRAM(S): 9 INJECTION INTRAMUSCULAR; INTRAVENOUS; SUBCUTANEOUS at 21:42

## 2019-02-23 RX ADMIN — HUMAN INSULIN 18 UNIT(S): 100 INJECTION, SUSPENSION SUBCUTANEOUS at 17:12

## 2019-02-23 RX ADMIN — Medication 20 MILLIEQUIVALENT(S): at 17:05

## 2019-02-23 RX ADMIN — CHLORHEXIDINE GLUCONATE 15 MILLILITER(S): 213 SOLUTION TOPICAL at 17:06

## 2019-02-23 RX ADMIN — SODIUM CHLORIDE 2 GRAM(S): 9 INJECTION INTRAMUSCULAR; INTRAVENOUS; SUBCUTANEOUS at 05:42

## 2019-02-23 RX ADMIN — FENTANYL CITRATE 25 MICROGRAM(S): 50 INJECTION INTRAVENOUS at 22:15

## 2019-02-23 RX ADMIN — CHLORHEXIDINE GLUCONATE 1 APPLICATION(S): 213 SOLUTION TOPICAL at 21:37

## 2019-02-23 RX ADMIN — Medication 100 MILLIGRAM(S): at 14:25

## 2019-02-23 RX ADMIN — CHLORHEXIDINE GLUCONATE 15 MILLILITER(S): 213 SOLUTION TOPICAL at 05:42

## 2019-02-23 RX ADMIN — Medication 100 MILLIGRAM(S): at 05:42

## 2019-02-23 RX ADMIN — AMLODIPINE BESYLATE 10 MILLIGRAM(S): 2.5 TABLET ORAL at 05:42

## 2019-02-23 RX ADMIN — Medication 100 MILLIGRAM(S): at 21:42

## 2019-02-23 RX ADMIN — ENOXAPARIN SODIUM 40 MILLIGRAM(S): 100 INJECTION SUBCUTANEOUS at 17:06

## 2019-02-23 RX ADMIN — FAMOTIDINE 20 MILLIGRAM(S): 10 INJECTION INTRAVENOUS at 05:41

## 2019-02-23 RX ADMIN — HUMAN INSULIN 18 UNIT(S): 100 INJECTION, SUSPENSION SUBCUTANEOUS at 05:59

## 2019-02-23 RX ADMIN — LISINOPRIL 40 MILLIGRAM(S): 2.5 TABLET ORAL at 05:42

## 2019-02-23 RX ADMIN — Medication 1 MILLIGRAM(S): at 11:09

## 2019-02-23 RX ADMIN — HUMAN INSULIN 18 UNIT(S): 100 INJECTION, SUSPENSION SUBCUTANEOUS at 11:20

## 2019-02-23 RX ADMIN — POTASSIUM PHOSPHATE, MONOBASIC POTASSIUM PHOSPHATE, DIBASIC 62.5 MILLIMOLE(S): 236; 224 INJECTION, SOLUTION INTRAVENOUS at 16:57

## 2019-02-23 RX ADMIN — Medication 100 MILLIGRAM(S): at 11:27

## 2019-02-23 RX ADMIN — Medication 2: at 05:58

## 2019-02-23 RX ADMIN — FENTANYL CITRATE 25 MICROGRAM(S): 50 INJECTION INTRAVENOUS at 22:00

## 2019-02-23 RX ADMIN — FAMOTIDINE 20 MILLIGRAM(S): 10 INJECTION INTRAVENOUS at 17:06

## 2019-02-23 RX ADMIN — FENTANYL CITRATE 25 MICROGRAM(S): 50 INJECTION INTRAVENOUS at 08:20

## 2019-02-23 RX ADMIN — SODIUM CHLORIDE 2 GRAM(S): 9 INJECTION INTRAMUSCULAR; INTRAVENOUS; SUBCUTANEOUS at 14:26

## 2019-02-23 NOTE — PROGRESS NOTE ADULT - SUBJECTIVE AND OBJECTIVE BOX
large L thalamic ICH c/b EtOH WD    persistent swelling, MLS    vitals/labs/meds/imaging reviewed  intubated  no EO noxious, R pupil 3mm reactive, L pupil 2mm sluggish, no blinking to threat, no FC  LUE attempts to LC, RUE WD  BLE TF     -monitor exam for signs of intracranial hypertension  -hypertonics for Na 145-155  -mannitol prn  -monitor serum osm  -tentatively pre op for trach/peg next tue  -palliative consult per family, would like to continue GOC conversation  -euglycemia  -DVT ppx    additional critical care time 35min

## 2019-02-23 NOTE — PROGRESS NOTE ADULT - ASSESSMENT
A/P:  Left BG ICH, CTA no vascular malformation, most likley HTN etiology.  ICH score 1  EtOH withdrawal.  Acute hypoxic resp distress.    Neuro:   off sedation  EEG--negative  cont thiamine + supplements  MRI done showing stable heme, no other findings on 2/17    Respiratory: likely aspiration pneumonia; PRVC - CPAP, not waking up, will monitor  pressure support as tolerated, poor mental status, would hold extubation now, reassess  CV: -160 mmhg,on hydralazine and labetalol, d/c clonidine  Endocrine: maintain euglycemia, NPH+ROZ  Renal: IVL, salt tabs 2 g q 8 hrs , Na goal 145-150, BMP now   ID: cont zosyn for asp pna x7d completed  GI: on TF, diarrhea, protonix, abdominal xray , will send cx for C.diff --negative  Heme/Onc: DVT ppx:  SCD, heparin 5000 units q 12 hrs; RUE Doppler negative  Social/Family: patient is DNR as per the discussion with the family  Discharge planning: ICU    D/W family regarding ENT/GI consult, will continue to monitor and attempt to wean to extubate over the weekend     Code Status: [] Full Code [x] DNR [] DNI [] Goals of Care:   Disposition: [x] ICU [] Stroke Unit [] RCU []PCU []Floor [] Discharge Home   family updated at bedside    Patient at high risk for neurologic deterioration, ICH expanding, seizures    critical care time, excluding procedures: 45 minutes

## 2019-02-23 NOTE — EEG REPORT - NS EEG TEXT BOX
[facility]   COMPREHENSIVE EPILEPSY CENTER   REPORT OF CONTINUOUS VIDEO EEG     [facility]: [address] [place] [address] , [address], [place] [address], Ph#: [phone]  [facility]: [address], [place] [address], Ph#: [phone]  Office: 1 [facility], [facility] [address], [place] [address] Ph#: [phone]    Patient Name: JAVIER RUEDA  Age and : 48y (74)  MRN #: [id number]  Location: [facility] 14  Referring Physician: Thais Cai    Study Date: 19   _____________________________________________________________  TECHNICAL INFORMATION    Placement and Labeling of Electrodes:  The EEG was performed utilizing 20 channels referential EEG connections (coronal over temporal over parasagittal montage) using all standard 10-20 electrode placements with EKG.  Recording was at a sampling rate of 256 samples per second per channel.  Time synchronized digital video recording was done simultaneously with EEG recording.  A low light infrared camera was used for low light recording.  Greyson and seizure detection algorithms were utilized.    _____________________________________________________________  HISTORY    Patient is a 48y old  Male who presents with a chief complaint of Left basal ganglia hemorrhage (2019 08:17)      PERTINENT MEDICATION:  None  _____________________________________________________________  STUDY INTERPRETATION    Findings: The background was continuous, spontaneously variable and reactive. Background predominantly consisted of theta, delta and faster activities. No posterior dominant rhythm seen.    Background Slowing:  Diffuse theta and polymorphic delta slowing.    Focal Slowing:   None were present.    Sleep Background:  Drowsiness and stage II sleep transients were not recorded.    Other Non-Epileptiform Findings:  -Asymmetry, Decreased Background Amplitude and Vertex wave amplitude, Left hemisphere    Interictal Epileptiform Activity:   None were present.    Events:  Clinical events: None recorded.  Seizures: None recorded.    Activation Procedures:   Hyperventilation was not performed.    Photic stimulation was not performed.     Artifacts:  Intermittent myogenic and movement artifacts were noted.    ECG:  The heart rate on single channel ECG was predominantly between 60-70 BPM.    _____________________________________________________________  EEG SUMMARY/CLASSIFICATION    Abnormal EEG in an unresponsive patient.  - Moderate to severe generalized slowing, continuous  - Asymmetry, Decreased Background Amplitude and Vertex wave amplitude, Left hemisphere    _____________________________________________________________  EEG IMPRESSION/CLINICAL CORRELATE    Abnormal EEG study.  1. Moderate to severe nonspecific diffuse cerebral dysfunction.   2. Cortical dysfunction in the left hemisphere  3. No epileptiform pattern or seizure seen.    Bryce Faria MD  Attending Physician, [facility] Epilepsy Center

## 2019-02-23 NOTE — PROGRESS NOTE ADULT - SUBJECTIVE AND OBJECTIVE BOX
49 yo M with h/o EtOH, presented with Lt BG hemorrhage, went into wdrl - intubated.    Bradycardic o/n, labetalol held  no improvement in exam despite all benzo/sedating meds d/c'ed    Vitals/labs/meds/imaging reviewed    Exam:    General: intubated.  Neurological:   not EO, no blink to threat, anisociria - R5 mm and L2 mm, both sluggish, not FC, LC L>R (trace), BLE trace WD  Pulmonary: Clear to Auscultation, No Rales, No Rhonchi, No Wheezes   Cardiovascular: S1, S2, Regular Rate and Rhythm   Gastrointestinal: Soft, Nontender, Nondistended   Extremities: No calf tenderness

## 2019-02-24 LAB
ANION GAP SERPL CALC-SCNC: 11 MMOL/L — SIGNIFICANT CHANGE UP (ref 5–17)
ANION GAP SERPL CALC-SCNC: 11 MMOL/L — SIGNIFICANT CHANGE UP (ref 5–17)
ANION GAP SERPL CALC-SCNC: 12 MMOL/L — SIGNIFICANT CHANGE UP (ref 5–17)
ANION GAP SERPL CALC-SCNC: 12 MMOL/L — SIGNIFICANT CHANGE UP (ref 5–17)
BUN SERPL-MCNC: 13 MG/DL — SIGNIFICANT CHANGE UP (ref 7–23)
BUN SERPL-MCNC: 14 MG/DL — SIGNIFICANT CHANGE UP (ref 7–23)
BUN SERPL-MCNC: 14 MG/DL — SIGNIFICANT CHANGE UP (ref 7–23)
BUN SERPL-MCNC: 15 MG/DL — SIGNIFICANT CHANGE UP (ref 7–23)
CALCIUM SERPL-MCNC: 8.5 MG/DL — SIGNIFICANT CHANGE UP (ref 8.4–10.5)
CALCIUM SERPL-MCNC: 8.5 MG/DL — SIGNIFICANT CHANGE UP (ref 8.4–10.5)
CALCIUM SERPL-MCNC: 8.8 MG/DL — SIGNIFICANT CHANGE UP (ref 8.4–10.5)
CALCIUM SERPL-MCNC: 9 MG/DL — SIGNIFICANT CHANGE UP (ref 8.4–10.5)
CHLORIDE SERPL-SCNC: 109 MMOL/L — HIGH (ref 96–108)
CHLORIDE SERPL-SCNC: 111 MMOL/L — HIGH (ref 96–108)
CHLORIDE SERPL-SCNC: 112 MMOL/L — HIGH (ref 96–108)
CHLORIDE SERPL-SCNC: 114 MMOL/L — HIGH (ref 96–108)
CO2 SERPL-SCNC: 21 MMOL/L — LOW (ref 22–31)
CO2 SERPL-SCNC: 21 MMOL/L — LOW (ref 22–31)
CO2 SERPL-SCNC: 23 MMOL/L — SIGNIFICANT CHANGE UP (ref 22–31)
CO2 SERPL-SCNC: 23 MMOL/L — SIGNIFICANT CHANGE UP (ref 22–31)
CREAT SERPL-MCNC: 0.6 MG/DL — SIGNIFICANT CHANGE UP (ref 0.5–1.3)
CREAT SERPL-MCNC: 0.61 MG/DL — SIGNIFICANT CHANGE UP (ref 0.5–1.3)
CREAT SERPL-MCNC: 0.63 MG/DL — SIGNIFICANT CHANGE UP (ref 0.5–1.3)
CREAT SERPL-MCNC: 0.65 MG/DL — SIGNIFICANT CHANGE UP (ref 0.5–1.3)
CULTURE RESULTS: SIGNIFICANT CHANGE UP
CULTURE RESULTS: SIGNIFICANT CHANGE UP
GLUCOSE SERPL-MCNC: 134 MG/DL — HIGH (ref 70–99)
GLUCOSE SERPL-MCNC: 158 MG/DL — HIGH (ref 70–99)
GLUCOSE SERPL-MCNC: 174 MG/DL — HIGH (ref 70–99)
GLUCOSE SERPL-MCNC: 184 MG/DL — HIGH (ref 70–99)
MAGNESIUM SERPL-MCNC: 2.2 MG/DL — SIGNIFICANT CHANGE UP (ref 1.6–2.6)
MAGNESIUM SERPL-MCNC: 2.3 MG/DL — SIGNIFICANT CHANGE UP (ref 1.6–2.6)
MAGNESIUM SERPL-MCNC: 2.4 MG/DL — SIGNIFICANT CHANGE UP (ref 1.6–2.6)
OSMOLALITY SERPL: 307 MOS/KG — HIGH (ref 275–300)
OSMOLALITY SERPL: 311 MOS/KG — HIGH (ref 275–300)
PHOSPHATE SERPL-MCNC: 2.7 MG/DL — SIGNIFICANT CHANGE UP (ref 2.5–4.5)
PHOSPHATE SERPL-MCNC: 3 MG/DL — SIGNIFICANT CHANGE UP (ref 2.5–4.5)
PHOSPHATE SERPL-MCNC: 3.1 MG/DL — SIGNIFICANT CHANGE UP (ref 2.5–4.5)
POTASSIUM SERPL-MCNC: 3.4 MMOL/L — LOW (ref 3.5–5.3)
POTASSIUM SERPL-MCNC: 3.8 MMOL/L — SIGNIFICANT CHANGE UP (ref 3.5–5.3)
POTASSIUM SERPL-MCNC: 4.1 MMOL/L — SIGNIFICANT CHANGE UP (ref 3.5–5.3)
POTASSIUM SERPL-MCNC: 4.2 MMOL/L — SIGNIFICANT CHANGE UP (ref 3.5–5.3)
POTASSIUM SERPL-SCNC: 3.4 MMOL/L — LOW (ref 3.5–5.3)
POTASSIUM SERPL-SCNC: 3.8 MMOL/L — SIGNIFICANT CHANGE UP (ref 3.5–5.3)
POTASSIUM SERPL-SCNC: 4.1 MMOL/L — SIGNIFICANT CHANGE UP (ref 3.5–5.3)
POTASSIUM SERPL-SCNC: 4.2 MMOL/L — SIGNIFICANT CHANGE UP (ref 3.5–5.3)
SODIUM SERPL-SCNC: 143 MMOL/L — SIGNIFICANT CHANGE UP (ref 135–145)
SODIUM SERPL-SCNC: 145 MMOL/L — SIGNIFICANT CHANGE UP (ref 135–145)
SODIUM SERPL-SCNC: 146 MMOL/L — HIGH (ref 135–145)
SODIUM SERPL-SCNC: 146 MMOL/L — HIGH (ref 135–145)
SPECIMEN SOURCE: SIGNIFICANT CHANGE UP
SPECIMEN SOURCE: SIGNIFICANT CHANGE UP

## 2019-02-24 PROCEDURE — 99292 CRITICAL CARE ADDL 30 MIN: CPT

## 2019-02-24 PROCEDURE — 71045 X-RAY EXAM CHEST 1 VIEW: CPT | Mod: 26

## 2019-02-24 PROCEDURE — 99291 CRITICAL CARE FIRST HOUR: CPT

## 2019-02-24 RX ORDER — HUMAN INSULIN 100 [IU]/ML
9 INJECTION, SUSPENSION SUBCUTANEOUS ONCE
Qty: 0 | Refills: 0 | Status: COMPLETED | OUTPATIENT
Start: 2019-02-24 | End: 2019-02-24

## 2019-02-24 RX ORDER — POTASSIUM CHLORIDE 20 MEQ
40 PACKET (EA) ORAL
Qty: 0 | Refills: 0 | Status: COMPLETED | OUTPATIENT
Start: 2019-02-24 | End: 2019-02-24

## 2019-02-24 RX ORDER — MANNITOL
50 POWDER (GRAM) MISCELLANEOUS ONCE
Qty: 0 | Refills: 0 | Status: COMPLETED | OUTPATIENT
Start: 2019-02-24 | End: 2019-02-24

## 2019-02-24 RX ADMIN — ENOXAPARIN SODIUM 40 MILLIGRAM(S): 100 INJECTION SUBCUTANEOUS at 17:53

## 2019-02-24 RX ADMIN — FENTANYL CITRATE 25 MICROGRAM(S): 50 INJECTION INTRAVENOUS at 22:23

## 2019-02-24 RX ADMIN — HUMAN INSULIN 18 UNIT(S): 100 INJECTION, SUSPENSION SUBCUTANEOUS at 00:50

## 2019-02-24 RX ADMIN — HUMAN INSULIN 9 UNIT(S): 100 INJECTION, SUSPENSION SUBCUTANEOUS at 23:54

## 2019-02-24 RX ADMIN — SODIUM CHLORIDE 2 GRAM(S): 9 INJECTION INTRAMUSCULAR; INTRAVENOUS; SUBCUTANEOUS at 21:36

## 2019-02-24 RX ADMIN — CHLORHEXIDINE GLUCONATE 15 MILLILITER(S): 213 SOLUTION TOPICAL at 05:33

## 2019-02-24 RX ADMIN — SODIUM CHLORIDE 75 MILLILITER(S): 5 INJECTION, SOLUTION INTRAVENOUS at 07:00

## 2019-02-24 RX ADMIN — SODIUM CHLORIDE 75 MILLILITER(S): 5 INJECTION, SOLUTION INTRAVENOUS at 19:00

## 2019-02-24 RX ADMIN — FENTANYL CITRATE 25 MICROGRAM(S): 50 INJECTION INTRAVENOUS at 22:09

## 2019-02-24 RX ADMIN — HUMAN INSULIN 18 UNIT(S): 100 INJECTION, SUSPENSION SUBCUTANEOUS at 11:27

## 2019-02-24 RX ADMIN — Medication 500 GRAM(S): at 20:58

## 2019-02-24 RX ADMIN — Medication 40 MILLIEQUIVALENT(S): at 03:18

## 2019-02-24 RX ADMIN — Medication 100 MILLIGRAM(S): at 05:33

## 2019-02-24 RX ADMIN — HUMAN INSULIN 18 UNIT(S): 100 INJECTION, SUSPENSION SUBCUTANEOUS at 17:54

## 2019-02-24 RX ADMIN — Medication 100 MILLIGRAM(S): at 13:21

## 2019-02-24 RX ADMIN — LISINOPRIL 40 MILLIGRAM(S): 2.5 TABLET ORAL at 06:19

## 2019-02-24 RX ADMIN — Medication 1 MILLIGRAM(S): at 11:26

## 2019-02-24 RX ADMIN — Medication 40 MILLIEQUIVALENT(S): at 01:11

## 2019-02-24 RX ADMIN — Medication 2: at 12:56

## 2019-02-24 RX ADMIN — CHLORHEXIDINE GLUCONATE 15 MILLILITER(S): 213 SOLUTION TOPICAL at 17:53

## 2019-02-24 RX ADMIN — SODIUM CHLORIDE 2 GRAM(S): 9 INJECTION INTRAMUSCULAR; INTRAVENOUS; SUBCUTANEOUS at 05:33

## 2019-02-24 RX ADMIN — FAMOTIDINE 20 MILLIGRAM(S): 10 INJECTION INTRAVENOUS at 17:53

## 2019-02-24 RX ADMIN — FAMOTIDINE 20 MILLIGRAM(S): 10 INJECTION INTRAVENOUS at 05:33

## 2019-02-24 RX ADMIN — SODIUM CHLORIDE 2 GRAM(S): 9 INJECTION INTRAMUSCULAR; INTRAVENOUS; SUBCUTANEOUS at 13:21

## 2019-02-24 RX ADMIN — Medication 2: at 18:45

## 2019-02-24 RX ADMIN — HUMAN INSULIN 18 UNIT(S): 100 INJECTION, SUSPENSION SUBCUTANEOUS at 06:20

## 2019-02-24 RX ADMIN — Medication 650 MILLIGRAM(S): at 20:24

## 2019-02-24 RX ADMIN — SODIUM CHLORIDE 100 MILLILITER(S): 5 INJECTION, SOLUTION INTRAVENOUS at 20:58

## 2019-02-24 RX ADMIN — Medication 100 MILLIGRAM(S): at 21:35

## 2019-02-24 RX ADMIN — AMLODIPINE BESYLATE 10 MILLIGRAM(S): 2.5 TABLET ORAL at 05:33

## 2019-02-24 RX ADMIN — CHLORHEXIDINE GLUCONATE 1 APPLICATION(S): 213 SOLUTION TOPICAL at 21:35

## 2019-02-24 RX ADMIN — Medication 650 MILLIGRAM(S): at 20:54

## 2019-02-24 RX ADMIN — Medication 100 MILLIGRAM(S): at 11:26

## 2019-02-24 RX ADMIN — Medication 2: at 00:52

## 2019-02-24 NOTE — PROGRESS NOTE ADULT - SUBJECTIVE AND OBJECTIVE BOX
HPI:  Patient is a 48 year old male with a past medical history of ETOH abuse (drinks 5 beers/day) who presented to [facility] after an episode of sudden onset b/l leg and arm weakness. Pt reports that he was helping a coworker replace a window when he had an acute onset of generalized b/l leg and arm weakness. He ambulated to the car with difficulty and had his coworker take him to [facility] ED. Pt reported when he tried to make a phone call his arms were so weak that he had difficulty keeping the phone at his ear. He also stated that when he arrived to the ED he had to be wheelchaired due to the weakness in his legs. He reports that at this time he feels like he is regaining strength in his legs and arms.     CT head performed at [facility] revealed an acute left basal ganglia ICH with cerebral edema, brain compression and mass effect. He was admitted to the ICU at [facility] where he had worsening mental status with worsening right sided hemiparesis. Repeat CT head was performed and revealed worsening basal ganglia hemorrhage, cerebral edema, and midline shift. He was placed on a Nicardipine infusion for strict blood pressure control and transferred to [facility] for further neurosurgical intervention and possible cerebral angiogram.     ICH Score 1 on admission (14 Feb 2019 18:00)    SURGERY:   PAST MEDICAL HISTORY: ETOH abuse  Jaw fracture  Cyst of parotid gland  ICH (intracerebral hemorrhage)    PAST SURGICAL HISTORY: Fracture, jaw    FAMILY HISTORY:  Family history of hypertension (Mother)    ALLERGIES: No Known Allergies    **************************************  **************************************    OVERNIGHT EVENTS: [x] None    ROS  Unobtainable due to mental status[] Negative []  Positives:    ADMISSION SCORES: GCS: HH: MF: NIHSS: RASS: CAM-ICU: ICP:    ICU Vital Signs Last 24 Hrs  T(C): 37.8 (24 Feb 2019 07:00), Max: 37.9 (23 Feb 2019 15:00)  T(F): 100 (24 Feb 2019 07:00), Max: 100.2 (23 Feb 2019 15:00)  HR: 98 (24 Feb 2019 10:00) (71 - 103)  BP: --  BP(mean): --  ABP: 148/71 (24 Feb 2019 10:00) (135/63 - 166/76)  ABP(mean): 98 (24 Feb 2019 10:00) (87 - 105)  RR: 22 (24 Feb 2019 10:00) (17 - 28)  SpO2: 99% (24 Feb 2019 10:00) (98% - 100%)     02-23 @ 07:01  -  02-24 @ 07:00  --------------------------------------------------------  IN: 3850 mL / OUT: 1825 mL / NET: 2025 mL    02-24 @ 07:01  -  02-24 @ 11:09  --------------------------------------------------------  IN: 560 mL / OUT: 0 mL / NET: 560 mL       Mode: AC/ CMV (Assist Control/ Continuous Mandatory Ventilation)  RR (machine): 14  TV (machine): 550  FiO2: 40  PEEP: 5  ITime: 1  MAP: 11  PIP: 22      DEVICES: [] Restraints [] LIZBETH/HMV []LD [] ET tube [] Trach [] Chest Tube [] A-line [] Sterling [] NGT [] Rectal Tube [] EVD [] CVL  [] ICP/LiCOx    NEUROIMAGING:     EEG REPORT:     MEDICATIONS:  acetaminophen   Tablet .. 650 milliGRAM(s) Oral every 6 hours PRN  amLODIPine   Tablet 10 milliGRAM(s) Oral daily  chlorhexidine 0.12% Liquid 15 milliLiter(s) Oral Mucosa two times a day  chlorhexidine 4% Liquid 1 Application(s) Topical <User Schedule>  dextrose 40% Gel 15 Gram(s) Oral once PRN  dextrose 5%. 1000 milliLiter(s) IV Continuous <Continuous>  dextrose 50% Injectable 12.5 Gram(s) IV Push once  dextrose 50% Injectable 25 Gram(s) IV Push once  dextrose 50% Injectable 25 Gram(s) IV Push once  enoxaparin Injectable 40 milliGRAM(s) SubCutaneous <User Schedule>  famotidine    Tablet 20 milliGRAM(s) Oral two times a day  fentaNYL    Injectable 25 MICROGram(s) IV Push every 2 hours PRN  folic acid 1 milliGRAM(s) Enteral Tube daily  glucagon  Injectable 1 milliGRAM(s) IntraMuscular once PRN  hydrALAZINE 100 milliGRAM(s) Oral every 8 hours  insulin lispro (HumaLOG) corrective regimen sliding scale   SubCutaneous every 6 hours  insulin NPH human recombinant 18 Unit(s) SubCutaneous every 6 hours  lisinopril 40 milliGRAM(s) Oral daily  oxyCODONE    IR 5 milliGRAM(s) Oral every 4 hours PRN  oxyCODONE    IR 10 milliGRAM(s) Oral every 4 hours PRN  sodium chloride 2 Gram(s) Oral every 8 hours  sodium chloride 2% . 1000 milliLiter(s) IV Continuous <Continuous>  thiamine 100 milliGRAM(s) Oral daily      PHYSICAL EXAM:  General: intubated.  Neurological:   not EO, no blink to threat, anisociria - R5 mm and L2 mm, both sluggish, not FC, LC L>R (trace), BLE trace WD  Pulmonary: Clear to Auscultation, No Rales, No Rhonchi, No Wheezes   Cardiovascular: S1, S2, Regular Rate and Rhythm   Gastrointestinal: Soft, Nontender, Nondistended   Extremities: No calf tenderness      LABS:                        11.7   8.5   )-----------( 262      ( 23 Feb 2019 23:35 )             34.4    02-24    146<H>  |  114<H>  |  13  ----------------------------<  134<H>  4.2   |  21<L>  |  0.63    Ca    8.5      24 Feb 2019 05:41  Phos  2.7     02-23  Mg     2.2     02-23     ABG - ( 23 Feb 2019 23:30 )  pH, Arterial: 7.46  pH, Blood: x     /  pCO2: 37    /  pO2: 165   / HCO3: 26    / Base Excess: 2.6   /  SaO2: 99

## 2019-02-24 NOTE — PROGRESS NOTE ADULT - SUBJECTIVE AND OBJECTIVE BOX
large L thalamic ICH c/b EtOH WD    persistent cerebral edema, MLS    vitals/labs/meds/imaging reviewed  intubated  no EO noxious, R pupil 4m sluggish, L 2mm reactive, no blinking to threat, no FC  LUE ext, RUE trace WD with nailbed stim  BLE TF R>L, L foot drop    -monitor exam for signs of intracranial hypertension  -hypertonics for Na 145-155  -mannitol prn  -monitor serum osm  -tentatively pre op for trach/peg next tue  -palliative consult per family, would like to continue GOC conversation  -euglycemia; nph+isidro  -DVT ppx  -PICC consult  -OT for LE splints    additional critical care time 35min

## 2019-02-24 NOTE — PROGRESS NOTE ADULT - ASSESSMENT
A/P:  Left BG ICH, CTA no vascular malformation, most likely HTN etiology.  ICH score 1  EtOH withdrawal.  Acute hypoxic resp distress.    Neuro:   off sedation  EEG--negative  cont thiamine + supplements  MRI done showing stable heme, no other findings on 2/17    Respiratory: likely aspiration pneumonia; PRVC - CPAP, not waking up, will monitor  pressure support as tolerated, poor mental status, would hold extubation now, reassess  CV: -160 mmhg,on hydralazine and labetalol, d/c clonidine  Endocrine: maintain euglycemia, NPH+ROZ  Renal: IVL, salt tabs 2 g q 8 hrs , Na goal 145-150, BMP now   ID: cont zosyn for asp pna x7d completed  GI: on TF, diarrhea, protonix, abdominal xray , will send cx for C.diff --negative  Heme/Onc: DVT ppx:  SCD, heparin 5000 units q 12 hrs; RUE Doppler negative  Social/Family: patient is DNR as per the discussion with the family  Discharge planning: ICU    D/W family regarding ENT/GI consult, will continue to monitor and attempt to wean to extubate over the weekend     Code Status: [] Full Code [x] DNR [] DNI [] Goals of Care PCU consult  Disposition: [x] ICU [] Stroke Unit [] RCU []PCU []Floor [] Discharge Home   family updated at bedside    Patient at high risk for neurologic deterioration, ICH expanding, seizures    critical care time, excluding procedures: 45 minutes

## 2019-02-25 DIAGNOSIS — J96.00 ACUTE RESPIRATORY FAILURE, UNSPECIFIED WHETHER WITH HYPOXIA OR HYPERCAPNIA: ICD-10-CM

## 2019-02-25 DIAGNOSIS — R60.9 EDEMA, UNSPECIFIED: ICD-10-CM

## 2019-02-25 DIAGNOSIS — G93.40 ENCEPHALOPATHY, UNSPECIFIED: ICD-10-CM

## 2019-02-25 DIAGNOSIS — Z71.89 OTHER SPECIFIED COUNSELING: ICD-10-CM

## 2019-02-25 LAB
-  AMPICILLIN/SULBACTAM: SIGNIFICANT CHANGE UP
-  CEFAZOLIN: SIGNIFICANT CHANGE UP
-  CLINDAMYCIN: SIGNIFICANT CHANGE UP
-  ERYTHROMYCIN: SIGNIFICANT CHANGE UP
-  GENTAMICIN: SIGNIFICANT CHANGE UP
-  OXACILLIN: SIGNIFICANT CHANGE UP
-  PENICILLIN: SIGNIFICANT CHANGE UP
-  RIFAMPIN: SIGNIFICANT CHANGE UP
-  TETRACYCLINE: SIGNIFICANT CHANGE UP
-  TRIMETHOPRIM/SULFAMETHOXAZOLE: SIGNIFICANT CHANGE UP
-  VANCOMYCIN: SIGNIFICANT CHANGE UP
ANION GAP SERPL CALC-SCNC: 11 MMOL/L — SIGNIFICANT CHANGE UP (ref 5–17)
ANION GAP SERPL CALC-SCNC: 11 MMOL/L — SIGNIFICANT CHANGE UP (ref 5–17)
APPEARANCE UR: CLEAR — SIGNIFICANT CHANGE UP
BACTERIA # UR AUTO: NEGATIVE — SIGNIFICANT CHANGE UP
BILIRUB UR-MCNC: NEGATIVE — SIGNIFICANT CHANGE UP
BUN SERPL-MCNC: 13 MG/DL — SIGNIFICANT CHANGE UP (ref 7–23)
BUN SERPL-MCNC: 14 MG/DL — SIGNIFICANT CHANGE UP (ref 7–23)
CALCIUM SERPL-MCNC: 9 MG/DL — SIGNIFICANT CHANGE UP (ref 8.4–10.5)
CALCIUM SERPL-MCNC: 9 MG/DL — SIGNIFICANT CHANGE UP (ref 8.4–10.5)
CHLORIDE SERPL-SCNC: 110 MMOL/L — HIGH (ref 96–108)
CHLORIDE SERPL-SCNC: 113 MMOL/L — HIGH (ref 96–108)
CO2 SERPL-SCNC: 23 MMOL/L — SIGNIFICANT CHANGE UP (ref 22–31)
CO2 SERPL-SCNC: 24 MMOL/L — SIGNIFICANT CHANGE UP (ref 22–31)
COLOR SPEC: SIGNIFICANT CHANGE UP
CREAT SERPL-MCNC: 0.59 MG/DL — SIGNIFICANT CHANGE UP (ref 0.5–1.3)
CREAT SERPL-MCNC: 0.59 MG/DL — SIGNIFICANT CHANGE UP (ref 0.5–1.3)
CULTURE RESULTS: SIGNIFICANT CHANGE UP
DIFF PNL FLD: NEGATIVE — SIGNIFICANT CHANGE UP
EPI CELLS # UR: 1 /HPF — SIGNIFICANT CHANGE UP
GAS PNL BLDA: SIGNIFICANT CHANGE UP
GLUCOSE SERPL-MCNC: 134 MG/DL — HIGH (ref 70–99)
GLUCOSE SERPL-MCNC: 136 MG/DL — HIGH (ref 70–99)
GLUCOSE UR QL: NEGATIVE — SIGNIFICANT CHANGE UP
GRAM STN FLD: SIGNIFICANT CHANGE UP
HCT VFR BLD CALC: 33.3 % — LOW (ref 39–50)
HGB BLD-MCNC: 11.4 G/DL — LOW (ref 13–17)
HYALINE CASTS # UR AUTO: 1 /LPF — SIGNIFICANT CHANGE UP (ref 0–2)
KETONES UR-MCNC: NEGATIVE — SIGNIFICANT CHANGE UP
LEUKOCYTE ESTERASE UR-ACNC: NEGATIVE — SIGNIFICANT CHANGE UP
MAGNESIUM SERPL-MCNC: 2.4 MG/DL — SIGNIFICANT CHANGE UP (ref 1.6–2.6)
MCHC RBC-ENTMCNC: 32.2 PG — SIGNIFICANT CHANGE UP (ref 27–34)
MCHC RBC-ENTMCNC: 34.2 GM/DL — SIGNIFICANT CHANGE UP (ref 32–36)
MCV RBC AUTO: 94.2 FL — SIGNIFICANT CHANGE UP (ref 80–100)
METHOD TYPE: SIGNIFICANT CHANGE UP
NITRITE UR-MCNC: NEGATIVE — SIGNIFICANT CHANGE UP
ORGANISM # SPEC MICROSCOPIC CNT: SIGNIFICANT CHANGE UP
ORGANISM # SPEC MICROSCOPIC CNT: SIGNIFICANT CHANGE UP
PH UR: 6 — SIGNIFICANT CHANGE UP (ref 5–8)
PHOSPHATE SERPL-MCNC: 3.2 MG/DL — SIGNIFICANT CHANGE UP (ref 2.5–4.5)
PLATELET # BLD AUTO: 268 K/UL — SIGNIFICANT CHANGE UP (ref 150–400)
POTASSIUM SERPL-MCNC: 3.7 MMOL/L — SIGNIFICANT CHANGE UP (ref 3.5–5.3)
POTASSIUM SERPL-MCNC: 3.9 MMOL/L — SIGNIFICANT CHANGE UP (ref 3.5–5.3)
POTASSIUM SERPL-SCNC: 3.7 MMOL/L — SIGNIFICANT CHANGE UP (ref 3.5–5.3)
POTASSIUM SERPL-SCNC: 3.9 MMOL/L — SIGNIFICANT CHANGE UP (ref 3.5–5.3)
PROT UR-MCNC: ABNORMAL
RBC # BLD: 3.54 M/UL — LOW (ref 4.2–5.8)
RBC # FLD: 12.3 % — SIGNIFICANT CHANGE UP (ref 10.3–14.5)
RBC CASTS # UR COMP ASSIST: 5 /HPF — SIGNIFICANT CHANGE UP (ref 0–4)
SODIUM SERPL-SCNC: 145 MMOL/L — SIGNIFICANT CHANGE UP (ref 135–145)
SODIUM SERPL-SCNC: 147 MMOL/L — HIGH (ref 135–145)
SP GR SPEC: 1.02 — SIGNIFICANT CHANGE UP (ref 1.01–1.02)
SPECIMEN SOURCE: SIGNIFICANT CHANGE UP
SPECIMEN SOURCE: SIGNIFICANT CHANGE UP
UROBILINOGEN FLD QL: NEGATIVE — SIGNIFICANT CHANGE UP
WBC # BLD: 8.5 K/UL — SIGNIFICANT CHANGE UP (ref 3.8–10.5)
WBC # FLD AUTO: 8.5 K/UL — SIGNIFICANT CHANGE UP (ref 3.8–10.5)
WBC UR QL: 1 /HPF — SIGNIFICANT CHANGE UP (ref 0–5)

## 2019-02-25 PROCEDURE — 99233 SBSQ HOSP IP/OBS HIGH 50: CPT

## 2019-02-25 PROCEDURE — 99223 1ST HOSP IP/OBS HIGH 75: CPT

## 2019-02-25 PROCEDURE — 99232 SBSQ HOSP IP/OBS MODERATE 35: CPT

## 2019-02-25 PROCEDURE — 99291 CRITICAL CARE FIRST HOUR: CPT

## 2019-02-25 PROCEDURE — 71045 X-RAY EXAM CHEST 1 VIEW: CPT | Mod: 26

## 2019-02-25 RX ORDER — ROBINUL 0.2 MG/ML
0.4 INJECTION INTRAMUSCULAR; INTRAVENOUS EVERY 6 HOURS
Qty: 0 | Refills: 0 | Status: DISCONTINUED | OUTPATIENT
Start: 2019-02-25 | End: 2019-02-26

## 2019-02-25 RX ORDER — SODIUM CHLORIDE 9 MG/ML
1000 INJECTION INTRAMUSCULAR; INTRAVENOUS; SUBCUTANEOUS
Qty: 0 | Refills: 0 | Status: DISCONTINUED | OUTPATIENT
Start: 2019-02-25 | End: 2019-02-26

## 2019-02-25 RX ORDER — POTASSIUM CHLORIDE 20 MEQ
40 PACKET (EA) ORAL ONCE
Qty: 0 | Refills: 0 | Status: COMPLETED | OUTPATIENT
Start: 2019-02-25 | End: 2019-02-25

## 2019-02-25 RX ORDER — HYDROMORPHONE HYDROCHLORIDE 2 MG/ML
0.5 INJECTION INTRAMUSCULAR; INTRAVENOUS; SUBCUTANEOUS
Qty: 0 | Refills: 0 | Status: DISCONTINUED | OUTPATIENT
Start: 2019-02-25 | End: 2019-02-26

## 2019-02-25 RX ORDER — HYDROMORPHONE HYDROCHLORIDE 2 MG/ML
0.5 INJECTION INTRAMUSCULAR; INTRAVENOUS; SUBCUTANEOUS ONCE
Qty: 0 | Refills: 0 | Status: DISCONTINUED | OUTPATIENT
Start: 2019-02-25 | End: 2019-02-26

## 2019-02-25 RX ORDER — ACETAMINOPHEN 500 MG
650 TABLET ORAL EVERY 6 HOURS
Qty: 0 | Refills: 0 | Status: DISCONTINUED | OUTPATIENT
Start: 2019-02-25 | End: 2019-02-26

## 2019-02-25 RX ORDER — HYDRALAZINE HCL 50 MG
10 TABLET ORAL ONCE
Qty: 0 | Refills: 0 | Status: COMPLETED | OUTPATIENT
Start: 2019-02-25 | End: 2019-02-25

## 2019-02-25 RX ADMIN — Medication 650 MILLIGRAM(S): at 04:17

## 2019-02-25 RX ADMIN — ROBINUL 0.4 MILLIGRAM(S): 0.2 INJECTION INTRAMUSCULAR; INTRAVENOUS at 18:04

## 2019-02-25 RX ADMIN — Medication 1 MILLIGRAM(S): at 12:07

## 2019-02-25 RX ADMIN — Medication 100 MILLIGRAM(S): at 05:47

## 2019-02-25 RX ADMIN — Medication 10 MILLIGRAM(S): at 01:18

## 2019-02-25 RX ADMIN — Medication 650 MILLIGRAM(S): at 13:00

## 2019-02-25 RX ADMIN — Medication 650 MILLIGRAM(S): at 12:07

## 2019-02-25 RX ADMIN — Medication 1 MILLIGRAM(S): at 17:48

## 2019-02-25 RX ADMIN — HUMAN INSULIN 18 UNIT(S): 100 INJECTION, SUSPENSION SUBCUTANEOUS at 05:56

## 2019-02-25 RX ADMIN — Medication 650 MILLIGRAM(S): at 21:03

## 2019-02-25 RX ADMIN — FENTANYL CITRATE 25 MICROGRAM(S): 50 INJECTION INTRAVENOUS at 10:00

## 2019-02-25 RX ADMIN — HUMAN INSULIN 18 UNIT(S): 100 INJECTION, SUSPENSION SUBCUTANEOUS at 12:07

## 2019-02-25 RX ADMIN — SODIUM CHLORIDE 2 GRAM(S): 9 INJECTION INTRAMUSCULAR; INTRAVENOUS; SUBCUTANEOUS at 05:47

## 2019-02-25 RX ADMIN — CHLORHEXIDINE GLUCONATE 15 MILLILITER(S): 213 SOLUTION TOPICAL at 05:46

## 2019-02-25 RX ADMIN — AMLODIPINE BESYLATE 10 MILLIGRAM(S): 2.5 TABLET ORAL at 05:55

## 2019-02-25 RX ADMIN — Medication 100 MILLIGRAM(S): at 12:07

## 2019-02-25 RX ADMIN — Medication 650 MILLIGRAM(S): at 04:47

## 2019-02-25 RX ADMIN — Medication 40 MILLIEQUIVALENT(S): at 04:17

## 2019-02-25 RX ADMIN — HYDROMORPHONE HYDROCHLORIDE 0.5 MILLIGRAM(S): 2 INJECTION INTRAMUSCULAR; INTRAVENOUS; SUBCUTANEOUS at 21:05

## 2019-02-25 RX ADMIN — FENTANYL CITRATE 25 MICROGRAM(S): 50 INJECTION INTRAVENOUS at 10:15

## 2019-02-25 RX ADMIN — FAMOTIDINE 20 MILLIGRAM(S): 10 INJECTION INTRAVENOUS at 05:47

## 2019-02-25 RX ADMIN — HYDROMORPHONE HYDROCHLORIDE 0.5 MILLIGRAM(S): 2 INJECTION INTRAMUSCULAR; INTRAVENOUS; SUBCUTANEOUS at 17:48

## 2019-02-25 RX ADMIN — LISINOPRIL 40 MILLIGRAM(S): 2.5 TABLET ORAL at 05:47

## 2019-02-25 NOTE — CONSULT NOTE ADULT - SUBJECTIVE AND OBJECTIVE BOX
Patient is a 48y old  Male who presented with a chief complaint of Left basal ganglia hemorrhage (22 Feb 2019 11:43)      HPI:  Patient is a 48 year old male with a past medical history of ETOH abuse (drinks 5 beers/day) who presented to [facility] after an episode of sudden onset b/l leg and arm weakness. Pt reports that he was helping a coworker replace a window when he had an acute onset of generalized b/l leg and arm weakness. He ambulated to the car with difficulty and had his coworker take him to [facility] ED. Pt reported when he tried to make a phone call his arms were so weak that he had difficulty keeping the phone at his ear. He also stated that when he arrived to the ED he had to be wheelchaired due to the weakness in his legs. He reports that at this time he feels like he is regaining strength in his legs and arms.     CT head performed at [facility] revealed an acute left basal ganglia ICH with cerebral edema, brain compression and mass effect. He was admitted to the ICU at [facility] where he had worsening mental status with worsening right sided hemiparesis. Repeat CT head was performed and revealed worsening basal ganglia hemorrhage, cerebral edema, and midline shift. He was placed on a Nicardipine infusion for strict blood pressure control and transferred to [facility] for further neurosurgical intervention and possible cerebral angiogram.     ICH Score 1 on admission (14 Feb 2019 18:00)    CTA no vascular malformation, most likely HTN etiology.  ICH score 1  EtOH withdrawal.  Acute hypoxic resp distress, not able to wean off vent  We are asked to evaluate for PEG placement    PAST MEDICAL & SURGICAL HISTORY:  ETOH abuse  Jaw fracture  Cyst of parotid gland: Left x 15 years  ICH (intracerebral hemorrhage)  Fracture, jaw: with surgical repair      Allergies  No Known Allergies      MEDICATIONS  (STANDING):  amLODIPine   Tablet 10 milliGRAM(s) Oral daily  chlorhexidine 4% Liquid 1 Application(s) Topical <User Schedule>  dextrose 5%. 1000 milliLiter(s) (50 mL/Hr) IV Continuous <Continuous>  dextrose 50% Injectable 12.5 Gram(s) IV Push once  dextrose 50% Injectable 25 Gram(s) IV Push once  dextrose 50% Injectable 25 Gram(s) IV Push once  enoxaparin Injectable 40 milliGRAM(s) SubCutaneous <User Schedule>  famotidine    Tablet 20 milliGRAM(s) Oral two times a day  folic acid 1 milliGRAM(s) Enteral Tube daily  hydrALAZINE 100 milliGRAM(s) Oral every 8 hours  insulin lispro (HumaLOG) corrective regimen sliding scale   SubCutaneous every 6 hours  insulin NPH human recombinant 18 Unit(s) SubCutaneous every 6 hours  lisinopril 40 milliGRAM(s) Oral daily  sodium chloride 2 Gram(s) Oral every 8 hours  thiamine 100 milliGRAM(s) Oral daily    MEDICATIONS  (PRN):  acetaminophen   Tablet .. 650 milliGRAM(s) Oral every 6 hours PRN Temp greater or equal to 38C (100.4F), Mild Pain (1 - 3)  dextrose 40% Gel 15 Gram(s) Oral once PRN Blood Glucose LESS THAN 70 milliGRAM(s)/deciLiter  fentaNYL    Injectable 25 MICROGram(s) IV Push every 2 hours PRN agitaion  glucagon  Injectable 1 milliGRAM(s) IntraMuscular once PRN Glucose <70 milliGRAM(s)/deciLiter  oxyCODONE    IR 5 milliGRAM(s) Oral every 4 hours PRN Moderate Pain (4 - 6)  oxyCODONE    IR 10 milliGRAM(s) Oral every 4 hours PRN Severe Pain (7 - 10)      Social History:  +ETOH - see HPI  lives with son    Family History   IBD (  ) Yes   ( X ) No  GI Malignancy (  )  Yes    (X  ) No    Health Management  Last Colonoscopy - none      Advanced Directives: (   X  ) None    (      ) DNR    (     ) DNI    (     ) Health Care Proxy:     Review of Systems:    unable to obtain from pt, history from chart and pt's mother at bedside      Vital Signs Last 24 Hrs  T(C): 37.7 (22 Feb 2019 15:00), Max: 38 (21 Feb 2019 23:00)  T(F): 99.9 (22 Feb 2019 15:00), Max: 100.4 (21 Feb 2019 23:00)  HR: 59 (22 Feb 2019 15:00) (48 - 82)  BP: --  BP(mean): --  RR: 20 (22 Feb 2019 15:00) (14 - 23)  SpO2: 100% (22 Feb 2019 15:00) (98% - 100%)    PHYSICAL EXAM:    Constitutional: orally intubated, non responsive +NGT  Neck: supple  Respiratory: grossly clear anteriorly  Cardiovascular: S1 and S2, no murmur  Gastrointestinal: BS+, soft, NT/ND, neg HSM,  Extremities: No peripheral edema, neg clubbing, cyanosis  Vascular: 2+ peripheral pulses  Neurological: sedated, unresponsive  Skin: No rashes        LABS:                        11.4   7.5   )-----------( 207      ( 21 Feb 2019 23:10 )             32.7     02-21    142  |  110<H>  |  12  ----------------------------<  127<H>  3.7   |  20<L>  |  0.67    Ca    9.0      21 Feb 2019 23:10  Phos  2.2     02-21  Mg     2.4     02-21    TPro  6.3  /  Alb  3.1<L>  /  TBili  0.5  /  DBili  0.2  /  AST  21  /  ALT  35  /  AlkPhos  50  02-21        RADIOLOGY & ADDITIONAL TESTS:  < from: CT Head No Cont (02.21.19 @ 09:19) >  FINDINGS:    Stable appearance of the left basal ganglia hemorrhage with surrounding   edema.  Similar rightward midline shift.    Slight increase in the dilation of the right occipital horn since   2/18/2019 suggesting increased hydrocephalus.    The calvarium is intact.     Opacification of the ethmoid air cells, the left sphenoid sinus, left   frontal sinus, and left maxillarysinus. Partial opacification of the   right sphenoid sinus and right frontal sinus.    Redemonstrated left parotid mass with central calcifications.    IMPRESSION:   Redemonstrated left basal ganglia hemorrhage, with unchanged mass effect.  Increase in size of the right occipital horn since 2/18/2019 suggesting   increased hydrocephalus.    These findings were discussed with Dr. Oconnor at 2/21/2019 10:17 AM by   Dr. Winters with read back confirmation.
CC: trach eval     HPI:  Patient is a 48y old  Male who presents with a chief complaint of Left basal ganglia hemorrhage with respiratory failure, unable to extubate. ENT called for trach eval.       PAST MEDICAL & SURGICAL HISTORY:  ETOH abuse  Jaw fracture  Cyst of parotid gland: Left x 15 years  ICH (intracerebral hemorrhage)  Fracture, jaw: with surgical repair    Allergies    No Known Allergies    Intolerances      MEDICATIONS  (STANDING):  amLODIPine   Tablet 10 milliGRAM(s) Oral daily  chlorhexidine 4% Liquid 1 Application(s) Topical <User Schedule>  dextrose 5%. 1000 milliLiter(s) (50 mL/Hr) IV Continuous <Continuous>  dextrose 50% Injectable 12.5 Gram(s) IV Push once  dextrose 50% Injectable 25 Gram(s) IV Push once  dextrose 50% Injectable 25 Gram(s) IV Push once  enoxaparin Injectable 40 milliGRAM(s) SubCutaneous <User Schedule>  famotidine    Tablet 20 milliGRAM(s) Oral two times a day  folic acid 1 milliGRAM(s) Enteral Tube daily  hydrALAZINE 100 milliGRAM(s) Oral every 8 hours  insulin lispro (HumaLOG) corrective regimen sliding scale   SubCutaneous every 6 hours  insulin NPH human recombinant 18 Unit(s) SubCutaneous every 6 hours  lisinopril 40 milliGRAM(s) Oral daily  sodium chloride 2 Gram(s) Oral every 8 hours  sodium phosphate IVPB 15 milliMole(s) IV Intermittent once  thiamine 100 milliGRAM(s) Oral daily    MEDICATIONS  (PRN):  acetaminophen   Tablet .. 650 milliGRAM(s) Oral every 6 hours PRN Temp greater or equal to 38C (100.4F), Mild Pain (1 - 3)  dextrose 40% Gel 15 Gram(s) Oral once PRN Blood Glucose LESS THAN 70 milliGRAM(s)/deciLiter  fentaNYL    Injectable 25 MICROGram(s) IV Push every 2 hours PRN agitaion  glucagon  Injectable 1 milliGRAM(s) IntraMuscular once PRN Glucose <70 milliGRAM(s)/deciLiter  oxyCODONE    IR 5 milliGRAM(s) Oral every 4 hours PRN Moderate Pain (4 - 6)  oxyCODONE    IR 10 milliGRAM(s) Oral every 4 hours PRN Severe Pain (7 - 10)      Social History: + etoh abuse     Family history: Pt denies any sign FHx    ROS:   ENT: all negative except as noted in HPI   CV: denies palpitations  Pulm: denies SOB, cough, hemoptysis  GI: denies change in apetite, indigestion, n/v  : denies pertinent urinary symptoms, urgency  Neuro: denies numbness/tingling, loss of sensation  Psych: denies anxiety  MS: denies muscle weakness, instability  Heme: denies easy bruising or bleeding  Endo: denies heat/cold intolerance, excessive sweating  Vascular: denies LE edema    Vital Signs Last 24 Hrs  T(C): 37.5 (22 Feb 2019 07:00), Max: 38 (21 Feb 2019 23:00)  T(F): 99.5 (22 Feb 2019 07:00), Max: 100.4 (21 Feb 2019 23:00)  HR: 60 (22 Feb 2019 10:05) (48 - 76)  BP: --  BP(mean): --  RR: 17 (22 Feb 2019 09:00) (14 - 21)  SpO2: 100% (22 Feb 2019 10:05) (98% - 100%)                          11.4   7.5   )-----------( 207      ( 21 Feb 2019 23:10 )             32.7    02-21    142  |  110<H>  |  12  ----------------------------<  127<H>  3.7   |  20<L>  |  0.67    Ca    9.0      21 Feb 2019 23:10  Phos  2.2     02-21  Mg     2.4     02-21    TPro  6.3  /  Alb  3.1<L>  /  TBili  0.5  /  DBili  0.2  /  AST  21  /  ALT  35  /  AlkPhos  50  02-21       PHYSICAL EXAM:  Gen: NAD  Skin: No rashes, bruises, or lesions  Head: Normocephalic, Atraumatic  Face: no edema, erythema, or fluctuance. Parotid glands soft without mass  Eyes: no scleral injection  Nose: Nares bilaterally patent, no discharge  Mouth: ET tube in place   Mucosa moist, tongue/uvula midline,   Neck: Flat, supple, no lymphadenopathy, trachea midline, no masses  Lymphatic: No lymphadenopathy  CV: no peripheral edema/cyanosis  GI: nondistended   Peripheral vascular: no JVD or edema  Neuro: facial nerve intact, no facial droop
HPI:  Patient is a 48 year old male with a past medical history of ETOH abuse (drinks 5 beers/day) who presented to [facility] after an episode of sudden onset b/l leg and arm weakness. Pt reports that he was helping a coworker replace a window when he had an acute onset of generalized b/l leg and arm weakness. He ambulated to the car with difficulty and had his coworker take him to [facility] ED. Pt reported when he tried to make a phone call his arms were so weak that he had difficulty keeping the phone at his ear. He also stated that when he arrived to the ED he had to be wheelchaired due to the weakness in his legs. He reports that at this time he feels like he is regaining strength in his legs and arms.     CT head performed at [facility] revealed an acute left basal ganglia ICH with cerebral edema, brain compression and mass effect. He was admitted to the ICU at [facility] where he had worsening mental status with worsening right sided hemiparesis. Repeat CT head was performed and revealed worsening basal ganglia hemorrhage, cerebral edema, and midline shift. He was placed on a Nicardipine infusion for strict blood pressure control and transferred to [facility] for further neurosurgical intervention and possible cerebral angiogram.     ICH Score 1 on admission (2019 18:00)    PERTINENT PM/SXH:   ETOH abuse  Jaw fracture  Cyst of parotid gland  ICH (intracerebral hemorrhage)    Fracture, jaw    FAMILY HISTORY:  Family history of hypertension (Mother)    ITEMS NOT CHECKED ARE NOT PRESENT    SOCIAL HISTORY:   Significant other/partner:  [x ]  Children:  [ x]  Scientology/Spirituality: Gnosticism   Substance hx:  [ ]   Tobacco hx:  [ ]   Alcohol hx: [x ]   Home Opioid hx:  [ ] I-Stop Reference No:  Living Situation: [x ]Home  [ ]Long term care  [ ]Rehab [ ]Other    ADVANCE DIRECTIVES:    DNR  Yes  Yes  MOLST  [ ]  Living Will  [ ]   DECISION MAKER(s):  [ ] Health Care Proxy(s)  [x ] Surrogate(s)  [ ] Guardian           Name(s): Phone Number(s):  Yvonne Way - Mother - [phone]  BASELINE (I)ADL(s) (prior to admission):  [place]: [ x]Total  [ ] Moderate [ ]Dependent    Allergies    No Known Allergies    Intolerances    MEDICATIONS  (STANDING):  amLODIPine   Tablet 10 milliGRAM(s) Oral daily  chlorhexidine 0.12% Liquid 15 milliLiter(s) Oral Mucosa two times a day  chlorhexidine 4% Liquid 1 Application(s) Topical <User Schedule>  clindamycin IVPB 600 milliGRAM(s) IV Intermittent every 8 hours  dextrose 5%. 1000 milliLiter(s) (50 mL/Hr) IV Continuous <Continuous>  dextrose 50% Injectable 12.5 Gram(s) IV Push once  dextrose 50% Injectable 25 Gram(s) IV Push once  dextrose 50% Injectable 25 Gram(s) IV Push once  enoxaparin Injectable 40 milliGRAM(s) SubCutaneous <User Schedule>  famotidine    Tablet 20 milliGRAM(s) Oral two times a day  folic acid 1 milliGRAM(s) Enteral Tube daily  hydrALAZINE 100 milliGRAM(s) Oral every 8 hours  insulin lispro (HumaLOG) corrective regimen sliding scale   SubCutaneous every 6 hours  insulin NPH human recombinant 18 Unit(s) SubCutaneous every 6 hours  lisinopril 40 milliGRAM(s) Oral daily  sodium chloride 2 Gram(s) Oral every 8 hours  sodium chloride 2% . 1000 milliLiter(s) (100 mL/Hr) IV Continuous <Continuous>  thiamine 100 milliGRAM(s) Oral daily    MEDICATIONS  (PRN):  acetaminophen   Tablet .. 650 milliGRAM(s) Oral every 6 hours PRN Temp greater or equal to 38C (100.4F), Mild Pain (1 - 3)  dextrose 40% Gel 15 Gram(s) Oral once PRN Blood Glucose LESS THAN 70 milliGRAM(s)/deciLiter  fentaNYL    Injectable 25 MICROGram(s) IV Push every 2 hours PRN agitaion  glucagon  Injectable 1 milliGRAM(s) IntraMuscular once PRN Glucose <70 milliGRAM(s)/deciLiter  oxyCODONE    IR 5 milliGRAM(s) Oral every 4 hours PRN Moderate Pain (4 - 6)  oxyCODONE    IR 10 milliGRAM(s) Oral every 4 hours PRN Severe Pain (7 - 10)    PRESENT SYMPTOMS: [x ]Unable to obtain due to poor mentation   Source if other than patient:  [ ]Family   [ ]Team     Pain (Impact on QOL):    Location -         Minimal acceptable level (0-10 scale):                    Aggrevating factors -  Quality -  Radiation -  Severity (0-10 scale) -    Timing -    PAIN AD Score: 0    [email or web address] (press ctrl +  left click to view)    Dyspnea:                           [ ]Mild [ ]Moderate [ ]Severe  Anxiety:                             [ ]Mild [ ]Moderate [ ]Severe  Fatigue:                             [ ]Mild [ ]Moderate [ ]Severe  Nausea:                             [ ]Mild [ ]Moderate [ ]Severe  Loss of appetite:              [ ]Mild [ ]Moderate [ ]Severe  Constipation:                    [ ]Mild [ ]Moderate [ ]Severe    Other Symptoms:  [x ]All other review of systems negative     Karnofsky Performance Score/Palliative Performance Status Version 2:    10     %  PHYSICAL EXAM:  Vital Signs Last 24 Hrs  T(C): 37.6 (2019 11:00), Max: 38.3 (2019 04:00)  T(F): 99.7 (2019 11:00), Max: 100.9 (2019 04:00)  HR: 91 (2019 11:00) (53 - 98)  BP: --  BP(mean): --  RR: 19 (:00) (16 - 23)  SpO2: 100% (2019 11:00) (97% - 100%) I&O's Summary    2019 07:  -  2019 07:00  --------------------------------------------------------  IN: 4603 mL / OUT: 2750 mL / NET: 1853 mL    2019 07:01  -  2019 11:22  --------------------------------------------------------  IN: 660 mL / OUT: 575 mL / NET: 85 mL    GENERAL:  [ ]Alert  [ ]Oriented x   [ ]Lethargic  [ ]Cachexia  [x ]Unarousable  [ ]Verbal  [x ]Non-Verbal  Behavioral:   [ ] Anxiety  [ ] Delirium [ ] Agitation [ ] Other  HEENT:  [ ]Normal   [ ]Dry mouth   [x ]ET Tube/Trach  [ ]Oral lesions  PULMONARY: VENTED   [ ]Clear [ ]Tachypnea  [ ]Audible excessive secretions   [ ]Rhonchi        [ ]Right [ ]Left [ ]Bilateral  [ ]Crackles        [ ]Right [ ]Left [ ]Bilateral  [ ]Wheezing     [ ]Right [ ]Left [ ]Bilateral  CARDIOVASCULAR:    [X ]Regular [ ]Irregular [X ]Tachy  [ ]Jj [ ]Murmur [ ]Other  GASTROINTESTINAL:  [X ]Soft  [ ]Distended   [X ]+BS  [ ]Non tender [ ]Tender  [ ]PEG [ X]OGT/ NGT  Last BM:   19 @ 07:01  -  19 @ 07:00  --------------------------------------------------------  OUT: 400 mL    19 @ 07:01  -  19 @ 07:00  --------------------------------------------------------  OUT: 0 mL    19 @ 07:01  -  19 @ 07:00  --------------------------------------------------------  OUT: 25 mL    19 @ 07:01  -  19 @ 07:00  --------------------------------------------------------  OUT: 25 mL    GENITOURINARY:  [ ]Normal [ ] Incontinent   [ ]Oliguria/Anuria   [X ][place]  MUSCULOSKELETAL:   [ ]Normal   [ ]Weakness  [ X]Bed/Wheelchair bound [ ]Edema  NEUROLOGIC:   [ ]No focal deficits  [X ] Cognitive impairment  [ ] Dysphagia [ ]Dysarthria [ ] Paresis [ ]Other   SKIN:   [X ]Normal   [ ]Pressure ulcer(s)  [ ]Rash    CRITICAL CARE:  [ ] Shock Present  [ ]Septic [ ]Cardiogenic [X ]Neurologic [ ]Hypovolemic  [ ]  Vasopressors [ ]  Inotropes   X[ ] Respiratory failure present  [X ] Acute  [ ] Chronic [ ] Hypoxic  [ ] Hypercarbic [ ] Other  [ ] Other organ failure     LABS:                        11.4   8.5   )-----------( 268      ( 2019 01:30 )             33.3       147<H>  |  113<H>  |  14  ----------------------------<  136<H>  3.9   |  23  |  0.59    Ca    9.0      2019 07:21  Phos  3.2       Mg     2.4             Urinalysis Basic - ( 2019 06:18 )    Color: Light Yellow / Appearance: Clear / S.023 / pH: x  Gluc: x / Ketone: Negative  / Bili: Negative / Urobili: Negative   Blood: x / Protein: 30 mg/dL / Nitrite: Negative   Leuk Esterase: Negative / RBC: 5 /hpf / WBC 1 /HPF   Sq Epi: x / Non Sq Epi: 1 /hpf / Bacteria: Negative      RADIOLOGY & ADDITIONAL STUDIES:    INTERPRETATION:  CLINICAL INFORMATION: Follow-up left basal ganglia   hemorrhage/cerebral edema.    TECHNIQUE: Noncontrast axial CT images were acquired through the head.    COMPARISON STUDY: CT head 2019.    FINDINGS:     Redemonstration of left basal ganglia hemorrhage measuring approximately   4.1 x 4.2 cm. There is adjacent vasogenic edema with mass effect on the   left lateral ventricle. A right to left midline shift measuring up to 1   cm at the level of the septum pellucidum. These findings are similar in   appearance to prior study. Right lateral ventricle remains dilated.   Effacement of basilar cistern is similar in appearance to prior study.    No new areas of intracranial hemorrhage.    Partially imaged endotracheal tube and enteric tube.    IMPRESSION:     Left basal ganglia hemorrhage with adjacent vasogenic edema with mass   effect on the left lateral ventricle and a rightward midline shift   associated with effacement of the basilar cistern is similar in   appearance to prior study.    No new areas of intracranial hemorrhage or worsening hydrocephalus.        PROTEIN CALORIE MALNUTRITION:   [ ] PPSV2 < or = to 30% [ ] significant weight loss  [ ] poor nutritional intake [ ] catabolic state [ ] anasarca     Albumin, Serum: 3.1 g/dL (19 @ 08:44)  Artificial Nutrition [ ]     REFERRALS:   [X ]Chaplaincy  [ ] Hospice  [ ]Child Life  [X ]Social Work  [ ]Case management [ ]Holistic Therapy   Goals of Care Discussion Document:
HPI:  Pt is a 48 year old male with a past medical history of ETOH abuse (drinks 12 beers/day) who is admitted for a left basal ganglia hemorrhage requiring intubation due to likely alcohol withdrawal complicated by PNA. NIHSS: 31. ICH Score 1.      MEDICATIONS  (STANDING):  chlordiazePOXIDE 25 milliGRAM(s) Oral every 8 hours  chlorhexidine 4% Liquid 1 Application(s) Topical <User Schedule>  dexmedetomidine Infusion 0.2 MICROgram(s)/kG/Hr (4.515 mL/Hr) IV Continuous <Continuous>  famotidine    Tablet 20 milliGRAM(s) Oral two times a day  folic acid 1 milliGRAM(s) Enteral Tube daily  haloperidol    Injectable 2.5 milliGRAM(s) IV Push once  labetalol 400 milliGRAM(s) Enteral Tube every 8 hours  multivitamin 1 Tablet(s) Oral daily  niCARdipine Infusion 5 mG/Hr (25 mL/Hr) IV Continuous <Continuous>  PHENobarbital Elixir 30 milliGRAM(s) Oral every 8 hours  piperacillin/tazobactam IVPB. 3.375 Gram(s) IV Intermittent every 8 hours  sodium chloride 2 Gram(s) Oral every 8 hours  sodium chloride 2% . 1000 milliLiter(s) (75 mL/Hr) IV Continuous <Continuous>  thiamine 100 milliGRAM(s) Oral daily  vancomycin  IVPB 1000 milliGRAM(s) IV Intermittent every 12 hours  vancomycin  IVPB        MEDICATIONS  (PRN):  acetaminophen   Tablet .. 650 milliGRAM(s) Oral every 6 hours PRN Temp greater or equal to 38C (100.4F), Mild Pain (1 - 3)  LORazepam   Injectable 2 milliGRAM(s) IntraMuscular every 2 hours PRN Agitation  oxyCODONE    IR 5 milliGRAM(s) Oral every 4 hours PRN Moderate Pain (4 - 6)  oxyCODONE    IR 10 milliGRAM(s) Oral every 4 hours PRN Severe Pain (7 - 10)    PAST MEDICAL & SURGICAL HISTORY:  ETOH abuse  Jaw fracture  Cyst of parotid gland: Left x 15 years  ICH (intracerebral hemorrhage)  Fracture, jaw: with surgical repair    FAMILY HISTORY:  Family history of hypertension (Mother)    Allergies    No Known Allergies    Intolerances    SHx - No smoking, No ETOH, No drug abuse    Review of Systems:  See HPI.    Vital Signs Last 24 Hrs  T(C): 36.7 (17 Feb 2019 11:00), Max: 38.4 (16 Feb 2019 15:00)  T(F): 98.1 (17 Feb 2019 11:00), Max: 101.2 (16 Feb 2019 15:00)  HR: 96 (17 Feb 2019 11:23) (83 - 107)  BP: --  BP(mean): --  RR: 22 (17 Feb 2019 10:00) (15 - 23)  SpO2: 100% (17 Feb 2019 11:23) (99% - 100%)      General Exam:   General appearance: No acute distress, intubated and sedated.              Neurological Exam:  Mental Status: Eyes closed, does not open to command. Does not follow any commands. Withdraws to noxious stimuli on the left.  Cranial Nerves: PERRL, Oculocephalics present, no blink to threat b/l, no nystagmus. No gross facial asymmetry. Gag reflex present. Corneal reflex present.    Motor:   Tone: decreased in the RUE and increased in the RLE        Strength: Has spontaneous movements on the left side only, nothing on the right  Tremor: No resting, postural or action tremor.  No myoclonus.    Toes mute on the right and downgoing on the left      02-17    147<H>  |  117<H>  |  7   ----------------------------<  216<H>  3.8   |  19<L>  |  0.72    Ca    8.5      17 Feb 2019 10:20  Phos  1.5     02-17  Mg     2.1     02-17    TPro  6.7  /  Alb  3.8  /  TBili  0.8  /  DBili  x   /  AST  16  /  ALT  22  /  AlkPhos  49  02-16               14.1   9.2   )-----------( 153      ( 16 Feb 2019 03:38 )             39.9
p (0257)     HPI:  48 year old man unknown PMH transferred from outside hospital for left ICH. He was admitted to the ICU and had worsening weakness, repeat CT head showed increase in ICH. He drinks 6 beers a day, not in the morning  doesn't take any medications at home    Imaging:  L BG heme, read as expanding bleed but likely mostly contrast extrav from CTA    Exam:  Awake, alert, Aphasic, regards, mimics / intermittently FC on L side strongly AG, TF RLE, RUE nothing. PERRL      --Anticoagulation:    =====================  PAST MEDICAL HISTORY   Jaw fracture  Cyst of parotid gland  ICH (intracerebral hemorrhage)    PAST SURGICAL HISTORY   Fracture, jaw        MEDICATIONS:  Antibiotics:    Neuro:    Other:      SOCIAL HISTORY:   Occupation:   Marital Status:     FAMILY HISTORY:  Family history of hypertension (Mother)      ROS: Negative except per HPI    LABS:  PT/INR - ( 14 Feb 2019 10:09 )   PT: 10.6 sec;   INR: 0.92 ratio         PTT - ( 14 Feb 2019 10:09 )  PTT:26.4 sec                        14.8   4.2   )-----------( 150      ( 14 Feb 2019 10:09 )             42.7     02-14    138  |  99  |  8.0  ----------------------------<  184<H>  3.9   |  27.0  |  0.71    Ca    9.2      14 Feb 2019 10:09    TPro  7.9  /  Alb  4.7  /  TBili  0.4  /  DBili  x   /  AST  30  /  ALT  39  /  AlkPhos  68  02-14

## 2019-02-25 NOTE — PROGRESS NOTE ADULT - SUBJECTIVE AND OBJECTIVE BOX
Patient is a 48y old  Male who presented with a chief complaint of Left basal ganglia hemorrhage (2019 09:14)      INTERVAL HPI/OVERNIGHT EVENTS:  toelrating NGTF over weekend per report  family has now decided on DNR - do not want to proceed with trach or PEG  awaiting transfer to PCU    MEDICATIONS  (STANDING):  amLODIPine   Tablet 10 milliGRAM(s) Oral daily  chlorhexidine 0.12% Liquid 15 milliLiter(s) Oral Mucosa two times a day  chlorhexidine 4% Liquid 1 Application(s) Topical <User Schedule>  clindamycin IVPB 600 milliGRAM(s) IV Intermittent every 8 hours  dextrose 5%. 1000 milliLiter(s) (50 mL/Hr) IV Continuous <Continuous>  dextrose 50% Injectable 12.5 Gram(s) IV Push once  dextrose 50% Injectable 25 Gram(s) IV Push once  dextrose 50% Injectable 25 Gram(s) IV Push once  enoxaparin Injectable 40 milliGRAM(s) SubCutaneous <User Schedule>  famotidine    Tablet 20 milliGRAM(s) Oral two times a day  folic acid 1 milliGRAM(s) Enteral Tube daily  hydrALAZINE 100 milliGRAM(s) Oral every 8 hours  insulin lispro (HumaLOG) corrective regimen sliding scale   SubCutaneous every 6 hours  insulin NPH human recombinant 18 Unit(s) SubCutaneous every 6 hours  lisinopril 40 milliGRAM(s) Oral daily  sodium chloride 2 Gram(s) Oral every 8 hours  sodium chloride 2% . 1000 milliLiter(s) (100 mL/Hr) IV Continuous <Continuous>  thiamine 100 milliGRAM(s) Oral daily    MEDICATIONS  (PRN):  acetaminophen   Tablet .. 650 milliGRAM(s) Oral every 6 hours PRN Temp greater or equal to 38C (100.4F), Mild Pain (1 - 3)  dextrose 40% Gel 15 Gram(s) Oral once PRN Blood Glucose LESS THAN 70 milliGRAM(s)/deciLiter  fentaNYL    Injectable 25 MICROGram(s) IV Push every 2 hours PRN agitaion  glucagon  Injectable 1 milliGRAM(s) IntraMuscular once PRN Glucose <70 milliGRAM(s)/deciLiter  oxyCODONE    IR 5 milliGRAM(s) Oral every 4 hours PRN Moderate Pain (4 - 6)  oxyCODONE    IR 10 milliGRAM(s) Oral every 4 hours PRN Severe Pain (7 - 10)      Allergies  No Known Allergies      Review of Systems:  unable to obtain    Vital Signs Last 24 Hrs  T(C): 37.6 (2019 11:00), Max: 38.3 (2019 04:00)  T(F): 99.7 (2019 11:00), Max: 100.9 (2019 04:00)  HR: 91 (2019 11:00) (53 - 98)  BP: --  BP(mean): --  RR: 19 (2019 11:00) (16 - 23)  SpO2: 100% (2019 11:00) (97% - 100%)    PHYSICAL EXAM:  Constitutional: orally intubated, non responsive +NGT  Neck: supple  Respiratory: grossly clear anteriorly  Cardiovascular: S1 and S2, no murmur  Gastrointestinal: BS+, soft, NT/ND, neg HSM,  Extremities: No peripheral edema, neg clubbing, cyanosis  Vascular: 2+ peripheral pulses  Neurological: sedated, unresponsive  Skin: No rashes    LABS:                        11.4   8.5   )-----------( 268      ( 2019 01:30 )             33.3     02-25    147<H>  |  113<H>  |  14  ----------------------------<  136<H>  3.9   |  23  |  0.59    Ca    9.0      2019 07:21  Phos  3.2     02-25  Mg     2.4     02-25        Urinalysis Basic - ( 2019 06:18 )    Color: Light Yellow / Appearance: Clear / S.023 / pH: x  Gluc: x / Ketone: Negative  / Bili: Negative / Urobili: Negative   Blood: x / Protein: 30 mg/dL / Nitrite: Negative   Leuk Esterase: Negative / RBC: 5 /hpf / WBC 1 /HPF   Sq Epi: x / Non Sq Epi: 1 /hpf / Bacteria: Negative        RADIOLOGY & ADDITIONAL TESTS:

## 2019-02-25 NOTE — PROGRESS NOTE ADULT - ASSESSMENT
48 year old male with a past medical history of ETOH abuse (drinks 5 beers/day) who presented to [facility] after an episode of sudden onset b/l leg and arm weakness. CT head performed at [facility] revealed an acute left basal ganglia ICH with cerebral edema, brain compression and mass effect, CTA no vascular malformation, likely HTN etiology  ETOH withdrawal  Respiratory Failure  Dysphagia    Recs:  palliative care as per family wishes, no plans for trach or PEG  supportive care  Will sign off care, can recall prn    Discussed with NSCU team    Jordan Escobedo PA-C    [facility]  [phone]

## 2019-02-25 NOTE — CONSULT NOTE ADULT - PROBLEM SELECTOR RECOMMENDATION 9
trach booked for 1:30pm with Dr. Trotter 2/26  pending consent and family decision
Basal Ganglia Hemorrhage likely in setting of HTN  Poor neuro exam

## 2019-02-25 NOTE — PROGRESS NOTE ADULT - SUBJECTIVE AND OBJECTIVE BOX
47 yo M with h/o EtOH, presented with Lt BG hemorrhage, went into [facility]l - intubated.    Bradycardic o/n, labetalol held  no improvement in exam despite all benzo/sedating meds d/c'ed    Vitals/labs/meds/imaging reviewed    Exam:    General: intubated.  Neurological:   not EO, no blink to threat, anisociria - R5 mm and L2 mm, both sluggish, not FC, LC L>R (trace), BLE trace WD  Pulmonary: Clear to Auscultation, No Rales, No Rhonchi, No Wheezes   Cardiovascular: S1, S2, Regular Rate and Rhythm   Gastrointestinal: Soft, Nontender, Nondistended   Extremities: No calf tenderness

## 2019-02-25 NOTE — PROGRESS NOTE ADULT - PROBLEM SELECTOR PLAN 2
CT neck w contrast  Plan for possible FNA of parotid gland in OR Family declining bx in OR at this time  Treat superimposed infection w clindamycin 600mg q 8 x 1 week

## 2019-02-25 NOTE — PROGRESS NOTE ADULT - SUBJECTIVE AND OBJECTIVE BOX
ENT ISSUE/POD: Respiratory failure, Parotid inflammaiton    HPI:       PAST MEDICAL & SURGICAL HISTORY:  ETOH abuse  Jaw fracture  Cyst of parotid gland: Left x 15 years  ICH (intracerebral hemorrhage)  Fracture, jaw: with surgical repair    Allergies    No Known Allergies    Intolerances      MEDICATIONS  (STANDING):  amLODIPine   Tablet 10 milliGRAM(s) Oral daily  chlorhexidine 0.12% Liquid 15 milliLiter(s) Oral Mucosa two times a day  chlorhexidine 4% Liquid 1 Application(s) Topical <User Schedule>  dextrose 5%. 1000 milliLiter(s) (50 mL/Hr) IV Continuous <Continuous>  dextrose 50% Injectable 12.5 Gram(s) IV Push once  dextrose 50% Injectable 25 Gram(s) IV Push once  dextrose 50% Injectable 25 Gram(s) IV Push once  enoxaparin Injectable 40 milliGRAM(s) SubCutaneous <User Schedule>  famotidine    Tablet 20 milliGRAM(s) Oral two times a day  folic acid 1 milliGRAM(s) Enteral Tube daily  hydrALAZINE 100 milliGRAM(s) Oral every 8 hours  insulin lispro (HumaLOG) corrective regimen sliding scale   SubCutaneous every 6 hours  insulin NPH human recombinant 18 Unit(s) SubCutaneous every 6 hours  lisinopril 40 milliGRAM(s) Oral daily  sodium chloride 2 Gram(s) Oral every 8 hours  sodium chloride 2% . 1000 milliLiter(s) (100 mL/Hr) IV Continuous <Continuous>  thiamine 100 milliGRAM(s) Oral daily    MEDICATIONS  (PRN):  acetaminophen   Tablet .. 650 milliGRAM(s) Oral every 6 hours PRN Temp greater or equal to 38C (100.4F), Mild Pain (1 - 3)  dextrose 40% Gel 15 Gram(s) Oral once PRN Blood Glucose LESS THAN 70 milliGRAM(s)/deciLiter  fentaNYL    Injectable 25 MICROGram(s) IV Push every 2 hours PRN agitaion  glucagon  Injectable 1 milliGRAM(s) IntraMuscular once PRN Glucose <70 milliGRAM(s)/deciLiter  oxyCODONE    IR 5 milliGRAM(s) Oral every 4 hours PRN Moderate Pain (4 - 6)  oxyCODONE    IR 10 milliGRAM(s) Oral every 4 hours PRN Severe Pain (7 - 10)        ROS:   ENT: all negative except as noted in HPI   Pulm: denies SOB, cough, hemoptysis  Neuro: denies numbness/tingling, loss of sensation  Endo: denies heat/cold intolerance, excessive sweating      Vital Signs Last 24 Hrs  T(C): 37 (25 Feb 2019 07:00), Max: 38.3 (25 Feb 2019 04:00)  T(F): 98.6 (25 Feb 2019 07:00), Max: 100.9 (25 Feb 2019 04:00)  HR: 86 (25 Feb 2019 08:32) (53 - 103)  BP: --  BP(mean): --  RR: 17 (25 Feb 2019 08:00) (16 - 23)  SpO2: 100% (25 Feb 2019 08:32) (97% - 100%)                          11.4   8.5   )-----------( 268      ( 25 Feb 2019 01:30 )             33.3    02-25    147<H>  |  113<H>  |  14  ----------------------------<  136<H>  3.9   |  23  |  0.59    Ca    9.0      25 Feb 2019 07:21  Phos  3.2     02-25  Mg     2.4     02-25         PHYSICAL EXAM:  Gen: NAD  Skin: No rashes, bruises, or lesions  Head: Normocephalic, Atraumatic  Face: no edema, erythema, or fluctuance. Parotid glands soft without mass  Eyes: no scleral injection  Ears: Right - ear canal clear, TM intact without effusion or erythema. No evidence of any fluid drainage. No mastoid tenderness, erythema, or ear bulging            Left - ear canal clear, TM intact without effusion or erythema. No evidence of any fluid drainage. No mastoid tenderness, erythema, or ear bulging  Nose: Nares bilaterally patent, no discharge  Mouth: No Stridor / Drooling / Trismus.  Mucosa moist, tongue/uvula midline, oropharynx clear  Neck: Flat, supple, no lymphadenopathy, trachea midline, no masses  Lymphatic: No lymphadenopathy  Resp: breathing easily, no stridor  Neuro: facial nerve intact, no facial droop ENT ISSUE/POD: Respiratory failure, Parotid inflammaiton    HPI: 47 yo male w left basal ganglia hemorrhage planned for tracheostomy with L parotid swelling. Per family, pt with this swelling for a while now. WBC WNL, afebrile.      PAST MEDICAL & SURGICAL HISTORY:  ETOH abuse  Jaw fracture  Cyst of parotid gland: Left x 15 years  ICH (intracerebral hemorrhage)  Fracture, jaw: with surgical repair    Allergies    No Known Allergies    Intolerances      MEDICATIONS  (STANDING):  amLODIPine   Tablet 10 milliGRAM(s) Oral daily  chlorhexidine 0.12% Liquid 15 milliLiter(s) Oral Mucosa two times a day  chlorhexidine 4% Liquid 1 Application(s) Topical <User Schedule>  dextrose 5%. 1000 milliLiter(s) (50 mL/Hr) IV Continuous <Continuous>  dextrose 50% Injectable 12.5 Gram(s) IV Push once  dextrose 50% Injectable 25 Gram(s) IV Push once  dextrose 50% Injectable 25 Gram(s) IV Push once  enoxaparin Injectable 40 milliGRAM(s) SubCutaneous <User Schedule>  famotidine    Tablet 20 milliGRAM(s) Oral two times a day  folic acid 1 milliGRAM(s) Enteral Tube daily  hydrALAZINE 100 milliGRAM(s) Oral every 8 hours  insulin lispro (HumaLOG) corrective regimen sliding scale   SubCutaneous every 6 hours  insulin NPH human recombinant 18 Unit(s) SubCutaneous every 6 hours  lisinopril 40 milliGRAM(s) Oral daily  sodium chloride 2 Gram(s) Oral every 8 hours  sodium chloride 2% . 1000 milliLiter(s) (100 mL/Hr) IV Continuous <Continuous>  thiamine 100 milliGRAM(s) Oral daily    MEDICATIONS  (PRN):  acetaminophen   Tablet .. 650 milliGRAM(s) Oral every 6 hours PRN Temp greater or equal to 38C (100.4F), Mild Pain (1 - 3)  dextrose 40% Gel 15 Gram(s) Oral once PRN Blood Glucose LESS THAN 70 milliGRAM(s)/deciLiter  fentaNYL    Injectable 25 MICROGram(s) IV Push every 2 hours PRN agitaion  glucagon  Injectable 1 milliGRAM(s) IntraMuscular once PRN Glucose <70 milliGRAM(s)/deciLiter  oxyCODONE    IR 5 milliGRAM(s) Oral every 4 hours PRN Moderate Pain (4 - 6)  oxyCODONE    IR 10 milliGRAM(s) Oral every 4 hours PRN Severe Pain (7 - 10)        ROS:   unable to obtain due to pts condition      Vital Signs Last 24 Hrs  T(C): 37 (25 Feb 2019 07:00), Max: 38.3 (25 Feb 2019 04:00)  T(F): 98.6 (25 Feb 2019 07:00), Max: 100.9 (25 Feb 2019 04:00)  HR: 86 (25 Feb 2019 08:32) (53 - 103)  BP: --  BP(mean): --  RR: 17 (25 Feb 2019 08:00) (16 - 23)  SpO2: 100% (25 Feb 2019 08:32) (97% - 100%)                          11.4   8.5   )-----------( 268      ( 25 Feb 2019 01:30 )             33.3    02-25    147<H>  |  113<H>  |  14  ----------------------------<  136<H>  3.9   |  23  |  0.59    Ca    9.0      25 Feb 2019 07:21  Phos  3.2     02-25  Mg     2.4     02-25         PHYSICAL EXAM:  Gen: NAD  Skin: No rashes, bruises, or lesions  Head: Normocephalic, Atraumatic  Face: L parotid gland firm, with mass, non erythematous, no fluctuance  Eyes: no scleral injection  Nose: Nares bilaterally patent, no discharge  Mouth: No Stridor / Drooling / Trismus.  Mucosa moist, tongue/uvula midline, ETTube in place, merky drainage from stensons duct  Neck: Flat, supple, no lymphadenopathy, trachea midline, no masses  Lymphatic: No lymphadenopathy  Resp: breathing easily, no stridor  Neuro: facial nerve intact, no facial droop ENT ISSUE/POD: Respiratory failure, Parotid inflammaiton    HPI: 49 yo male w left basal ganglia hemorrhage planned for tracheostomy with L parotid swelling. Per family, pt with this swelling for a while now. WBC WNL, afebrile.      PAST MEDICAL & SURGICAL HISTORY:  ETOH abuse  Jaw fracture  Cyst of parotid gland: Left x 15 years  ICH (intracerebral hemorrhage)  Fracture, jaw: with surgical repair    Allergies    No Known Allergies    Intolerances      MEDICATIONS  (STANDING):  amLODIPine   Tablet 10 milliGRAM(s) Oral daily  chlorhexidine 0.12% Liquid 15 milliLiter(s) Oral Mucosa two times a day  chlorhexidine 4% Liquid 1 Application(s) Topical <User Schedule>  dextrose 5%. 1000 milliLiter(s) (50 mL/Hr) IV Continuous <Continuous>  dextrose 50% Injectable 12.5 Gram(s) IV Push once  dextrose 50% Injectable 25 Gram(s) IV Push once  dextrose 50% Injectable 25 Gram(s) IV Push once  enoxaparin Injectable 40 milliGRAM(s) SubCutaneous <User Schedule>  famotidine    Tablet 20 milliGRAM(s) Oral two times a day  folic acid 1 milliGRAM(s) Enteral Tube daily  hydrALAZINE 100 milliGRAM(s) Oral every 8 hours  insulin lispro (HumaLOG) corrective regimen sliding scale   SubCutaneous every 6 hours  insulin NPH human recombinant 18 Unit(s) SubCutaneous every 6 hours  lisinopril 40 milliGRAM(s) Oral daily  sodium chloride 2 Gram(s) Oral every 8 hours  sodium chloride 2% . 1000 milliLiter(s) (100 mL/Hr) IV Continuous <Continuous>  thiamine 100 milliGRAM(s) Oral daily    MEDICATIONS  (PRN):  acetaminophen   Tablet .. 650 milliGRAM(s) Oral every 6 hours PRN Temp greater or equal to 38C (100.4F), Mild Pain (1 - 3)  dextrose 40% Gel 15 Gram(s) Oral once PRN Blood Glucose LESS THAN 70 milliGRAM(s)/deciLiter  fentaNYL    Injectable 25 MICROGram(s) IV Push every 2 hours PRN agitaion  glucagon  Injectable 1 milliGRAM(s) IntraMuscular once PRN Glucose <70 milliGRAM(s)/deciLiter  oxyCODONE    IR 5 milliGRAM(s) Oral every 4 hours PRN Moderate Pain (4 - 6)  oxyCODONE    IR 10 milliGRAM(s) Oral every 4 hours PRN Severe Pain (7 - 10)        ROS:   unable to obtain due to pts condition      Vital Signs Last 24 Hrs  T(C): 37 (25 Feb 2019 07:00), Max: 38.3 (25 Feb 2019 04:00)  T(F): 98.6 (25 Feb 2019 07:00), Max: 100.9 (25 Feb 2019 04:00)  HR: 86 (25 Feb 2019 08:32) (53 - 103)  BP: --  BP(mean): --  RR: 17 (25 Feb 2019 08:00) (16 - 23)  SpO2: 100% (25 Feb 2019 08:32) (97% - 100%)                          11.4   8.5   )-----------( 268      ( 25 Feb 2019 01:30 )             33.3    02-25    147<H>  |  113<H>  |  14  ----------------------------<  136<H>  3.9   |  23  |  0.59    Ca    9.0      25 Feb 2019 07:21  Phos  3.2     02-25  Mg     2.4     02-25         PHYSICAL EXAM:  Gen: NAD  Skin: No rashes, bruises, or lesions  Head: Normocephalic, Atraumatic  Face: L parotid gland firm, with mass, non erythematous, no fluctuance  Eyes: no scleral injection  Nose: Nares bilaterally patent, no discharge  Mouth: No Stridor / Drooling / Trismus.  Mucosa moist, tongue/uvula midline, ETTube in place, murky drainage from stensons duct  Neck: Flat, supple, no lymphadenopathy, trachea midline, no masses  Lymphatic: No lymphadenopathy  Resp: breathing easily, no stridor  Neuro: facial nerve intact, no facial droop

## 2019-02-25 NOTE — PROGRESS NOTE ADULT - PROBLEM SELECTOR PLAN 1
Plan for trach 2/26 w Dr. Trotter at 1:30  Will need updated labs, T&S, CXR, EKG  NPO at midnight tonight Plan for trach 2/26 w Dr. Trotter at 1:30  Will need updated labs, T&S, CXR, EKG  NPO at midnight tonight  Pending consent as family is discussing GOC

## 2019-02-25 NOTE — AIRWAY REMOVAL NOTE  ADULT & PEDS - ARTIFICAL AIRWAY REMOVAL COMMENTS
Written order for extubation verified. Patient identified  by full name and date of birth as per identification band. Present at the procedure were ..MARYLIN Keyes.

## 2019-02-25 NOTE — CONSULT NOTE ADULT - REASON FOR ADMISSION
Left basal ganglia hemorrhage

## 2019-02-25 NOTE — PROGRESS NOTE ADULT - ASSESSMENT
A/P:  Left BG ICH, CTA no vascular malformation, most likley HTN etiology.  ICH score 1  EtOH withdrawal.  Acute hypoxic resp distress.    Neuro:   off sedation  EEG--negative  cont thiamine + supplements  MRI done showing stable heme, no other findings on 2/17    Respiratory: likely aspiration pneumonia; PRVC - CPAP, not waking up, will monitor  pressure support as tolerated, poor mental status, would hold extubation now, reassess  CV: -160 mmhg,on hydralazine and labetalol, d/c clonidine  Endocrine: maintain euglycemia, NPH+ROZ  Renal: IVL, salt tabs 2 g q 8 hrs , Na goal 145-150, BMP now   ID: cont zosyn for asp pna x7d completed  GI: on TF, diarrhea, protonix, abdominal xray , will send cx for C.diff --negative  Heme/Onc: DVT ppx:  SCD, heparin 5000 units q 12 hrs; RUE Doppler negative  Social/Family: patient is DNR as per the discussion with the family  Discharge planning: ICU    Pending C discussion with the family in view of poor neuro exam and overall prognosis    Code Status: [] Full Code [x] DNR [] DNI [] Goals of Care:   Disposition: [x] ICU [] Stroke Unit [] RCU []PCU []Floor [] Discharge Home   family updated at bedside    Patient at high risk for neurologic deterioration, ICH expanding, seizures    critical care time, excluding procedures: 45 minutes

## 2019-02-25 NOTE — CONSULT NOTE ADULT - ASSESSMENT
48 year old male with a past medical history of ETOH abuse (drinks 5 beers/day) who presented to [facility] after an episode of sudden onset b/l leg and arm weakness. CT head performed at [facility] revealed an acute left basal ganglia ICH with cerebral edema, brain compression and mass effect, CTA no vascular malformation, likely HTN etiology  ETOH withdrawal  Respiratory Failure  Dysphagia    Recs:  Continue NGTF for meds/ nutrition  Discussed with mother at bedside- indications, risks, benefits. alternatives for PEG. She is agreeable to proceed  Will arrange endoscopy time for next week, pending availability  NSCU supportive care    Discussed with NSCU team    Please call over weekend prn with GI concerns   GI service : [phone]    Thank you for the courtesy of this consult.    Jordan Escobedo PA-C    [facility]  [phone]
47 Y/O male hx of ETOH abuse transferred from [facility] with upper and lower extremity weakness,  CTH demonstrates  left basil ganglia hemorrhage with mass effect and edema, neuro exam remains poor,  called for goals of care.
48M w/ L BG hemorrhage not on blood thinners, awake, alert, aphasic, L side good strength, R side minimal movement  - repeat CTH and again in AM  - keppra 500mg BID  - Care per stroke neurology  - cont neurochecks
48y requiring trach, unable to be extubated.
Pt is a 48 year old male with a past medical history of ETOH abuse (drinks 12 beers/day) who is admitted for a left basal ganglia hemorrhage requiring intubation due to likely alcohol withdrawal complicated by PNA. Since admission hemorrhage has increased in size with some mild rightward shift, though repeat imagining has shown stabilization of hematoma size. Pt has been on sedation and benzo protocol for withdrawal, worsening his mental status. Conventional angiogram of the head is normal. Exam as noted, on sedation, though consistent with left sided hemispheric dysfunction.    Recommendations:  - Neuro Checks Q1hr  - MRI Head w/wo  - TTE w/ bubble study  - PT/OT  - LDL: 106  - HbA1c: 6.5  - Maintain MAP around 110  - DVT ppx

## 2019-02-25 NOTE — PROGRESS NOTE ADULT - ASSESSMENT
49 yo male w left basal ganglia hemorrhage planned for tracheostomy w known partoid swelling for 'a while' per family. Plan for biopsy in operating room offered, faily declined at this time. Possible superimposed infection 2/2 parotid mass as pt with merky drainage from stensons duct w palpation. WBC WNL, Afebrile most recently although has been spiking fevers. 47 yo male w left basal ganglia hemorrhage planned for tracheostomy w known partoid swelling for 'a while' per family. Plan for biopsy in operating room offered, faily declined at this time. Possible superimposed infection 2/2 parotid mass as pt with murky drainage from stensons duct w palpation. WBC WNL, Afebrile most recently although has been spiking fevers.

## 2019-02-25 NOTE — CONSULT NOTE ADULT - PROBLEM SELECTOR RECOMMENDATION 4
Spent 35 minutes with patients family specifically :  Yvonne Way : elected surrogate , MARCY Gallagher who deferred decisions to patients mother.  Discussed  patients current neurological status, likelihood for a life maintained through artificial means ,  trach, peg, long term care.   Discussed quality of life and what that would mean to this patient based on past conversations.  Mother and SO all agree that patient was a very active individual involved with  family and  friends, fun loving and generally a "peoples person".   Family all in agreement that patient would not want to pursue a life of dependency or a life dependent on life supporting measures.   All agree with transfer to PCU with elective extubation , date to be determined.  Comfort measures include but not limited to:  no iv fluid, no blood glucose testing, no antibiotics, no artificial nutrition, no diagnostics, no aggressive medical interventions, maintain MOSLT FORM directives. Spent 35 minutes with patients family specifically :  Yvonne Way : elected surrogate , MARCY Gallagher who deferred decisions to patients mother.  Discussed  patients current neurological status, likelihood for a life maintained through artificial means ,  trach, peg, long term care.   Discussed quality of life and what that would mean to this patient based on past conversations.  Mother and SO all agree that patient was a very active individual involved with  family and  friends, fun loving and generally a "peoples person".   Family all in agreement that patient would not want to pursue a life of dependency or a life dependent on life supporting measures.   All agree with transfer to PCU with elective extubation , date to be determined.  Comfort measures include but not limited to:  no iv fluid, no blood glucose testing, no antibiotics, no artificial nutrition, no diagnostics, no aggressive medical interventions, maintain MOSLT FORM directives.  Transfer to PCU when bed available

## 2019-02-26 VITALS
HEART RATE: 141 BPM | RESPIRATION RATE: 28 BRPM | SYSTOLIC BLOOD PRESSURE: 134 MMHG | TEMPERATURE: 102 F | OXYGEN SATURATION: 80 % | DIASTOLIC BLOOD PRESSURE: 81 MMHG

## 2019-02-26 DIAGNOSIS — R50.9 FEVER, UNSPECIFIED: ICD-10-CM

## 2019-02-26 LAB
GRAM STN FLD: SIGNIFICANT CHANGE UP
SPECIMEN SOURCE: SIGNIFICANT CHANGE UP

## 2019-02-26 PROCEDURE — 36224 PLACE CATH CAROTD ART: CPT

## 2019-02-26 PROCEDURE — 82248 BILIRUBIN DIRECT: CPT

## 2019-02-26 PROCEDURE — 70553 MRI BRAIN STEM W/O & W/DYE: CPT

## 2019-02-26 PROCEDURE — 84443 ASSAY THYROID STIM HORMONE: CPT

## 2019-02-26 PROCEDURE — 85730 THROMBOPLASTIN TIME PARTIAL: CPT

## 2019-02-26 PROCEDURE — 84132 ASSAY OF SERUM POTASSIUM: CPT

## 2019-02-26 PROCEDURE — 86900 BLOOD TYPING SEROLOGIC ABO: CPT

## 2019-02-26 PROCEDURE — 87449 NOS EACH ORGANISM AG IA: CPT

## 2019-02-26 PROCEDURE — 36227 PLACE CATH XTRNL CAROTID: CPT

## 2019-02-26 PROCEDURE — 85027 COMPLETE CBC AUTOMATED: CPT

## 2019-02-26 PROCEDURE — 80061 LIPID PANEL: CPT

## 2019-02-26 PROCEDURE — 85014 HEMATOCRIT: CPT

## 2019-02-26 PROCEDURE — 85610 PROTHROMBIN TIME: CPT

## 2019-02-26 PROCEDURE — 82330 ASSAY OF CALCIUM: CPT

## 2019-02-26 PROCEDURE — 93005 ELECTROCARDIOGRAM TRACING: CPT

## 2019-02-26 PROCEDURE — 84295 ASSAY OF SERUM SODIUM: CPT

## 2019-02-26 PROCEDURE — 99233 SBSQ HOSP IP/OBS HIGH 50: CPT | Mod: GC

## 2019-02-26 PROCEDURE — 80202 ASSAY OF VANCOMYCIN: CPT

## 2019-02-26 PROCEDURE — 83930 ASSAY OF BLOOD OSMOLALITY: CPT

## 2019-02-26 PROCEDURE — 84100 ASSAY OF PHOSPHORUS: CPT

## 2019-02-26 PROCEDURE — 83735 ASSAY OF MAGNESIUM: CPT

## 2019-02-26 PROCEDURE — A9585: CPT

## 2019-02-26 PROCEDURE — 82565 ASSAY OF CREATININE: CPT

## 2019-02-26 PROCEDURE — 36223 PLACE CATH CAROTID/INOM ART: CPT | Mod: 59

## 2019-02-26 PROCEDURE — 87150 DNA/RNA AMPLIFIED PROBE: CPT

## 2019-02-26 PROCEDURE — 84436 ASSAY OF TOTAL THYROXINE: CPT

## 2019-02-26 PROCEDURE — 87324 CLOSTRIDIUM AG IA: CPT

## 2019-02-26 PROCEDURE — 94002 VENT MGMT INPAT INIT DAY: CPT

## 2019-02-26 PROCEDURE — 80048 BASIC METABOLIC PNL TOTAL CA: CPT

## 2019-02-26 PROCEDURE — 87186 SC STD MICRODIL/AGAR DIL: CPT

## 2019-02-26 PROCEDURE — 84480 ASSAY TRIIODOTHYRONINE (T3): CPT

## 2019-02-26 PROCEDURE — 94003 VENT MGMT INPAT SUBQ DAY: CPT

## 2019-02-26 PROCEDURE — C1769: CPT

## 2019-02-26 PROCEDURE — C1887: CPT

## 2019-02-26 PROCEDURE — 83605 ASSAY OF LACTIC ACID: CPT

## 2019-02-26 PROCEDURE — 82947 ASSAY GLUCOSE BLOOD QUANT: CPT

## 2019-02-26 PROCEDURE — 82435 ASSAY OF BLOOD CHLORIDE: CPT

## 2019-02-26 PROCEDURE — 86850 RBC ANTIBODY SCREEN: CPT

## 2019-02-26 PROCEDURE — 93306 TTE W/DOPPLER COMPLETE: CPT

## 2019-02-26 PROCEDURE — 82962 GLUCOSE BLOOD TEST: CPT

## 2019-02-26 PROCEDURE — 70450 CT HEAD/BRAIN W/O DYE: CPT

## 2019-02-26 PROCEDURE — 36226 PLACE CATH VERTEBRAL ART: CPT

## 2019-02-26 PROCEDURE — 95951: CPT

## 2019-02-26 PROCEDURE — 86901 BLOOD TYPING SEROLOGIC RH(D): CPT

## 2019-02-26 PROCEDURE — 87070 CULTURE OTHR SPECIMN AEROBIC: CPT

## 2019-02-26 PROCEDURE — 81003 URINALYSIS AUTO W/O SCOPE: CPT

## 2019-02-26 PROCEDURE — 71045 X-RAY EXAM CHEST 1 VIEW: CPT

## 2019-02-26 PROCEDURE — 83036 HEMOGLOBIN GLYCOSYLATED A1C: CPT

## 2019-02-26 PROCEDURE — 93971 EXTREMITY STUDY: CPT

## 2019-02-26 PROCEDURE — C1894: CPT

## 2019-02-26 PROCEDURE — 82803 BLOOD GASES ANY COMBINATION: CPT

## 2019-02-26 PROCEDURE — 87040 BLOOD CULTURE FOR BACTERIA: CPT

## 2019-02-26 PROCEDURE — 80053 COMPREHEN METABOLIC PANEL: CPT

## 2019-02-26 PROCEDURE — 95819 EEG AWAKE AND ASLEEP: CPT

## 2019-02-26 PROCEDURE — 74018 RADEX ABDOMEN 1 VIEW: CPT

## 2019-02-26 PROCEDURE — 81001 URINALYSIS AUTO W/SCOPE: CPT

## 2019-02-26 PROCEDURE — 94799 UNLISTED PULMONARY SVC/PX: CPT

## 2019-02-26 PROCEDURE — 82140 ASSAY OF AMMONIA: CPT

## 2019-02-26 RX ORDER — HYDROMORPHONE HYDROCHLORIDE 2 MG/ML
0.5 INJECTION INTRAMUSCULAR; INTRAVENOUS; SUBCUTANEOUS
Qty: 100 | Refills: 0 | Status: DISCONTINUED | OUTPATIENT
Start: 2019-02-26 | End: 2019-02-26

## 2019-02-26 RX ORDER — HYDROMORPHONE HYDROCHLORIDE 2 MG/ML
1 INJECTION INTRAMUSCULAR; INTRAVENOUS; SUBCUTANEOUS
Qty: 0 | Refills: 0 | Status: DISCONTINUED | OUTPATIENT
Start: 2019-02-26 | End: 2019-02-26

## 2019-02-26 RX ORDER — HYDROMORPHONE HYDROCHLORIDE 2 MG/ML
0.2 INJECTION INTRAMUSCULAR; INTRAVENOUS; SUBCUTANEOUS
Qty: 100 | Refills: 0 | Status: DISCONTINUED | OUTPATIENT
Start: 2019-02-26 | End: 2019-02-26

## 2019-02-26 RX ORDER — DEXAMETHASONE 0.5 MG/5ML
4 ELIXIR ORAL ONCE
Qty: 0 | Refills: 0 | Status: COMPLETED | OUTPATIENT
Start: 2019-02-26 | End: 2019-02-26

## 2019-02-26 RX ORDER — KETOROLAC TROMETHAMINE 30 MG/ML
15 SYRINGE (ML) INJECTION ONCE
Qty: 0 | Refills: 0 | Status: DISCONTINUED | OUTPATIENT
Start: 2019-02-26 | End: 2019-02-26

## 2019-02-26 RX ADMIN — Medication 1 MILLIGRAM(S): at 13:24

## 2019-02-26 RX ADMIN — SODIUM CHLORIDE 10 MILLILITER(S): 9 INJECTION INTRAMUSCULAR; INTRAVENOUS; SUBCUTANEOUS at 08:33

## 2019-02-26 RX ADMIN — HYDROMORPHONE HYDROCHLORIDE 0.2 MG/HR: 2 INJECTION INTRAMUSCULAR; INTRAVENOUS; SUBCUTANEOUS at 08:33

## 2019-02-26 RX ADMIN — HYDROMORPHONE HYDROCHLORIDE 0.5 MILLIGRAM(S): 2 INJECTION INTRAMUSCULAR; INTRAVENOUS; SUBCUTANEOUS at 01:11

## 2019-02-26 RX ADMIN — HYDROMORPHONE HYDROCHLORIDE 0.5 MILLIGRAM(S): 2 INJECTION INTRAMUSCULAR; INTRAVENOUS; SUBCUTANEOUS at 16:00

## 2019-02-26 RX ADMIN — Medication 1 MILLIGRAM(S): at 03:06

## 2019-02-26 RX ADMIN — HYDROMORPHONE HYDROCHLORIDE 0.5 MILLIGRAM(S): 2 INJECTION INTRAMUSCULAR; INTRAVENOUS; SUBCUTANEOUS at 08:44

## 2019-02-26 RX ADMIN — ROBINUL 0.4 MILLIGRAM(S): 0.2 INJECTION INTRAMUSCULAR; INTRAVENOUS at 05:08

## 2019-02-26 RX ADMIN — HYDROMORPHONE HYDROCHLORIDE 0.5 MILLIGRAM(S): 2 INJECTION INTRAMUSCULAR; INTRAVENOUS; SUBCUTANEOUS at 00:07

## 2019-02-26 RX ADMIN — Medication 650 MILLIGRAM(S): at 13:00

## 2019-02-26 RX ADMIN — ROBINUL 0.4 MILLIGRAM(S): 0.2 INJECTION INTRAMUSCULAR; INTRAVENOUS at 00:07

## 2019-02-26 RX ADMIN — HYDROMORPHONE HYDROCHLORIDE 0.5 MILLIGRAM(S): 2 INJECTION INTRAMUSCULAR; INTRAVENOUS; SUBCUTANEOUS at 14:27

## 2019-02-26 RX ADMIN — Medication 650 MILLIGRAM(S): at 05:09

## 2019-02-26 RX ADMIN — Medication 1 MILLIGRAM(S): at 05:50

## 2019-02-26 RX ADMIN — Medication 1 MILLIGRAM(S): at 00:16

## 2019-02-26 RX ADMIN — Medication 4 MILLIGRAM(S): at 13:16

## 2019-02-26 RX ADMIN — HYDROMORPHONE HYDROCHLORIDE 0.2 MG/HR: 2 INJECTION INTRAMUSCULAR; INTRAVENOUS; SUBCUTANEOUS at 06:35

## 2019-02-26 RX ADMIN — Medication 650 MILLIGRAM(S): at 12:00

## 2019-02-26 RX ADMIN — ROBINUL 0.4 MILLIGRAM(S): 0.2 INJECTION INTRAMUSCULAR; INTRAVENOUS at 11:59

## 2019-02-26 RX ADMIN — HYDROMORPHONE HYDROCHLORIDE 0.5 MILLIGRAM(S): 2 INJECTION INTRAMUSCULAR; INTRAVENOUS; SUBCUTANEOUS at 05:09

## 2019-02-26 RX ADMIN — Medication 1 MILLIGRAM(S): at 10:49

## 2019-02-26 NOTE — PROGRESS NOTE ADULT - PROVIDER SPECIALTY LIST ADULT
Anesthesia
NSICU
Neurosurgery
Palliative Care
ENT
NSICU
Gastroenterology

## 2019-02-26 NOTE — PROGRESS NOTE ADULT - SUBJECTIVE AND OBJECTIVE BOX
GAP TEAM PALLIATIVE CARE UNIT PROGRESS NOTE:      [  ] Patient on hospice program.    INDICATION FOR PALLIATIVE CARE UNIT SERVICES: Palliative extubation, acute respiratory failure secondary to basal ganglia hemorrhage     INTERVAL HPI/OVERNIGHT EVENTS: Patient required 5x dilaudid .5 mg PRNs overnight, subsequently started on dilaudid drip .2 mg/hr. Required 1x dialudid .5mg PRN since starting drip. Patient required 4x 1 mg lorazepam PRNs, subsequently started on lorazepam 1 mg q4hrs around the clock. Patient required 2x tylenol suppositories for fever (Tmax 38.9). Patient extubated .  This morning, patient unresponsive, with noisy, agonal breathing but otherwise appears comfortable.     DNR on chart: Yes  Yes    Allergies    No Known Allergies    Intolerances    MEDICATIONS  (STANDING):  glycopyrrolate Injectable 0.4 milliGRAM(s) IV Push every 6 hours  HYDROmorphone  Injectable 0.5 milliGRAM(s) IV Push once  HYDROmorphone Infusion 0.2 mG/Hr (0.2 mL/Hr) IV Continuous <Continuous>  LORazepam   Injectable 1 milliGRAM(s) IV Push every 4 hours  LORazepam   Injectable 1 milliGRAM(s) IV Push once  sodium chloride 0.9%. 1000 milliLiter(s) (10 mL/Hr) IV Continuous <Continuous>    MEDICATIONS  (PRN):  acetaminophen  Suppository .. 650 milliGRAM(s) Rectal every 6 hours PRN Temp greater or equal to 38C (100.4F)  HYDROmorphone  Injectable 0.5 milliGRAM(s) IV Push every 1 hour PRN dyspnea  LORazepam   Injectable 1 milliGRAM(s) IV Push every 1 hour PRN Agitation    ITEMS UNCHECKED ARE NOT PRESENT    PRESENT SYMPTOMS: [x]Unable to obtain due to poor mentation     Source if other than patient:  [ ]Family   [ ]Team     Pain (Impact on QOL):    Location:       Minimal acceptable level (0-10 scale):  Aggravating factors:  Quality:  Radiation:  Severity (0-10 scale):     Dyspnea:                           [ ]Mild [x ]Moderate []Severe  Anxiety:                             [ ]Mild [ ]Moderate [ ]Severe  Fatigue:                             [ ]Mild [ ]Moderate [ ]Severe  Nausea:                             [ ]Mild [ ]Moderate [ ]Severe  Loss of appetite:              [ ]Mild [ ]Moderate [ ]Severe  Constipation:                    [ ]Mild [ ]Moderate [ ]Severe    PAINAD Score: 2     [email or web address]/[email or web address] (Ctrl +  left click to view)  		  Other Symptoms:  [ ]All other review of systems negative     Karnofsky Performance Score/Palliative Performance Status Version 2:       10  %        http://palliative.info/resource_material/[email or web address].pdf    PHYSICAL EXAM:    Vital Signs Last 24 Hrs  T(C): 38.9 (2019 08:43), Max: 38.9 (2019 08:43)  T(F): 102 (2019 08:43), Max: 102 (2019 08:43)  HR: 141 (2019 08:43) (80 - 141)  BP: 134/81 (2019 08:43) (134/81 - 134/81)  BP(mean): --  RR: 28 (2019 08:43) (19 - 28)  SpO2: 80% (2019 08:43) (80% - 100%) I&O's Summary    2019 07:01  -  2019 07:00  --------------------------------------------------------  IN: 1155 mL / OUT: 6675 mL / NET: -5520 mL    GENERAL:  [ ]Alert  [ ]Oriented x   [ ]Lethargic  [ ]Cachexia  [x]Unarousable  [ ]Verbal  [x]Non-Verbal    Behavioral:   [ ] Anxiety  [ ] Delirium [ ] Agitation [ ] Other    HEENT:  [ ]Normal   [ ]Dry mouth   [ ]ET Tube/Trach  [ ]Oral lesions    PULMONARY:   [ ]Clear [x]Tachypnea  [x]Audible excessive secretions   [ ]Rhonchi        [ ]Right [ ]Left [ ]Bilateral  [ ]Crackles        [ ]Right [ ]Left [ ]Bilateral  [ ]Wheezing     [ ]Right [ ]Left [ ]Bilateral    CARDIOVASCULAR:    [ ]Regular [ ]Irregular [x]Tachy  [ ]Jj [ ]Murmur [ ]Other    GASTROINTESTINAL:  [x]Soft  [ ]Distended   [ ]+BS  [ ]Non tender [ ]Tender  [ ]PEG [ ]OGT/ NGT   Last BM: 19 @ 07:01  -  19 @ 07:00  --------------------------------------------------------  OUT: 400 mL    19 @ 07:01  -  19 @ 07:00  --------------------------------------------------------  OUT: 0 mL    19 @ 07:01  -  19 @ 07:00  --------------------------------------------------------  OUT: 25 mL    19 @ 07:01  -  19 @ 07:00  --------------------------------------------------------  OUT: 25 mL    GENITOURINARY:  [ ]Normal [ ] Incontinent   [ ]Oliguria/Anuria   [x][place]    MUSCULOSKELETAL:   [ ]Normal   [ ]Weakness  [x]Bed/Wheelchair bound [ ]Edema    NEUROLOGIC:   [ ]No focal deficits  [x] Cognitive impairment  [ ] Dysphagia [ ]Dysarthria [ ] Paresis [ ]Other     SKIN:   [x]Normal   [ ]Pressure ulcer(s)  [ ]Rash    CRITICAL CARE:  [ ] Shock Present  [ ]Septic [ ]Cardiogenic [x]Neurologic [ ]Hypovolemic  [ ]  Vasopressors [ ]  Inotropes   [x] Respiratory failure present  [x] Acute  [ ] Chronic [x] Hypoxic  [ ] Hypercarbic [ ] Other  [ ] Other organ failure     LABS:                        11.4   8.5   )-----------( 268      ( 2019 01:30 )             33.3   02-25    147<H>  |  113<H>  |  14  ----------------------------<  136<H>  3.9   |  23  |  0.59    Ca    9.0      2019 07:21  Phos  3.2       Mg     2.4             Urinalysis Basic - ( 2019 06:18 )    Color: Light Yellow / Appearance: Clear / S.023 / pH: x  Gluc: x / Ketone: Negative  / Bili: Negative / Urobili: Negative   Blood: x / Protein: 30 mg/dL / Nitrite: Negative   Leuk Esterase: Negative / RBC: 5 /hpf / WBC 1 /HPF   Sq Epi: x / Non Sq Epi: 1 /hpf / Bacteria: Negative      RADIOLOGY & ADDITIONAL STUDIES:  No new imaging     PROTEIN CALORIE MALNUTRITION: [ ] yes   [ ]no  [x] PPSV2 < or = 30% [ ] significant weight loss [x] poor nutritional intake [ ] anasarca [ ] catabolic state   Albumin, Serum: 3.1 g/dL (19 @ 08:44)   Artificial Nutrition [ ]     REFERRALS:   [ ]Chaplaincy  [ ] Hospice  [ ]Child Life  [x]Social Work  [ ]Case management [ ]Holistic Therapy [ ] Physical Therapy [ ] Dietary   Goals of Care Document:

## 2019-02-26 NOTE — PROGRESS NOTE ADULT - ATTENDING COMMENTS
upon further discussion about goals of care - family has decided to forgo tracheostomy.  Reconsult as needed.
I have personally seen and examined this patient and agree with the above assessment and plan, which I have reviewed and edited where appropriate.   Electively extubated yesterday.  Breathing pattern consistent with brain injury.  Family at bedside,  educated as to what to expect.  Questions answered.  Emotional support provided.

## 2019-02-26 NOTE — PROVIDER CONTACT NOTE (CRITICAL VALUE NOTIFICATION) - SITUATION
blood cultures drawn 02/16/2019  preliminary growth in aerobic bottle - gram positive cocci in clusters
positive blood culture from 2/25   preliminary       growth in aerobic bottle    gram positive cocci in clusters

## 2019-02-26 NOTE — PROVIDER CONTACT NOTE (CRITICAL VALUE NOTIFICATION) - BACKGROUND
49 yo s/p acute left basal ganglia ICH 47 yo s/p acute left basal ganglia ICH       electivelly extubated 2/25     now receiving comfort care only

## 2019-02-26 NOTE — PROVIDER CONTACT NOTE (CRITICAL VALUE NOTIFICATION) - ACTION/TREATMENT ORDERED:
Provider aware. Pt currently on antibiotics. No new orders placed. Will continue to monitor.
no action at this time

## 2019-02-26 NOTE — PROGRESS NOTE ADULT - PROBLEM SELECTOR PLAN 3
Basal ganglia hemorrhage likely in setting of HTN Basal ganglia hemorrhage likely in setting of HTN with no prognosis for functional recovery

## 2019-02-26 NOTE — PROGRESS NOTE ADULT - PROBLEM SELECTOR PLAN 5
-Patient admitted to PCU for comfort care  -Surrogate decision maker is patient's mother, Yvonne Way   -Comfort measures include but not limited to:  no iv fluid, no blood glucose testing, no antibiotics, no artificial nutrition, no diagnostics, no aggressive medical interventions, maintain MOSLT FORM directives.

## 2019-02-26 NOTE — PROGRESS NOTE ADULT - PROBLEM SELECTOR PLAN 1
secondary to acute ICH   -s/p palliative extubation 2/25   -Patient on dilaudid .2 mg/hr drip   -dialudid .5 mg PRN for worsening dyspnea  -Lorazepam 1 mg q4hrs   -Robinul 0.4 mg q6hrs for secretions

## 2019-02-26 NOTE — DISCHARGE NOTE FOR THE EXPIRED PATIENT - HOSPITAL COURSE
Patient is a 48 year old male with a past medical history of ETOH abuse (drinks 5 beers/day) who presented to [facility] after an episode of sudden onset b/l leg and arm weakness. Pt reports that he was helping a coworker replace a window when he had an acute onset of generalized b/l leg and arm weakness. He ambulated to the car with difficulty and had his coworker take him to [facility] ED. Pt reported when he tried to make a phone call his arms were so weak that he had difficulty keeping the phone at his ear. He also stated that when he arrived to the ED he had to be wheelchaired due to the weakness in his legs. He reports that at this time he feels like he is regaining strength in his legs and arms.     CT head performed at [facility] revealed an acute left basal ganglia ICH with cerebral edema, brain compression and mass effect. He was admitted to the ICU at [facility] where he had worsening mental status with worsening right sided hemiparesis. Repeat CT head was performed and revealed worsening basal ganglia hemorrhage, cerebral edema, and midline shift. He was placed on a Nicardipine infusion for strict blood pressure control and transferred to [facility] for further neurosurgical intervention and possible cerebral angiogram, intubated.  Despite efforts of the neurosurgery team, the patient was with a very poor prognosis and was brought to the palliative care unit for compassionate extubation.  He  on 2019.  Family at bedside.

## 2019-02-26 NOTE — PROGRESS NOTE ADULT - ASSESSMENT
49 y/o M with PMHx of ETOH abuse (5 beers/day) who presented to [facility] with b/l leg and arm weakness found to have acute left basal ganglia ICH with cerebral edema, brain compression, and mass effect. Admitted to [facility] ICU with progressively worsening mental status and repeat head CT showed worsening basal ganglia hemorrhage, cerebral edema, and midline shift. Patient transferred to [facility] NSCU for further neurosurgical evaluation. No neurosurgical intervention performed. Patient intubated in NSICU on 2/16.     Transferred to PCU 2/25 for palliative extubation and comfort care.

## 2019-02-26 NOTE — PROGRESS NOTE ADULT - REASON FOR ADMISSION
Left basal ganglia hemorrhage
cerebral hemorrhage
Left basal ganglia hemorrhage

## 2019-02-27 LAB
-  AMPICILLIN/SULBACTAM: SIGNIFICANT CHANGE UP
-  CEFAZOLIN: SIGNIFICANT CHANGE UP
-  CLINDAMYCIN: SIGNIFICANT CHANGE UP
-  ERYTHROMYCIN: SIGNIFICANT CHANGE UP
-  GENTAMICIN: SIGNIFICANT CHANGE UP
-  OXACILLIN: SIGNIFICANT CHANGE UP
-  PENICILLIN: SIGNIFICANT CHANGE UP
-  RIFAMPIN: SIGNIFICANT CHANGE UP
-  TETRACYCLINE: SIGNIFICANT CHANGE UP
-  TRIMETHOPRIM/SULFAMETHOXAZOLE: SIGNIFICANT CHANGE UP
-  VANCOMYCIN: SIGNIFICANT CHANGE UP
CULTURE RESULTS: SIGNIFICANT CHANGE UP
METHOD TYPE: SIGNIFICANT CHANGE UP
ORGANISM # SPEC MICROSCOPIC CNT: SIGNIFICANT CHANGE UP
ORGANISM # SPEC MICROSCOPIC CNT: SIGNIFICANT CHANGE UP
SPECIMEN SOURCE: SIGNIFICANT CHANGE UP

## 2019-02-28 PROBLEM — Z00.00 ENCOUNTER FOR PREVENTIVE HEALTH EXAMINATION: Noted: 2019-02-28

## 2019-03-02 LAB
CULTURE RESULTS: SIGNIFICANT CHANGE UP
SPECIMEN SOURCE: SIGNIFICANT CHANGE UP

## 2019-03-22 NOTE — DISCHARGE NOTE ADULT - CARE PLAN
240
Principal Discharge DX:	ICH (intracerebral hemorrhage)  Goal:	Transfer to [facility]  Assessment and plan of treatment:	Transfer to [facility] for further management  Secondary Diagnosis:	Hypertension, unspecified type

## 2022-04-27 NOTE — PATIENT PROFILE ADULT - PRIMARY SOURCE OF SUPPORT/COMFORT
friend/pet/extended family/child(oliver) Trilobed Flap Text: The defect edges were debeveled with a #15 scalpel blade.  Given the location of the defect and the proximity to free margins a trilobed flap was deemed most appropriate.  Using a sterile surgical marker, an appropriate trilobed flap drawn around the defect.    The area thus outlined was incised deep to adipose tissue with a #15 scalpel blade.  The skin margins were undermined to an appropriate distance in all directions utilizing iris scissors.

## 2023-08-24 NOTE — PATIENT PROFILE ADULT - EQUIPMENT CURRENTLY USED AT HOME
Caller: Nu Martino    Relationship to patient: Emergency Contact    Best call back number: [phone]     Date of positive COVID19 test: 8/24/23 AT HOME TEST-  TESTED POSITIVE FOR COVID AT HIS DOCTORS OFFICE     COVID19 symptoms: FATIGUE,COUGH,      Additional information or concerns: WANTING TO KNOW WHAT HER NEXT STEPS SHOULD BE     What is the patients preferred pharmacy: [id number] [place], [place] - [address]  AT [facility] [address] [facility] [phone] [facility] [phone]  [phone]        no

## 2025-03-13 NOTE — H&P ADULT - MS GEN HX ROS MEA POS PC
b/l arm and leg weakness/muscle weakness

## 2025-05-23 NOTE — H&P ADULT - PSYCHIATRIC
Noted, will await insurance response.    negative Affect and characteristics of appearance, verbalizations, behaviors are appropriate